# Patient Record
Sex: FEMALE | Race: BLACK OR AFRICAN AMERICAN | NOT HISPANIC OR LATINO | URBAN - METROPOLITAN AREA
[De-identification: names, ages, dates, MRNs, and addresses within clinical notes are randomized per-mention and may not be internally consistent; named-entity substitution may affect disease eponyms.]

---

## 2022-03-29 ENCOUNTER — INPATIENT (INPATIENT)
Facility: HOSPITAL | Age: 82
LOS: 0 days | Discharge: HOME | DRG: 312 | End: 2022-03-30
Attending: HOSPITALIST | Admitting: HOSPITALIST
Payer: MEDICARE

## 2022-03-29 VITALS
OXYGEN SATURATION: 99 % | DIASTOLIC BLOOD PRESSURE: 77 MMHG | SYSTOLIC BLOOD PRESSURE: 159 MMHG | TEMPERATURE: 98 F | RESPIRATION RATE: 16 BRPM | HEART RATE: 73 BPM

## 2022-03-29 DIAGNOSIS — R55 SYNCOPE AND COLLAPSE: ICD-10-CM

## 2022-03-29 DIAGNOSIS — Z90.710 ACQUIRED ABSENCE OF BOTH CERVIX AND UTERUS: Chronic | ICD-10-CM

## 2022-03-29 DIAGNOSIS — D36.9 BENIGN NEOPLASM, UNSPECIFIED SITE: Chronic | ICD-10-CM

## 2022-03-29 LAB
ALBUMIN SERPL ELPH-MCNC: 4.5 G/DL — SIGNIFICANT CHANGE UP (ref 3.3–5)
ALP SERPL-CCNC: 117 U/L — SIGNIFICANT CHANGE UP (ref 40–120)
ALT FLD-CCNC: 16 U/L — SIGNIFICANT CHANGE UP (ref 10–45)
ANION GAP SERPL CALC-SCNC: 13 MMOL/L — SIGNIFICANT CHANGE UP (ref 5–17)
APTT BLD: 27.6 SEC — SIGNIFICANT CHANGE UP (ref 27.5–35.5)
AST SERPL-CCNC: 18 U/L — SIGNIFICANT CHANGE UP (ref 10–40)
BASOPHILS # BLD AUTO: 0.02 K/UL — SIGNIFICANT CHANGE UP (ref 0–0.2)
BASOPHILS NFR BLD AUTO: 0.3 % — SIGNIFICANT CHANGE UP (ref 0–2)
BILIRUB SERPL-MCNC: 0.3 MG/DL — SIGNIFICANT CHANGE UP (ref 0.2–1.2)
BUN SERPL-MCNC: 20 MG/DL — SIGNIFICANT CHANGE UP (ref 7–23)
CALCIUM SERPL-MCNC: 9.9 MG/DL — SIGNIFICANT CHANGE UP (ref 8.4–10.5)
CHLORIDE SERPL-SCNC: 101 MMOL/L — SIGNIFICANT CHANGE UP (ref 96–108)
CO2 SERPL-SCNC: 26 MMOL/L — SIGNIFICANT CHANGE UP (ref 22–31)
CREAT SERPL-MCNC: 0.71 MG/DL — SIGNIFICANT CHANGE UP (ref 0.5–1.3)
EGFR: 85 ML/MIN/1.73M2 — SIGNIFICANT CHANGE UP
EOSINOPHIL # BLD AUTO: 0.11 K/UL — SIGNIFICANT CHANGE UP (ref 0–0.5)
EOSINOPHIL NFR BLD AUTO: 1.6 % — SIGNIFICANT CHANGE UP (ref 0–6)
GLUCOSE SERPL-MCNC: 147 MG/DL — HIGH (ref 70–99)
HCT VFR BLD CALC: 42.4 % — SIGNIFICANT CHANGE UP (ref 34.5–45)
HGB BLD-MCNC: 13.3 G/DL — SIGNIFICANT CHANGE UP (ref 11.5–15.5)
IMM GRANULOCYTES NFR BLD AUTO: 0.3 % — SIGNIFICANT CHANGE UP (ref 0–1.5)
INR BLD: 1.11 RATIO — SIGNIFICANT CHANGE UP (ref 0.88–1.16)
LYMPHOCYTES # BLD AUTO: 1.22 K/UL — SIGNIFICANT CHANGE UP (ref 1–3.3)
LYMPHOCYTES # BLD AUTO: 18.1 % — SIGNIFICANT CHANGE UP (ref 13–44)
MCHC RBC-ENTMCNC: 26.1 PG — LOW (ref 27–34)
MCHC RBC-ENTMCNC: 31.4 GM/DL — LOW (ref 32–36)
MCV RBC AUTO: 83.1 FL — SIGNIFICANT CHANGE UP (ref 80–100)
MONOCYTES # BLD AUTO: 0.5 K/UL — SIGNIFICANT CHANGE UP (ref 0–0.9)
MONOCYTES NFR BLD AUTO: 7.4 % — SIGNIFICANT CHANGE UP (ref 2–14)
NEUTROPHILS # BLD AUTO: 4.86 K/UL — SIGNIFICANT CHANGE UP (ref 1.8–7.4)
NEUTROPHILS NFR BLD AUTO: 72.3 % — SIGNIFICANT CHANGE UP (ref 43–77)
NRBC # BLD: 0 /100 WBCS — SIGNIFICANT CHANGE UP (ref 0–0)
PLATELET # BLD AUTO: 204 K/UL — SIGNIFICANT CHANGE UP (ref 150–400)
POTASSIUM SERPL-MCNC: 4 MMOL/L — SIGNIFICANT CHANGE UP (ref 3.5–5.3)
POTASSIUM SERPL-SCNC: 4 MMOL/L — SIGNIFICANT CHANGE UP (ref 3.5–5.3)
PROT SERPL-MCNC: 8 G/DL — SIGNIFICANT CHANGE UP (ref 6–8.3)
PROTHROM AB SERPL-ACNC: 12.9 SEC — SIGNIFICANT CHANGE UP (ref 10.5–13.4)
RBC # BLD: 5.1 M/UL — SIGNIFICANT CHANGE UP (ref 3.8–5.2)
RBC # FLD: 14.6 % — HIGH (ref 10.3–14.5)
SODIUM SERPL-SCNC: 140 MMOL/L — SIGNIFICANT CHANGE UP (ref 135–145)
TROPONIN T, HIGH SENSITIVITY RESULT: 36 NG/L — SIGNIFICANT CHANGE UP (ref 0–51)
WBC # BLD: 6.73 K/UL — SIGNIFICANT CHANGE UP (ref 3.8–10.5)
WBC # FLD AUTO: 6.73 K/UL — SIGNIFICANT CHANGE UP (ref 3.8–10.5)

## 2022-03-29 PROCEDURE — 93010 ELECTROCARDIOGRAM REPORT: CPT

## 2022-03-29 PROCEDURE — 99285 EMERGENCY DEPT VISIT HI MDM: CPT

## 2022-03-29 PROCEDURE — 70496 CT ANGIOGRAPHY HEAD: CPT | Mod: 26,MA

## 2022-03-29 PROCEDURE — 70498 CT ANGIOGRAPHY NECK: CPT | Mod: 26,MA

## 2022-03-29 NOTE — CONSULT NOTE ADULT - SUBJECTIVE AND OBJECTIVE BOX
HPI:  82 y.o. F with PMH of HTN and HLD presented to the ED after an episode of unresponsiveness. LKN 20:45 hr. Was at [employer] to purchase clothing for 's . Then acutely pt slumped over, unclear whether closed eyes fully, and became unresponsive. Daughter was next to pt and prevented her from falling. No generalized shaking or urinary incontinence noted. No injury to the head. After 15 seconds, pt regained consciousness and after having a candy, felt better. At the time of the exam, pt was A&O x3 and back to baseline without focal deficits and code was cancelled. Takes ASA 81 mg, amlodipine 10 mg, simvastatin 10 mg qhs. Lives at home alone now due to  passing away 2 days ago. No toxic habits. No prior hx of seizure or stroke. Possible that oldest sister had stroke with R sided weakness. Never seen neurologist    (Stroke only)  NIHSS: 0   MRS: 0    REVIEW OF SYSTEMS    A 10-system ROS was performed and is negative except for those items noted above and/or in the HPI.    PAST MEDICAL & SURGICAL HISTORY:  HLD (hyperlipidemia)    HTN (hypertension)    No significant past surgical history      FAMILY HISTORY:    SOCIAL HISTORY:   T/E/D:   Occupation:   Lives with:     MEDICATIONS (HOME):  Home Medications:    MEDICATIONS  (STANDING):    MEDICATIONS  (PRN):    ALLERGIES/INTOLERANCES:  Allergies  No Known Allergies    Intolerances    VITALS & EXAMINATION:  Vital Signs Last 24 Hrs  T(C): 36.8 (29 Mar 2022 21:44), Max: 36.8 (29 Mar 2022 21:44)  T(F): 98.2 (29 Mar 2022 21:44), Max: 98.2 (29 Mar 2022 21:44)  HR: 73 (29 Mar 2022 21:44) (73 - 73)  BP: 159/77 (29 Mar 2022 21:44) (159/77 - 159/77)  BP(mean): --  RR: 16 (29 Mar 2022 21:44) (16 - 16)  SpO2: 99% (29 Mar 2022 21:44) (99% - 99%)    General:  Constitutional: Obese Female, appears stated age, in no apparent distress including pain  Head: Normocephalic & atraumatic.  ENT: Patent ear canals, intact TM, mucus membranes moist & pink, neck supple, no lymphadenopathy.   Respiratory: Patent airway. All lung fields are clear to auscultation bilaterally.  Extremities: No cyanosis, clubbing, or edema.  Skin: No rashes, bruising, or discoloration.    Cardiovascular (>2): RRR no murmurs. Carotid pulsations symmetric, no bruits. Normal capillary beds refill, 1-2 seconds or less.     Neurological (>12):  MS: Awake, alert, oriented to person, place, situation, time. Normal affect. Follows all commands.    Language: Speech is clear, fluent with good repetition & comprehension (able to name objects___)    CNs: PERRLA (R = 3mm, L = 3mm). VFF. EOMI no nystagmus, no diplopia. V1-3 intact to LT/pinprick, well developed masseter muscles b/l. No facial asymmetry b/l, full eye closure strength b/l. Hearing grossly normal (rubbing fingers) b/l. Symmetric palate elevation in midline. Gag reflex deferred. Head turning & shoulder shrug intact b/l. Tongue midline, normal movements, no atrophy.    Fundoscopic: pale w/ sharp discs margins No vascular changes.      Motor: Normal muscle bulk & tone. No noticeable tremor or seizure. No pronator drift.              Deltoid	Biceps	Triceps	Wrist	Finger ABd	   R	5	5	5	5	5		5 	  L	5	5	5	5	5		5    	H-Flex	H-Ext	H-ABd	H-ADd	K-Flex	K-Ext	D-Flex	P-Flex  R	5	5	5	5	5	5	5	5 	   L	5	5	5	5	5	5	5	5	     Sensation: Intact to LT/PP/Temp/Vibration/Position b/l throughout.     Cortical: Extinction on DSS (neglect): none    Reflexes:              Biceps(C5)       BR(C6)     Triceps(C7)               Patellar(L4)    Achilles(S1)    Plantar Resp  R	2	          2	             2		        2		    2		Down   L	2	          2	             2		        2		    2		Down     Coordination: intact rapid-alt movements. No dysmetria to FTN/HTS    Gait: Normal Romberg. No postural instability. Normal stance and tandem gait.     LABORATORY:  CBC                       13.3   6.73  )-----------( 204      ( 29 Mar 2022 22:12 )             42.4     Chem       LFTs   Coagulopathy PT/INR - ( 29 Mar 2022 22:12 )   PT: 12.9 sec;   INR: 1.11 ratio         PTT - ( 29 Mar 2022 22:12 )  PTT:27.6 sec  Lipid Panel   A1c   Cardiac enzymes     U/A   CSF  Immunological  Other    STUDIES & IMAGING:  Studies (EKG, EEG, EMG, etc):     Radiology (XR, CT, MR, U/S, TTE/ADRIANA): HPI:  82 y.o. F with PMH of HTN and HLD presented to the ED after an episode of unresponsiveness. LKN 20:45 hr. Was at [employer] to purchase clothing for 's . Then acutely pt slumped over, unclear whether closed eyes fully, and became unresponsive. Daughter was next to pt and prevented her from falling. No generalized shaking or urinary incontinence noted. No injury to the head. After 15 seconds, pt regained consciousness and after having a candy, felt better. At the time of the exam, pt was A&O x3 and back to baseline without focal deficits and code was cancelled. Takes ASA 81 mg, amlodipine 10 mg, simvastatin 10 mg qhs. Lives at home alone now due to  passing away 2 days ago. No toxic habits. No prior hx of seizure or stroke. Possible that oldest sister had stroke with R sided weakness. Never seen neurologist    (Stroke only)  NIHSS: 0   MRS: 0    REVIEW OF SYSTEMS    A 10-system ROS was performed and is negative except for those items noted above and/or in the HPI.    PAST MEDICAL & SURGICAL HISTORY:  HLD (hyperlipidemia)    HTN (hypertension)    No significant past surgical history      FAMILY HISTORY:    SOCIAL HISTORY:   T/E/D:   Occupation:   Lives with:     MEDICATIONS (HOME):  Home Medications:    MEDICATIONS  (STANDING):    MEDICATIONS  (PRN):    ALLERGIES/INTOLERANCES:  Allergies  No Known Allergies    Intolerances    VITALS & EXAMINATION:  Vital Signs Last 24 Hrs  T(C): 36.8 (29 Mar 2022 21:44), Max: 36.8 (29 Mar 2022 21:44)  T(F): 98.2 (29 Mar 2022 21:44), Max: 98.2 (29 Mar 2022 21:44)  HR: 73 (29 Mar 2022 21:44) (73 - 73)  BP: 159/77 (29 Mar 2022 21:44) (159/77 - 159/77)  BP(mean): --  RR: 16 (29 Mar 2022 21:44) (16 - 16)  SpO2: 99% (29 Mar 2022 21:44) (99% - 99%)    General:  Constitutional: Obese Female, appears stated age, in no apparent distress including pain  Head: Normocephalic & atraumatic.  ENT: Patent ear canals, intact TM, mucus membranes moist & pink, neck supple, no lymphadenopathy.   Respiratory: Patent airway. All lung fields are clear to auscultation bilaterally.  Extremities: No cyanosis, clubbing, or edema.  Skin: No rashes, bruising, or discoloration.    Cardiovascular (>2): RRR no murmurs. Carotid pulsations symmetric, no bruits. Normal capillary beds refill, 1-2 seconds or less.     Neurological (>12):  MS: Awake, alert, oriented to person, place, situation, time. Normal affect. Follows all commands.    Language: Speech is clear, fluent with good repetition & comprehension (able to name objects___)    CNs: PERRLA (R = 3mm, L = 3mm). VFF. EOMI no nystagmus, no diplopia. V1-3 intact to LT/pinprick, well developed masseter muscles b/l. No facial asymmetry b/l, full eye closure strength b/l. Hearing grossly normal (rubbing fingers) b/l. Symmetric palate elevation in midline. Gag reflex deferred. Head turning & shoulder shrug intact b/l. Tongue midline, normal movements, no atrophy.    Fundoscopic: pale w/ sharp discs margins No vascular changes.      Motor: Normal muscle bulk & tone. No noticeable tremor or seizure. No pronator drift.              Deltoid	Biceps	Triceps	Wrist	Finger ABd	   R	5	5	5	5	5		5 	  L	5	5	5	5	5		5    	H-Flex	H-Ext	H-ABd	H-ADd	K-Flex	K-Ext	D-Flex	P-Flex  R	5	5	5	5	5	5	5	5 	   L	5	5	5	5	5	5	5	5	     Sensation: Intact to LT/PP/Temp/Vibration/Position b/l throughout.     Cortical: Extinction on DSS (neglect): none    Reflexes:              Biceps(C5)       BR(C6)     Triceps(C7)               Patellar(L4)    Achilles(S1)    Plantar Resp  R	2	          2	             2		        2		    2		Down   L	2	          2	             2		        2		    2		Down     Coordination: intact rapid-alt movements. No dysmetria to FTN/HTS    Gait: Normal Romberg. No postural instability. Normal stance and tandem gait.     LABORATORY:  CBC                       13.3   6.73  )-----------( 204      ( 29 Mar 2022 22:12 )             42.4     Chem       LFTs   Coagulopathy PT/INR - ( 29 Mar 2022 22:12 )   PT: 12.9 sec;   INR: 1.11 ratio         PTT - ( 29 Mar 2022 22:12 )  PTT:27.6 sec  Lipid Panel   A1c   Cardiac enzymes     U/A   CSF  Immunological  Other    STUDIES & IMAGING:  Studies (EKG, EEG, EMG, etc):     Radiology (XR, CT, MR, U/S, TTE/ADRIANA):  c< from: CT Angio Neck w/ IV Cont (22 @ 22:19) >  IMPRESSION:    CT HEAD: No acute intracranial hemorrhage..    CTA BRAIN:  1. Patent intracranial circulation without flow limiting stenosis or   occlusion.    CTA NECK:  1. Prominent calcification at the origin of the right vertebral artery   obscures evaluation for patency. The remainder of the vertebral arteries   are patent.    --- End of Report ---    < end of copied text >

## 2022-03-29 NOTE — CONSULT NOTE ADULT - ATTENDING COMMENTS
82 y.o. F with PMH of HTN and HLD presented to the ED after an episode of unresponsiveness. LKN 20:45 hr. NIHSS 0 and mRS 0. Code stroke cancelled. Neuro exam non-focal. CTH neg. CTA showing prominent calcification at the origin of R vert, with remainder of vert patent. Labs grossly unremarkable.    Impression: Resolved 15 sec unresponsiveness, likely syncope    cardiac w/u  -No further inpatient Neurologic workup at this time  Can follow up with Neurology, Dr. Harman West at [phone]

## 2022-03-29 NOTE — ED PROVIDER NOTE - CROS ED CONS ALL NEG
My findings and recommendations are based on patient's symptoms, eye exam, diagnostic testing, and records. negative...

## 2022-03-29 NOTE — ED ADULT TRIAGE NOTE - CHIEF COMPLAINT QUOTE
patient was standing in line at a store after shopping for  husbands  clothes and started feeling as if she was going to pass out.  daughter states patient did not respond to verbalizations for about 15 seconds.  patient given a piece  of candy and symptoms resolved

## 2022-03-29 NOTE — CONSULT NOTE ADULT - ASSESSMENT
82 y.o. F with PMH of HTN and HLD presented to the ED after an episode of unresponsiveness. LKN 20:45 hr. NIHSS 0 and mRS 0. Code stroke cancelled. Neuro exam non-focal. CTH grossly negative to my eye and CTA with calcification at carotid bifurcations b/l, b/l PCA stenosis but with good distal circulation, awaiting final read.    Impression: Resolved 15 sec unresponsiveness of unclear etiology. Localization likely diffuse brain dysfunction. Ddx include syncope vs presyncope vs other cardiac vs lower suspicion for acute ischemic stroke.  Unlikely seizure.    Recommendations:  [] Basic CBC, CMP, UA, cardiac w/u including TTE  [] Upon discharge, PT can F/U with Dr. West if periods of unresponsiveness recurs and cardiac w/u neg. [phone] [address] [place] [address]     Case to be discussed with general neurology attending in AM 82 y.o. F with PMH of HTN and HLD presented to the ED after an episode of unresponsiveness. LKN 20:45 hr. NIHSS 0 and mRS 0. Code stroke cancelled. Neuro exam non-focal. CTH grossly negative to my eye and CTA with calcification at carotid bifurcations b/l, b/l PCA stenosis but with good distal circulation, awaiting final read.    Impression: Resolved 15 sec unresponsiveness of unclear etiology. Localization likely diffuse brain dysfunction. Ddx include syncope vs presyncope vs other cardiac vs lower suspicion for acute ischemic stroke.  Unlikely seizure.    Recommendations:  [] Basic CBC, CMP, UA, cardiac w/u including TTE  [] C/w home ASA 81 mg  [] Neurocheck and vital per unit protocol  [] Upon discharge, PT can F/U with Dr. West if periods of unresponsiveness recurs and cardiac w/u neg for outpt MRI brain w/o and routine EEG. [phone] [address] [place] [address]     Case to be discussed with general neurology attending in AM 82 y.o. F with PMH of HTN and HLD presented to the ED after an episode of unresponsiveness. LKN 20:45 hr. NIHSS 0 and mRS 0. Code stroke cancelled. Neuro exam non-focal. CTH neg. CTA showing prominent calcification at the origin of R vert, with remainder of vert patent. Labs grossly unremarkable.    Impression: Resolved 15 sec unresponsiveness of unclear etiology. Localization likely diffuse brain dysfunction. Ddx include syncope vs presyncope vs other cardiac vs lower suspicion for acute ischemic stroke.  Unlikely seizure.    Recommendations:  [] Basic CBC, CMP, UA, cardiac w/u including TTE  [] C/w home ASA 81 mg  [] Neurocheck and vital per unit protocol  [] Upon discharge, PT can F/U with Dr. West if periods of unresponsiveness recurs and cardiac w/u neg for outpt MRI brain w/o and routine EEG. [phone] [address] [place] [address]     Case to be discussed with general neurology attending in AM

## 2022-03-29 NOTE — ED PROVIDER NOTE - ATTENDING CONTRIBUTION TO CARE
attending Gris: 82yF h/o HLD, ?HTN on ASA 81mg daily presents after syncopal episode in today while shopping at [employer] 1.5 hours PTA (845pm) witnessed by pt's daughter. No trauma. Exam as above. Will obtain ekg, place on tele, labs including trop, CT head imaging (initially called as code stroke), anticipate admission

## 2022-03-29 NOTE — ED PROVIDER NOTE - OBJECTIVE STATEMENT
81 y/o F PMH HLD on baby aspirin daily, ? HTN presents to ED after near-syncope vs syncopal episode in today in [employer] 1.5 hours PTA (845pm) witnessed by pt's daughter. Pt reports she felt lightheaded and dizzy and her vision went black but remembers the episode. Daughter reports she slumped forward at checkout and was unresponsive to voice for 15-20s, then blinked slowly and came to saying "I'm fine." Denies fall or head injury, daughter reports she supported her through episode. pt was given hard candy PTA, she reports feeling better after. Denies HA, numbness, weakness, cp, sob, recent illness. Denies hx stroke in the past. No blood thinners besides aspirin. 83 y/o F PMH HLD on baby aspirin daily, ? HTN presents to ED after near-syncope vs syncopal episode in today in [employer] 1.5 hours PTA (845pm) witnessed by pt's daughter. Pt reports she felt lightheaded and dizzy and her vision went black but remembers the episode. Daughter reports she slumped forward at checkout and was unresponsive to voice for 15-20s, then blinked slowly and came to saying "I'm fine." Denies fall or head injury, daughter reports she supported her through episode. pt was given hard candy PTA, she reports feeling better after. Denies HA, numbness, weakness, cp, sob, recent illness. Denies hx stroke in the past. No blood thinners besides aspirin.    PCP: none  Cardiology: none

## 2022-03-29 NOTE — ED ADULT NURSE NOTE - OBJECTIVE STATEMENT
Patient is an 83 y/o female with PMH of HTN and HLD with c/o presyncopal episode. Patient was at [facility] with her daughter shopping for clothes for her 's  who recently passed away, while paying at Patient is an 81 y/o female with PMH of HTN and HLD with c/o presyncopal episode. Patient was at [employer] with her daughter shopping for clothes for her 's  who recently passed away, while paying at the register the patient "went blank." As per patient's daughter patient was staring into space and not responding for about 15 seconds and patient's head started to lean forward. Patient ate a piece of candy and felt better shortly after. Patient recalls entire episode. Patient states she was feeling dizzy and lightheaded. Patient A&Ox4, PERRLA, no facial droop noted, denies dizziness at this time, breathing unlabored, denies SOB, denies chest pain, peripheral pulses intact, cap refill less than 2 secs, abdomen nondistended, denies n/v/d, moving extremities w/o difficulty, hand grasp equally strong bilaterally, no arm drift noted, lower extremities strong bilaterally, skin warm and intact, denies fever, cough, or chills.

## 2022-03-29 NOTE — ED PROVIDER NOTE - PHYSICAL EXAMINATION
Gen: well developed female    HEENT: NCAT, EOMI, no nasal discharge, mucous membranes moist  CV: RRR, +S1/S2, no M/R/G  Resp: CTAB, no W/R/R  GI: Abdomen soft non-distended, NTTP, no masses  MSK: No open wounds, no bruising, no LE edema  Neuro: CN2-12 grossly intact, A&Ox4, MS +5/5 in UE and LE BL, finger to nose smooth and rapid, gross sensation intact in UE and LE BL, negative rhomberg, negative pronator drift  Psych: appropriate mood

## 2022-03-29 NOTE — ED PROVIDER NOTE - NSICDXPASTSURGICALHX_GEN_ALL_CORE_FT
PAST SURGICAL HISTORY:  No significant past surgical history      PAST SURGICAL HISTORY:  Benign teratoma     S/P hysterectomy

## 2022-03-29 NOTE — ED PROVIDER NOTE - CLINICAL SUMMARY MEDICAL DECISION MAKING FREE TEXT BOX
81 y/o F PMH HLD on baby aspirin daily, ? HTN presents to ED after near-syncope vs syncopal episode in today in [employer] 1.5 hours PTA (845pm) witnessed by pt's daughter. concern for vasovagal syncope, valvular disease, arrythmia, atypical ACS, CVA. pt was code stroke. plan labs trop ekg CTs likely obs vs admit

## 2022-03-29 NOTE — ED ADULT NURSE NOTE - HOW OFTEN DO YOU HAVE A DRINK CONTAINING ALCOHOL?
[facility] [place] (site): [facility]    6/24/2019 10:14 AM    Max heart rate: N/A    74 year old    Wt Readings from Last 1 Encounters:   06/24/19 113.8 kg      Ht Readings from Last 1 Encounters:   06/22/19 4' 11\" (1.499 m)       Patient Type: Inpatient    Cardiac Markers: See Results    Reason for test: Syncope-Elevated troponin    Primary MD: TRUPTI Alexander      Ordering MD: EVELIA Currie        Performing Test: STUART Chiang NP    --------------------------------------------------------------------------------------------------------------------  HISTORY OF: HTN,Pacemaker,Pulmonary HTN,Afib,RICK,HPL,Right sided Heart failure          Current Facility-Administered Medications   Medication Dose Route Frequency Provider Last Rate Last Dose   • warfarin (COUMADIN) tablet 5 mg  5 mg Oral Once Tony Currie MD       • metOLazone (ZAROXOLYN) tablet 5 mg  5 mg Oral Daily Tony Currie MD   5 mg at 06/23/19 1134   • atorvastatin (LIPITOR) tablet 20 mg  20 mg Oral Nightly SELMA Dickey   20 mg at 06/23/19 2119   • bumetanide (BUMEX) tablet 4 mg  4 mg Oral Daily SELMA Dickey   4 mg at 06/23/19 1134   • ferrous sulfate (65 mg Fe per 325 mg) tablet 325 mg  325 mg Oral Daily with breakfast SELMA Dickey   325 mg at 06/24/19 0806   • levothyroxine (SYNTHROID, LEVOTHROID) tablet 25 mcg  25 mcg Oral Daily SELMA Dickey   25 mcg at 06/24/19 0806   • nystatin (MYCOSTATIN) powder   Topical 2 times per day Tony Currie MD       • sodium chloride (PF) 0.9 % injection 2 mL  2 mL Injection 2 times per day SELMA Dickey   2 mL at 06/23/19 2120   • sodium chloride (PF) 0.9 % injection 2 mL  2 mL Injection PRN SELMA Dickey       • potassium CHLORIDE (KLOR-CON M) sue ER tablet 20 mEq  20 mEq Oral Q4H PRN SELMA Dickey       • potassium CHLORIDE (KLOR-CON) packet 20 mEq  20 mEq Per NG tube Q4H PRN SELMA Dickey   20 mEq at 06/23/19 1316   • potassium CHLORIDE 20 mEq/100mL IVPB premix  20 mEq Intravenous Q4H  PRN SELMA Dickey       • potassium CHLORIDE (KLOR-CON M) sue ER tablet 40 mEq  40 mEq Oral Q4H PRN SELMA Dickey   40 mEq at 06/24/19 0335   • potassium CHLORIDE (KLOR-CON) packet 40 mEq  40 mEq Per NG tube Q4H PRSELMA Durand   40 mEq at 06/23/19 1809   • potassium CHLORIDE 20 mEq/100mL IVPB premix  40 mEq Intravenous Q4H PRN SELMA Dickey       • magnesium sulfate 1 g in dextrose 5% 100 mL IVPB premix  1 g Intravenous Daily PRN SELMA Dickey       • magnesium sulfate 2 g in 50 mL premix IVPB  2 g Intravenous Daily PRSELMA Durand       • magnesium sulfate 2 g in 50 mL premix IVPB  2 g Intravenous Q4H PRSELMA Durand       • ondansetron (ZOFRAN) injection 4 mg  4 mg Intravenous BID PRSELMA Durand       • prochlorperazine (COMPAZINE) tablet 5 mg  5 mg Oral Q4H PRN SELMA Dickey       • acetaminophen (TYLENOL) tablet 650 mg  650 mg Oral Q4H PRN SELMA Dickey       • polyethylene glycol (GLYCOLAX, MIRALAX) packet 17 g  17 g Oral Daily PRSELMA Durand       • docusate sodium-sennosides (SENOKOT S) 50-8.6 MG 2 tablet  2 tablet Oral Daily PRSELMA Durand       • bisacodyl (DULCOLAX) suppository 10 mg  10 mg Rectal Daily PRN SELMA Dickey       • magnesium hydroxide (MILK OF MAGNESIA) concentrate 2,400 mg  10 mL Oral Daily PRN SELMA Dickey       • sodium chloride 0.9 % flush bag 500 mL  500 mL Intravenous PRN SELMA Dickey       • WARFARIN - PHARMACIST MONITORED   Does not apply See Admin Instructions SELMA Dickey           ALLERGIES:   Allergen Reactions   • Keflex GI UPSET   • Adhesive   (Environmental) RASH     paper tape ok per patient   • Sulfa Antibiotics RASH     Initial rash was many years ago.  Re-challenge in 2013 and she broke out in a rash on day 8.       MEETS CRITERIA FOR STRESS TEST: Yes         NOTES:  Procedure explained; patient states understanding and wishes to continue with  test    TYPE OF TEST: Myocardial Perfusion and Lexiscan    ABLE TO WALK: Yes     NEEDED: No   Never

## 2022-03-30 ENCOUNTER — TRANSCRIPTION ENCOUNTER (OUTPATIENT)
Age: 82
End: 2022-03-30

## 2022-03-30 VITALS
OXYGEN SATURATION: 99 % | DIASTOLIC BLOOD PRESSURE: 62 MMHG | HEART RATE: 72 BPM | TEMPERATURE: 98 F | RESPIRATION RATE: 18 BRPM | SYSTOLIC BLOOD PRESSURE: 108 MMHG

## 2022-03-30 DIAGNOSIS — E78.5 HYPERLIPIDEMIA, UNSPECIFIED: ICD-10-CM

## 2022-03-30 DIAGNOSIS — I10 ESSENTIAL (PRIMARY) HYPERTENSION: ICD-10-CM

## 2022-03-30 DIAGNOSIS — R55 SYNCOPE AND COLLAPSE: ICD-10-CM

## 2022-03-30 LAB
APPEARANCE UR: CLEAR — SIGNIFICANT CHANGE UP
BILIRUB UR-MCNC: NEGATIVE — SIGNIFICANT CHANGE UP
COLOR SPEC: SIGNIFICANT CHANGE UP
DIFF PNL FLD: NEGATIVE — SIGNIFICANT CHANGE UP
GLUCOSE BLDC GLUCOMTR-MCNC: 110 MG/DL — HIGH (ref 70–99)
GLUCOSE BLDC GLUCOMTR-MCNC: 82 MG/DL — SIGNIFICANT CHANGE UP (ref 70–99)
GLUCOSE BLDC GLUCOMTR-MCNC: 92 MG/DL — SIGNIFICANT CHANGE UP (ref 70–99)
GLUCOSE UR QL: NEGATIVE — SIGNIFICANT CHANGE UP
KETONES UR-MCNC: NEGATIVE — SIGNIFICANT CHANGE UP
LEUKOCYTE ESTERASE UR-ACNC: NEGATIVE — SIGNIFICANT CHANGE UP
NITRITE UR-MCNC: NEGATIVE — SIGNIFICANT CHANGE UP
PH UR: 6 — SIGNIFICANT CHANGE UP (ref 5–8)
PHOSPHATE SERPL-MCNC: 3.8 MG/DL — SIGNIFICANT CHANGE UP (ref 2.5–4.5)
PROT UR-MCNC: NEGATIVE — SIGNIFICANT CHANGE UP
SARS-COV-2 RNA SPEC QL NAA+PROBE: SIGNIFICANT CHANGE UP
SP GR SPEC: 1.03 — HIGH (ref 1.01–1.02)
TROPONIN T, HIGH SENSITIVITY RESULT: 31 NG/L — SIGNIFICANT CHANGE UP (ref 0–51)
UROBILINOGEN FLD QL: NEGATIVE — SIGNIFICANT CHANGE UP

## 2022-03-30 PROCEDURE — 97161 PT EVAL LOW COMPLEX 20 MIN: CPT

## 2022-03-30 PROCEDURE — 82962 GLUCOSE BLOOD TEST: CPT

## 2022-03-30 PROCEDURE — 70498 CT ANGIOGRAPHY NECK: CPT | Mod: MA

## 2022-03-30 PROCEDURE — 84100 ASSAY OF PHOSPHORUS: CPT

## 2022-03-30 PROCEDURE — 36415 COLL VENOUS BLD VENIPUNCTURE: CPT

## 2022-03-30 PROCEDURE — 84484 ASSAY OF TROPONIN QUANT: CPT

## 2022-03-30 PROCEDURE — 85730 THROMBOPLASTIN TIME PARTIAL: CPT

## 2022-03-30 PROCEDURE — 83735 ASSAY OF MAGNESIUM: CPT

## 2022-03-30 PROCEDURE — 99285 EMERGENCY DEPT VISIT HI MDM: CPT | Mod: 25

## 2022-03-30 PROCEDURE — 85025 COMPLETE CBC W/AUTO DIFF WBC: CPT

## 2022-03-30 PROCEDURE — 99223 1ST HOSP IP/OBS HIGH 75: CPT

## 2022-03-30 PROCEDURE — 70496 CT ANGIOGRAPHY HEAD: CPT | Mod: MA

## 2022-03-30 PROCEDURE — 93306 TTE W/DOPPLER COMPLETE: CPT | Mod: 26

## 2022-03-30 PROCEDURE — 85610 PROTHROMBIN TIME: CPT

## 2022-03-30 PROCEDURE — U0003: CPT

## 2022-03-30 PROCEDURE — 80053 COMPREHEN METABOLIC PANEL: CPT

## 2022-03-30 PROCEDURE — 81003 URINALYSIS AUTO W/O SCOPE: CPT

## 2022-03-30 PROCEDURE — 93306 TTE W/DOPPLER COMPLETE: CPT

## 2022-03-30 PROCEDURE — 70450 CT HEAD/BRAIN W/O DYE: CPT | Mod: MA

## 2022-03-30 PROCEDURE — U0005: CPT

## 2022-03-30 PROCEDURE — 99239 HOSP IP/OBS DSCHRG MGMT >30: CPT

## 2022-03-30 RX ORDER — SIMVASTATIN 20 MG/1
5 TABLET, FILM COATED ORAL AT BEDTIME
Refills: 0 | Status: DISCONTINUED | OUTPATIENT
Start: 2022-03-30 | End: 2022-03-30

## 2022-03-30 RX ORDER — DEXTROSE 50 % IN WATER 50 %
25 SYRINGE (ML) INTRAVENOUS ONCE
Refills: 0 | Status: DISCONTINUED | OUTPATIENT
Start: 2022-03-30 | End: 2022-03-30

## 2022-03-30 RX ORDER — ATORVASTATIN CALCIUM 80 MG/1
1 TABLET, FILM COATED ORAL
Qty: 30 | Refills: 0
Start: 2022-03-30

## 2022-03-30 RX ORDER — ASPIRIN/CALCIUM CARB/MAGNESIUM 324 MG
81 TABLET ORAL DAILY
Refills: 0 | Status: DISCONTINUED | OUTPATIENT
Start: 2022-03-30 | End: 2022-03-30

## 2022-03-30 RX ORDER — ATORVASTATIN CALCIUM 80 MG/1
20 TABLET, FILM COATED ORAL AT BEDTIME
Refills: 0 | Status: DISCONTINUED | OUTPATIENT
Start: 2022-03-30 | End: 2022-03-30

## 2022-03-30 RX ORDER — SIMVASTATIN 20 MG/1
1 TABLET, FILM COATED ORAL
Qty: 0 | Refills: 0 | DISCHARGE

## 2022-03-30 RX ORDER — LANOLIN ALCOHOL/MO/W.PET/CERES
3 CREAM (GRAM) TOPICAL AT BEDTIME
Refills: 0 | Status: DISCONTINUED | OUTPATIENT
Start: 2022-03-30 | End: 2022-03-30

## 2022-03-30 RX ORDER — ACETAMINOPHEN 500 MG
650 TABLET ORAL EVERY 6 HOURS
Refills: 0 | Status: DISCONTINUED | OUTPATIENT
Start: 2022-03-30 | End: 2022-03-30

## 2022-03-30 RX ORDER — INSULIN LISPRO 100/ML
VIAL (ML) SUBCUTANEOUS
Refills: 0 | Status: DISCONTINUED | OUTPATIENT
Start: 2022-03-30 | End: 2022-03-30

## 2022-03-30 RX ORDER — GLUCAGON INJECTION, SOLUTION 0.5 MG/.1ML
1 INJECTION, SOLUTION SUBCUTANEOUS ONCE
Refills: 0 | Status: DISCONTINUED | OUTPATIENT
Start: 2022-03-30 | End: 2022-03-30

## 2022-03-30 RX ORDER — DEXTROSE 50 % IN WATER 50 %
15 SYRINGE (ML) INTRAVENOUS ONCE
Refills: 0 | Status: DISCONTINUED | OUTPATIENT
Start: 2022-03-30 | End: 2022-03-30

## 2022-03-30 RX ORDER — ATORVASTATIN CALCIUM 80 MG/1
1 TABLET, FILM COATED ORAL
Qty: 30 | Refills: 0
Start: 2022-03-30 | End: 2022-04-28

## 2022-03-30 RX ORDER — SODIUM CHLORIDE 9 MG/ML
1000 INJECTION, SOLUTION INTRAVENOUS
Refills: 0 | Status: DISCONTINUED | OUTPATIENT
Start: 2022-03-30 | End: 2022-03-30

## 2022-03-30 RX ORDER — ONDANSETRON 8 MG/1
4 TABLET, FILM COATED ORAL EVERY 8 HOURS
Refills: 0 | Status: DISCONTINUED | OUTPATIENT
Start: 2022-03-30 | End: 2022-03-30

## 2022-03-30 RX ORDER — AMLODIPINE BESYLATE 2.5 MG/1
10 TABLET ORAL DAILY
Refills: 0 | Status: DISCONTINUED | OUTPATIENT
Start: 2022-03-30 | End: 2022-03-30

## 2022-03-30 RX ORDER — ENOXAPARIN SODIUM 100 MG/ML
40 INJECTION SUBCUTANEOUS EVERY 24 HOURS
Refills: 0 | Status: DISCONTINUED | OUTPATIENT
Start: 2022-03-30 | End: 2022-03-30

## 2022-03-30 RX ADMIN — ENOXAPARIN SODIUM 40 MILLIGRAM(S): 100 INJECTION SUBCUTANEOUS at 12:25

## 2022-03-30 RX ADMIN — AMLODIPINE BESYLATE 10 MILLIGRAM(S): 2.5 TABLET ORAL at 12:25

## 2022-03-30 RX ADMIN — Medication 81 MILLIGRAM(S): at 12:25

## 2022-03-30 NOTE — DISCHARGE NOTE NURSING/CASE MANAGEMENT/SOCIAL WORK - NSDCPEFALRISK_GEN_ALL_CORE
For information on Fall & Injury Prevention, visit: [email or web address] OR  [email or web address] OR  https://www.cdc.gov/steadi/patient.html

## 2022-03-30 NOTE — H&P ADULT - NSHPREVIEWOFSYSTEMS_GEN_ALL_CORE
` ` Patient Information     Patient Name Sex     Emilia Almaraz ([id number]) Male 1939       Room Bed    803      Patient Demographics     Address Phone E-mail Address    [address]   [facility] [address] [phone] (Home)  none (Work)  none (Mobile) [email or web address]      Patient Ethnicity & Race     Ethnic Group Patient Race    American White      Emergency Contact(s)     Name Relation Home Work Mobile    EMILIA ALMARAZ Son [phone]      Mackenzie Almaraz Daughter [phone]      Documents on File        Status Date Received Description       Documents for the Patient    Affiliate Privacy placeholder   phase3    Consent for EHR Access Received 16     Insurance Card Received 16 medicare and [facility]    External Medication Information Consent       Patient ID Received 16 mn id    [facility] Specified Other       Consent for Services/Privacy Notice - Hospital/Clinic Received () 16     Privacy Notice - [place] Received 16     Consent for EHR Access Received 17     Consent for Services/Privacy Notice - Hospital/Clinic Received 17     Care Everywhere Prospective Auth Received 17     CE Persistent Point of Care Auth Received 18 CE Persistent Point of Care Authorization       Documents for the Encounter    CMS IM for Patient Signature Received 18     Flowsheet  18 OBSERVATION FLOWSHEET-EMERGENCY DEPARTMENT    CE Point of Care Auth Received        Admission Information     Attending Provider Admitting Provider Admission Type Admission Date/Time    Grady Mccall MD Dahal, Pradeep, MD Emergency 18    Discharge Date Hospital Service Auth/Cert Status Service Area     Internal Medicine [facility]    Unit Room/Bed Admission Status       UR 8A  Admission (Confirmed)       Admission     Complaint    Hallucinations      Hospital Account     Name Acct ID Class Status  Primary Coverage    Deion Diaz [id number] Inpatient Open MEDICARE - MEDICARE            Guarantor Account (for Hospital Account #[id number])     Name Relation to Pt Service Area Active? Acct Type    Deion Diaz Self FCS Yes Personal/Family    Address Phone          [address]   [place], [place] [address] [phone](H)  none(O)              Coverage Information (for Hospital Account #[id number])     1. MEDICARE/MEDICARE     F/O Payor/Plan Precert #    MEDICARE/MEDICARE     Subscriber Subscriber #    Deion Diaz [id number]    Address Phone    [employer] CLAIMS  [address]  [place] [address] [phone]          2. COMMERCIAL/[employer] BENEFIT PLAN     F/O Payor/Plan Precert #    COMMERCIAL/[employer] BENEFIT PLAN     Subscriber Subscriber #    Deion Diaz [id number]    Address Phone    [address]  [place] [address] [phone]             GEN: no night sweats or change in appetite  EYES: +vision changes, no eye pain  ENT: no epistaxis, sinus pain, gingival bleeding, odynophagia or dysphagia  CV: no CP, PND or palpitations  RESP: no cough, wheezing, or hemoptysis  GI: no hematemesis, hematochezia, or melena  : no dysuria, polyuria, or hematuria  MSK: no arthralgias or joint swelling   NEURO: no gross sensory changes, numbness, focal deficits  PSYCH: no depression or changes in concentration  HEME/ONC: no purpura, petechiae or night sweats  SKIN: no pruritus, hair loss or skin lesions  ALL: no photosensitivity, no complaints of anaphylaxis (SOB, throat swelling)

## 2022-03-30 NOTE — H&P ADULT - HISTORY OF PRESENT ILLNESS
82F PMH HTN, HLD p/w syncopal episode. Pt had been shopping at [employer] for 's  yesterday evening around 8pm when she states having vision changes and cannot remember what happened right after. Per daughter who witnessed event, pt had LOC and she was able to catch her fall. States pt regained consciousness after about 15secs and was given candy which made her feel better. Denied generalized shaking, urinary incontinence, tongue biting or HT. Pt denies prior episode  or being at the time emotionally overwhelmed. She does reports not having anything to eat since the AM. Denied CP, SOB, palpitations, abdominal pain, melena/hematochezia, h/o seizure d/o or CVA    ED course: Afebrile. HDS. Stroke code activated.

## 2022-03-30 NOTE — H&P ADULT - NSHPLABSRESULTS_GEN_ALL_CORE
13.3   6.73  )-----------( 204      ( 29 Mar 2022 22:12 )             42.4       03-29    140  |  101  |  20  ----------------------------<  147<H>  4.0   |  26  |  0.71    Ca    9.9      29 Mar 2022 22:12  Phos  3.8     03-30  Mg     2.0     03-29    TPro  8.0  /  Alb  4.5  /  TBili  0.3  /  DBili  x   /  AST  18  /  ALT  16  /  AlkPhos  117  03-29            LIVER FUNCTIONS - ( 29 Mar 2022 22:12 )  Alb: 4.5 g/dL / Pro: 8.0 g/dL / ALK PHOS: 117 U/L / ALT: 16 U/L / AST: 18 U/L / GGT: x             PT/INR - ( 29 Mar 2022 22:12 )   PT: 12.9 sec;   INR: 1.11 ratio         PTT - ( 29 Mar 2022 22:12 )  PTT:27.6 sec        I have personally reviewed EKG, w/o e/o acute ischemia  I have personally reviewed imaging, wnl

## 2022-03-30 NOTE — DISCHARGE NOTE PROVIDER - NSDCMRMEDTOKEN_GEN_ALL_CORE_FT
aspirin 81 mg oral delayed release tablet: 1 tab(s) orally once a day  atorvastatin 20 mg oral tablet: 1 tab(s) orally once a day (at bedtime)  Norvasc 10 mg oral tablet: 1 tab(s) orally once a day

## 2022-03-30 NOTE — PATIENT PROFILE ADULT - FALL HARM RISK - HARM RISK INTERVENTIONS

## 2022-03-30 NOTE — DISCHARGE NOTE PROVIDER - PROVIDER TOKENS
PROVIDER:[TOKEN:[[id number]],FOLLOWUP:[1 week]],PROVIDER:[TOKEN:[[id number]],FOLLOWUP:[1-3 days]]

## 2022-03-30 NOTE — DISCHARGE NOTE NURSING/CASE MANAGEMENT/SOCIAL WORK - PATIENT PORTAL LINK FT
You can access the FollowMyHealth Patient Portal offered by [facility] by registering at the following website: [email or web address]. By joining [facility]’s FollowMyHealth portal, you will also be able to view your health information using other applications (apps) compatible with our system.

## 2022-03-30 NOTE — PHYSICAL THERAPY INITIAL EVALUATION ADULT - PERTINENT HX OF CURRENT PROBLEM, REHAB EVAL
82y.o F PMH HTN, HLD p/w syncopal episode. Pt had been shopping at [employer] for 's  yesterday evening around 8pm when she states having vision changes and cannot remember what happened right after. Per daughter who witnessed event, pt had LOC and she was able to catch her fall.

## 2022-03-30 NOTE — DISCHARGE NOTE PROVIDER - NSDCFUADDAPPT_GEN_ALL_CORE_FT
APPTS ARE READY TO BE MADE: [x ] YES    Best Family or Patient Contact (if needed):    Additional Information about above appointments (if needed):    1:   2:   3:     Other comments or requests:    APPTS ARE READY TO BE MADE: [x ] YES    Best Family or Patient Contact (if needed):    Additional Information about above appointments (if needed):    1:   2:   3:     Other comments or requests:   										              3 attempts were made to reach patient, which have been unsuccessful. Unable to leave voicemail on 04/06, 04/07, 04/08. Will await call back from patient to coordinate follow up care.	  																	 APPTS ARE READY TO BE MADE: [x ] YES    Best Family or Patient Contact (if needed):    Additional Information about above appointments (if needed):    1:   2:   3:     Other comments or requests:   										              3 attempts were made to reach patient, which have been unsuccessful. Unable to leave voicemail on 04/06, 04/07, 04/08. Will await call back from patient to coordinate follow up care.	  										Patient was scheduled with Harman Hayden on  04/18 03:30 PM at [address], [address] through the provider's office. Patient advised of appointment details.

## 2022-03-30 NOTE — DISCHARGE NOTE PROVIDER - CARE PROVIDER_API CALL
Harman West)  Neurology; Neurophysiology  [address]  [place] [address]  Phone: [phone]  Fax: [phone]  Follow Up Time: 1 week    Nathan Jones (DO)  Cardiovascular Disease; Internal Medicine; Nuclear Cardiology  [address], [address]  [place] [address]  Phone: [phone]  Fax: [phone]  Follow Up Time: 1-3 days

## 2022-03-30 NOTE — DISCHARGE NOTE PROVIDER - NSDCCPCAREPLAN_GEN_ALL_CORE_FT
PRINCIPAL DISCHARGE DIAGNOSIS  Diagnosis: Near-syncope or syncope  Assessment and Plan of Treatment:        PRINCIPAL DISCHARGE DIAGNOSIS  Diagnosis: Near-syncope or syncope  Assessment and Plan of Treatment: Follow up closely with your cardiologist  HOME CARE INSTRUCTIONS  Have someone stay with you until you feel stable.  Do not drive, operate machinery, or play sports until your caregiver says it is okay.  Keep all follow-up appointments as directed by your caregiver.   Lie down right away if you start feeling like you might faint. Breathe deeply and steadily. Wait until all the symptoms have passed.Drink enough fluids to keep your urine clear or pale yellow.  If you are taking blood pressure or heart medicine, get up slowly, taking several minutes to sit and then stand. This can reduce dizziness.  SEEK IMMEDIATE MEDICAL CARE IF:  You have a severe headache.  You have unusual pain in the chest, abdomen, or back.  You are bleeding from the mouth or rectum, or you have black or tarry stool.  You have an irregular or very fast heartbeat.  You have pain with breathing.  You have repeated fainting or seizure-like jerking during an episode.  You faint when sitting or lying down.  You have confusion.  You have difficulty walking.  You have severe weakness.  You have vision problems.  If you fainted, call your local emergency services (_____________________). Do not drive yourself to the hospital        SECONDARY DISCHARGE DIAGNOSES  Diagnosis: HTN (hypertension)  Assessment and Plan of Treatment: Low salt diet  Activity as tolerated.  Take all medication as prescribed.  Follow up with your medical doctor for routine blood pressure monitoring at your next visit.  Notify your doctor if you have any of the following symptoms:   Dizziness, Lightheadedness, Blurry vision, Headache, Chest pain, Shortness of breath      Diagnosis: HLD (hyperlipidemia)  Assessment and Plan of Treatment: Follow up with PCP for treatment goals, continue medication, have liver function testing every 3 months as anti lipid medications can cause liver irritation, eat low fat, low cholesterol meals

## 2022-03-30 NOTE — H&P ADULT - PROBLEM SELECTOR PLAN 1
-suspect possibly orthostasis in s/o dec PO intake vs vasovagal(emotional distress) vs cardiac vs less likely neurologic  -obtain orthostats  -encourage PO hydration  -tele monitoring  -TTE in AM  -Neuro checks per neuro  -f/u UA

## 2022-03-30 NOTE — CONSULT NOTE ADULT - SUBJECTIVE AND OBJECTIVE BOX
DATE OF SERVICE: 22 @ 16:15    CHIEF COMPLAINT: Patient is a 82y old  Female who presents with a chief complaint of syncope (30 Mar 2022 15:54)    HISTORY OF PRESENT ILLNESS:  82F PMH HTN, HLD p/w syncopal episode. Pt had been shopping at [employer] for 's  yesterday evening around 8pm when she states having vision changes and cannot remember what happened right after. Per daughter who witnessed event, pt had LOC and she was able to catch her fall. States pt regained consciousness after about 15secs and was given candy which made her feel better. Denied generalized shaking, urinary incontinence, tongue biting or HT. Pt denies prior episode  or being at the time emotionally overwhelmed. She does reports not having anything to eat since the AM. Denied CP, SOB, palpitations, abdominal pain, melena/hematochezia, h/o seizure d/o or CVA    ED course: Afebrile. HDS. Stroke code activated. (30 Mar 2022 06:47)      PAST MEDICAL & SURGICAL HISTORY:  HLD (hyperlipidemia)  HTN (hypertension)  S/P hysterectomy  Benign teratoma      MEDICATIONS:  amLODIPine   Tablet 10 milliGRAM(s) Oral daily  aspirin enteric coated 81 milliGRAM(s) Oral daily  enoxaparin Injectable 40 milliGRAM(s) SubCutaneous every 24 hours        acetaminophen     Tablet .. 650 milliGRAM(s) Oral every 6 hours PRN  melatonin 3 milliGRAM(s) Oral at bedtime PRN  ondansetron Injectable 4 milliGRAM(s) IV Push every 8 hours PRN    aluminum hydroxide/magnesium hydroxide/simethicone Suspension 30 milliLiter(s) Oral every 4 hours PRN    atorvastatin 20 milliGRAM(s) Oral at bedtime  dextrose 50% Injectable 25 Gram(s) IV Push once  dextrose Oral Gel 15 Gram(s) Oral once PRN  glucagon  Injectable 1 milliGRAM(s) IntraMuscular once  insulin lispro (ADMELOG) corrective regimen sliding scale   SubCutaneous three times a day before meals    dextrose 5%. 1000 milliLiter(s) IV Continuous <Continuous>      FAMILY HISTORY:  No pertinent family history in first degree relatives      Non-contributory    SOCIAL HISTORY:    Former smoker.    Allergies    No Known Allergies    Intolerances      REVIEW OF SYSTEMS:  CONSTITUTIONAL: No fever  EYES: No eye pain, visual disturbances, or discharge  ENMT:  No difficulty hearing, tinnitus  NECK: No pain or stiffness  RESPIRATORY: No cough, wheezing,  CARDIOVASCULAR: No chest pain, palpitations, passing out, dizziness, or leg swelling  GASTROINTESTINAL:  No nausea, vomiting, diarrhea or constipation. No melena.  GENITOURINARY: No dysuria, hematuria  NEUROLOGICAL: No stroke like symptoms  SKIN: No burning or lesions   ENDOCRINE: No heat or cold intolerance  MUSCULOSKELETAL: No joint pain or swelling  PSYCHIATRIC: No  anxiety, mood swings  HEME/LYMPH: No bleeding gums  ALLERGY AND IMMUNOLOGIC: No hives or eczema	    All other ROS negative    PHYSICAL EXAM:  T(C): 36.7 (22 @ 12:13), Max: 37.1 (22 @ 10:02)  HR: 72 (22 @ 15:28) (62 - 75)  BP: 155/63 (22 @ 15:28) (100/87 - 159/77)  RR: 18 (22 @ 12:13) (12 - 18)  SpO2: 100% (22 @ 15:28) (98% - 100%)  Wt(kg): --  I&O's Summary    30 Mar 2022 07:01  -  30 Mar 2022 16:15  --------------------------------------------------------  IN: 300 mL / OUT: 400 mL / NET: -100 mL        Appearance: Normal	  HEENT:   Normal oral mucosa, EOMI	  Cardiovascular:  S1 S2, No JVD,    Respiratory: Lungs clear to auscultation	  Psychiatry: Alert  Gastrointestinal:  Soft, Non-tender, + BS	  Skin: No rashes   Neurologic: Non-focal  Extremities:  No edema  Vascular: Peripheral pulses palpable    	    	  	  CARDIAC MARKERS:  Labs personally reviewed by me                            13.3   6.73  )-----------( 204      ( 29 Mar 2022 22:12 )             42.4         140  |  101  |  20  ----------------------------<  147<H>  4.0   |  26  |  0.71    Ca    9.9      29 Mar 2022 22:12  Phos  3.8     03-30  Mg     2.0     03-29    TPro  8.0  /  Alb  4.5  /  TBili  0.3  /  DBili  x   /  AST  18  /  ALT  16  /  AlkPhos  117            EKG: Personally reviewed by me -  NSR   Radiology: Personally reviewed by me -     TTE 3/30/22:  Conclusions:  1. Calcified aortic valve with decreased opening.  2. Increased relative wall thickness with normal left  ventricular mass index, consistent with concentric left  ventricular remodeling.  3. Normal left ventricular systolic function. No segmental  wall motion abnormalities.  4. Normal right ventricular size and function.  5. Estimated pulmonary artery systolic pressure equals 37  mm Hg, assuming right atrial pressure equals 8 mmHg,  consistent with borderline pulmonary pressures.    CT Angio Neck w/ IV 3/29/22:  IMPRESSION:  CT HEAD: No acute intracranial hemorrhage..  CTA BRAIN:  1. Patent intracranial circulation without flow limiting stenosis or   occlusion.  CTA NECK:  1. Prominent calcification at the origin of the right vertebral artery   obscures evaluation for patency. The remainder of the vertebral arteries   are patent.      Assessment /Plan:     Ms. Arevalo is an 82 year old female with PMH of HTN, HLD and pre-DM who had syncopal episode while shopping for her 's . Syncope witnessed by patient's daughter. Patient lost consciousness for 15 seconds. States she ate a candy and felt slightly better. Describes the event "like the lights went out".     Problem#1: Syncope  -TTE reveals mild AS and normal LVEF.  -HR and BP wnl  -No further inpatient cardiac work up necessary  -Outpatient event monitor to r/o occult arrythmia as cause of syncope although episode was likely stress induced.    Problem#2 HTN  -Reports not taking amlodipine recently due to lack if medication refill  -c/w amlodipine  -close follow up with primary cardiologist upon DC    Problem#3 HLD  -c/w simvastatin    Problem#4 Pre-Diabetes  -Check HgbA1C  -ISS as per primary team    Differential diagnosis and plan of care discussed with patient after the evaluation. Counseling on diet, nutritional counseling, weight management, exercise and medication compliance was done.   Advanced care planning/advanced directives discussed with patient/family. DNR status including forceful chest compressions to attempt to restart the heart, ventilator support/artificial breathing, electric shock, artificial nutrition, health care proxy, Molst form all discussed with pt. Pt wishes to consider. More than fifteen minutes spent on discussing advanced directives.     Gemma Perez [facility]  Nathan Jones DO [facility]  Cardiovascular Medicine  [address] [place] [address], [address]  Office [phone]  Cell [phone]

## 2022-03-30 NOTE — DISCHARGE NOTE PROVIDER - HOSPITAL COURSE
82F PMH HTN, HLD p/w ?syncopal episode. Pt had been shopping at [employer] for 's  yesterday evening around 8pm when she states having vision changes and cannot remember what happened right after. Per daughter who witnessed event, pt had LOC and she was able to catch her fall. States pt regained consciousness after about 15secs and was given candy which made her feel better. Denied generalized shaking, urinary incontinence, tongue biting or HT. Pt denies prior episode  or being at the time emotionally overwhelmed. She does reports not having anything to eat since the AM. Denied CP, SOB, palpitations, abdominal pain, melena/hematochezia, h/o seizure d/o or CVA.  Seen and cleared by neurology and cardiology for outpatient follow up.  Ambulatory without assistive device or cardio-neurological symptoms.  Endorses intense emotions and stress from recent loss of .  Counseled on slow posture change, hydration, and emotional healing. 82F PMH HTN, HLD p/w ?syncopal episode. Pt had been shopping at [employer] for 's  yesterday evening around 8pm when she states having vision changes and cannot remember what happened right after. Per daughter who witnessed event, pt had LOC and she was able to catch her fall. States pt regained consciousness after about 15secs and was given candy which made her feel better. Denied generalized shaking, urinary incontinence, tongue biting or HT. Pt denies prior episode  or being at the time emotionally overwhelmed. She does reports not having anything to eat since the AM. Denied CP, SOB, palpitations, abdominal pain, melena/hematochezia, h/o seizure d/o or CVA.  Seen and cleared by neurology and cardiology for outpatient follow up.  Ambulatory without assistive device or cardio-neurological symptoms.  Endorses intense emotions and stress from recent loss of .  Counseled on slow posture change, hydration, and emotional healing.    CT HEAD: No acute intracranial hemorrhage..    CTA BRAIN:  1. Patent intracranial circulation without flow limiting stenosis or   occlusion.    CTA NECK:  1. Prominent calcification at the origin of the right vertebral artery   obscures evaluation for patency. The remainder of the vertebral arteries   are patent.      TTE  Conclusions:  1. Calcified aortic valve with decreased opening.  2. Increased relative wall thickness with normal left  ventricular mass index, consistent with concentric left  ventricular remodeling.  3. Normal left ventricular systolic function. No segmental  wall motion abnormalities.  4. Normal right ventricular size and function.  5. Estimated pulmonary artery systolic pressure equals 37  mm Hg, assuming right atrial pressure equals 8 mm Hg,  consistent with borderline pulmonary pressures.    Seen on day of exam:    NAD  lungs clear  RRR  abdomen soft  no edema  normal gait

## 2022-03-30 NOTE — H&P ADULT - NSHPPHYSICALEXAM_GEN_ALL_CORE
Vital Signs Last 24 Hrs  T(C): 36.9 (30 Mar 2022 03:45), Max: 36.9 (30 Mar 2022 03:45)  T(F): 98.5 (30 Mar 2022 03:45), Max: 98.5 (30 Mar 2022 03:45)  HR: 75 (30 Mar 2022 03:45) (67 - 75)  BP: 114/65 (30 Mar 2022 03:45) (100/87 - 159/77)  BP(mean): 96 (30 Mar 2022 03:29) (96 - 96)  RR: 17 (30 Mar 2022 03:45) (12 - 18)  SpO2: 98% (30 Mar 2022 03:45) (98% - 100%)

## 2022-03-30 NOTE — PHYSICAL THERAPY INITIAL EVALUATION ADULT - ADDITIONAL COMMENTS
Pt recently , lives alone in apt on the 8th floor w/ no steps to enter from front entrance of building, +3 steps when entering through back entrance +hand rail, +elevator, +tub, +grab bar. Pt independent and does not require DME.

## 2022-03-30 NOTE — H&P ADULT - NSHPSOCIALHISTORY_GEN_ALL_CORE
Currently lives at home by herself in apartment close to her daughter  Denied tobacco, ETOH or illicit drugs  Independent of all ADLs

## 2022-04-11 NOTE — CHART NOTE - NSCHARTNOTEFT_GEN_A_CORE
2  attempt(s) were made to reach patient, which have been unsuccessful. Unable to leave voicemail on (date).
Patient requested call back on 04/05/22 or 04/06/22.
Patient requested call back on 04/11/2022
Left  message for patient in regards to follow up care with callback information.

## 2022-12-28 ENCOUNTER — TRANSCRIPTION ENCOUNTER (OUTPATIENT)
Age: 82
End: 2022-12-28

## 2022-12-28 ENCOUNTER — INPATIENT (INPATIENT)
Facility: HOSPITAL | Age: 82
LOS: 6 days | Discharge: NURSING, REHAB OR LONG-TERM CARE | DRG: 955 | End: 2023-01-04
Attending: NEUROLOGICAL SURGERY | Admitting: NEUROLOGICAL SURGERY
Payer: MEDICARE

## 2022-12-28 VITALS
OXYGEN SATURATION: 99 % | WEIGHT: 138.01 LBS | HEART RATE: 70 BPM | RESPIRATION RATE: 20 BRPM | SYSTOLIC BLOOD PRESSURE: 92 MMHG | TEMPERATURE: 98 F | HEIGHT: 66 IN | DIASTOLIC BLOOD PRESSURE: 63 MMHG

## 2022-12-28 DIAGNOSIS — Z90.710 ACQUIRED ABSENCE OF BOTH CERVIX AND UTERUS: Chronic | ICD-10-CM

## 2022-12-28 DIAGNOSIS — D36.9 BENIGN NEOPLASM, UNSPECIFIED SITE: Chronic | ICD-10-CM

## 2022-12-28 DIAGNOSIS — S06.5XAA TRAUMATIC SUBDURAL HEMORRHAGE WITH LOSS OF CONSCIOUSNESS STATUS UNKNOWN, INITIAL ENCOUNTER: ICD-10-CM

## 2022-12-28 LAB
ALBUMIN SERPL ELPH-MCNC: 3.9 G/DL — SIGNIFICANT CHANGE UP (ref 3.3–5)
ALP SERPL-CCNC: 111 U/L — SIGNIFICANT CHANGE UP (ref 40–120)
ALT FLD-CCNC: 112 U/L — HIGH (ref 10–45)
ANION GAP SERPL CALC-SCNC: 18 MMOL/L — HIGH (ref 5–17)
APTT BLD: 25 SEC — LOW (ref 27.5–35.5)
AST SERPL-CCNC: 426 U/L — HIGH (ref 10–40)
BASE EXCESS BLDV CALC-SCNC: 0.9 MMOL/L — SIGNIFICANT CHANGE UP (ref -2–3)
BASE EXCESS BLDV CALC-SCNC: 3.2 MMOL/L — HIGH (ref -2–3)
BASOPHILS # BLD AUTO: 0.02 K/UL — SIGNIFICANT CHANGE UP (ref 0–0.2)
BASOPHILS NFR BLD AUTO: 0.1 % — SIGNIFICANT CHANGE UP (ref 0–2)
BILIRUB SERPL-MCNC: 0.8 MG/DL — SIGNIFICANT CHANGE UP (ref 0.2–1.2)
BUN SERPL-MCNC: 27 MG/DL — HIGH (ref 7–23)
CA-I SERPL-SCNC: 1.14 MMOL/L — LOW (ref 1.15–1.33)
CA-I SERPL-SCNC: 1.15 MMOL/L — SIGNIFICANT CHANGE UP (ref 1.15–1.33)
CALCIUM SERPL-MCNC: 9.6 MG/DL — SIGNIFICANT CHANGE UP (ref 8.4–10.5)
CHLORIDE BLDV-SCNC: 98 MMOL/L — SIGNIFICANT CHANGE UP (ref 96–108)
CHLORIDE BLDV-SCNC: 99 MMOL/L — SIGNIFICANT CHANGE UP (ref 96–108)
CHLORIDE SERPL-SCNC: 99 MMOL/L — SIGNIFICANT CHANGE UP (ref 96–108)
CK SERPL-CCNC: CRITICAL HIGH U/L (ref 25–170)
CO2 BLDV-SCNC: 29 MMOL/L — HIGH (ref 22–26)
CO2 BLDV-SCNC: 31 MMOL/L — HIGH (ref 22–26)
CO2 SERPL-SCNC: 22 MMOL/L — SIGNIFICANT CHANGE UP (ref 22–31)
CREAT SERPL-MCNC: 0.77 MG/DL — SIGNIFICANT CHANGE UP (ref 0.5–1.3)
EGFR: 77 ML/MIN/1.73M2 — SIGNIFICANT CHANGE UP
EOSINOPHIL # BLD AUTO: 0 K/UL — SIGNIFICANT CHANGE UP (ref 0–0.5)
EOSINOPHIL NFR BLD AUTO: 0 % — SIGNIFICANT CHANGE UP (ref 0–6)
GAS PNL BLDV: 133 MMOL/L — LOW (ref 136–145)
GAS PNL BLDV: 136 MMOL/L — SIGNIFICANT CHANGE UP (ref 136–145)
GAS PNL BLDV: SIGNIFICANT CHANGE UP
GAS PNL BLDV: SIGNIFICANT CHANGE UP
GLUCOSE BLDV-MCNC: 141 MG/DL — HIGH (ref 70–99)
GLUCOSE BLDV-MCNC: 214 MG/DL — HIGH (ref 70–99)
GLUCOSE SERPL-MCNC: 140 MG/DL — HIGH (ref 70–99)
HCO3 BLDV-SCNC: 28 MMOL/L — SIGNIFICANT CHANGE UP (ref 22–29)
HCO3 BLDV-SCNC: 29 MMOL/L — SIGNIFICANT CHANGE UP (ref 22–29)
HCT VFR BLD CALC: 50.1 % — HIGH (ref 34.5–45)
HCT VFR BLDA CALC: 42 % — SIGNIFICANT CHANGE UP (ref 34.5–46.5)
HCT VFR BLDA CALC: 50 % — HIGH (ref 34.5–46.5)
HGB BLD CALC-MCNC: 14 G/DL — SIGNIFICANT CHANGE UP (ref 11.7–16.1)
HGB BLD CALC-MCNC: 16.5 G/DL — HIGH (ref 11.7–16.1)
HGB BLD-MCNC: 16 G/DL — HIGH (ref 11.5–15.5)
IMM GRANULOCYTES NFR BLD AUTO: 0.5 % — SIGNIFICANT CHANGE UP (ref 0–0.9)
INR BLD: 1.17 RATIO — HIGH (ref 0.88–1.16)
LACTATE BLDV-MCNC: 2.9 MMOL/L — HIGH (ref 0.5–2)
LACTATE BLDV-MCNC: 4.8 MMOL/L — CRITICAL HIGH (ref 0.5–2)
LYMPHOCYTES # BLD AUTO: 0.93 K/UL — LOW (ref 1–3.3)
LYMPHOCYTES # BLD AUTO: 6.1 % — LOW (ref 13–44)
MAGNESIUM SERPL-MCNC: 2.2 MG/DL — SIGNIFICANT CHANGE UP (ref 1.6–2.6)
MCHC RBC-ENTMCNC: 26.4 PG — LOW (ref 27–34)
MCHC RBC-ENTMCNC: 31.9 GM/DL — LOW (ref 32–36)
MCV RBC AUTO: 82.7 FL — SIGNIFICANT CHANGE UP (ref 80–100)
MONOCYTES # BLD AUTO: 1.03 K/UL — HIGH (ref 0–0.9)
MONOCYTES NFR BLD AUTO: 6.8 % — SIGNIFICANT CHANGE UP (ref 2–14)
NEUTROPHILS # BLD AUTO: 13.18 K/UL — HIGH (ref 1.8–7.4)
NEUTROPHILS NFR BLD AUTO: 86.5 % — HIGH (ref 43–77)
NRBC # BLD: 0 /100 WBCS — SIGNIFICANT CHANGE UP (ref 0–0)
OTHER CELLS CSF MANUAL: 5.3 ML/DL — LOW (ref 18–22)
PCO2 BLDV: 48 MMHG — HIGH (ref 39–42)
PCO2 BLDV: 50 MMHG — HIGH (ref 39–42)
PH BLDV: 7.35 — SIGNIFICANT CHANGE UP (ref 7.32–7.43)
PH BLDV: 7.39 — SIGNIFICANT CHANGE UP (ref 7.32–7.43)
PHOSPHATE SERPL-MCNC: 4.3 MG/DL — SIGNIFICANT CHANGE UP (ref 2.5–4.5)
PLATELET # BLD AUTO: 216 K/UL — SIGNIFICANT CHANGE UP (ref 150–400)
PO2 BLDV: 20 MMHG — LOW (ref 25–45)
PO2 BLDV: 32 MMHG — SIGNIFICANT CHANGE UP (ref 25–45)
POTASSIUM BLDV-SCNC: 4.1 MMOL/L — SIGNIFICANT CHANGE UP (ref 3.5–5.1)
POTASSIUM BLDV-SCNC: 4.9 MMOL/L — SIGNIFICANT CHANGE UP (ref 3.5–5.1)
POTASSIUM SERPL-MCNC: 4.7 MMOL/L — SIGNIFICANT CHANGE UP (ref 3.5–5.3)
POTASSIUM SERPL-SCNC: 4.7 MMOL/L — SIGNIFICANT CHANGE UP (ref 3.5–5.3)
PROT SERPL-MCNC: 7.7 G/DL — SIGNIFICANT CHANGE UP (ref 6–8.3)
PROTHROM AB SERPL-ACNC: 13.5 SEC — HIGH (ref 10.5–13.4)
RAPID RVP RESULT: SIGNIFICANT CHANGE UP
RBC # BLD: 6.06 M/UL — HIGH (ref 3.8–5.2)
RBC # FLD: 14.6 % — HIGH (ref 10.3–14.5)
SAO2 % BLDV: 27 % — LOW (ref 67–88)
SAO2 % BLDV: 45.5 % — LOW (ref 67–88)
SARS-COV-2 RNA SPEC QL NAA+PROBE: SIGNIFICANT CHANGE UP
SODIUM SERPL-SCNC: 139 MMOL/L — SIGNIFICANT CHANGE UP (ref 135–145)
TROPONIN T, HIGH SENSITIVITY RESULT: 320 NG/L — HIGH (ref 0–51)
TROPONIN T, HIGH SENSITIVITY RESULT: 347 NG/L — HIGH (ref 0–51)
WBC # BLD: 15.24 K/UL — HIGH (ref 3.8–10.5)
WBC # FLD AUTO: 15.24 K/UL — HIGH (ref 3.8–10.5)

## 2022-12-28 PROCEDURE — 70450 CT HEAD/BRAIN W/O DYE: CPT | Mod: 26,MA

## 2022-12-28 PROCEDURE — 99291 CRITICAL CARE FIRST HOUR: CPT

## 2022-12-28 PROCEDURE — 72170 X-RAY EXAM OF PELVIS: CPT | Mod: 26

## 2022-12-28 PROCEDURE — 71045 X-RAY EXAM CHEST 1 VIEW: CPT | Mod: 26

## 2022-12-28 RX ORDER — ONDANSETRON 8 MG/1
4 TABLET, FILM COATED ORAL EVERY 6 HOURS
Refills: 0 | Status: DISCONTINUED | OUTPATIENT
Start: 2022-12-28 | End: 2022-12-29

## 2022-12-28 RX ORDER — SODIUM CHLORIDE 9 MG/ML
1000 INJECTION INTRAMUSCULAR; INTRAVENOUS; SUBCUTANEOUS
Refills: 0 | Status: DISCONTINUED | OUTPATIENT
Start: 2022-12-28 | End: 2022-12-29

## 2022-12-28 RX ORDER — ACETAMINOPHEN 500 MG
650 TABLET ORAL EVERY 6 HOURS
Refills: 0 | Status: DISCONTINUED | OUTPATIENT
Start: 2022-12-28 | End: 2022-12-29

## 2022-12-28 RX ORDER — SODIUM CHLORIDE 9 MG/ML
1000 INJECTION, SOLUTION INTRAVENOUS ONCE
Refills: 0 | Status: COMPLETED | OUTPATIENT
Start: 2022-12-28 | End: 2022-12-28

## 2022-12-28 RX ORDER — AMLODIPINE BESYLATE 2.5 MG/1
10 TABLET ORAL DAILY
Refills: 0 | Status: DISCONTINUED | OUTPATIENT
Start: 2022-12-28 | End: 2022-12-29

## 2022-12-28 RX ORDER — PIPERACILLIN AND TAZOBACTAM 4; .5 G/20ML; G/20ML
3.38 INJECTION, POWDER, LYOPHILIZED, FOR SOLUTION INTRAVENOUS ONCE
Refills: 0 | Status: COMPLETED | OUTPATIENT
Start: 2022-12-28 | End: 2022-12-28

## 2022-12-28 RX ORDER — ATORVASTATIN CALCIUM 80 MG/1
20 TABLET, FILM COATED ORAL AT BEDTIME
Refills: 0 | Status: DISCONTINUED | OUTPATIENT
Start: 2022-12-28 | End: 2022-12-29

## 2022-12-28 RX ORDER — VANCOMYCIN HCL 1 G
1000 VIAL (EA) INTRAVENOUS ONCE
Refills: 0 | Status: COMPLETED | OUTPATIENT
Start: 2022-12-28 | End: 2022-12-28

## 2022-12-28 RX ORDER — LEVETIRACETAM 250 MG/1
500 TABLET, FILM COATED ORAL EVERY 12 HOURS
Refills: 0 | Status: DISCONTINUED | OUTPATIENT
Start: 2022-12-28 | End: 2022-12-29

## 2022-12-28 RX ORDER — SODIUM CHLORIDE 9 MG/ML
1000 INJECTION INTRAMUSCULAR; INTRAVENOUS; SUBCUTANEOUS ONCE
Refills: 0 | Status: COMPLETED | OUTPATIENT
Start: 2022-12-28 | End: 2022-12-28

## 2022-12-28 RX ORDER — POLYETHYLENE GLYCOL 3350 17 G/17G
17 POWDER, FOR SOLUTION ORAL DAILY
Refills: 0 | Status: DISCONTINUED | OUTPATIENT
Start: 2022-12-28 | End: 2022-12-29

## 2022-12-28 RX ORDER — DESMOPRESSIN ACETATE 0.1 MG/1
19 TABLET ORAL ONCE
Refills: 0 | Status: COMPLETED | OUTPATIENT
Start: 2022-12-28 | End: 2022-12-28

## 2022-12-28 RX ORDER — SENNA PLUS 8.6 MG/1
2 TABLET ORAL AT BEDTIME
Refills: 0 | Status: DISCONTINUED | OUTPATIENT
Start: 2022-12-28 | End: 2022-12-29

## 2022-12-28 RX ADMIN — Medication 250 MILLIGRAM(S): at 21:46

## 2022-12-28 RX ADMIN — PIPERACILLIN AND TAZOBACTAM 200 GRAM(S): 4; .5 INJECTION, POWDER, LYOPHILIZED, FOR SOLUTION INTRAVENOUS at 20:56

## 2022-12-28 RX ADMIN — SODIUM CHLORIDE 1000 MILLILITER(S): 9 INJECTION INTRAMUSCULAR; INTRAVENOUS; SUBCUTANEOUS at 20:17

## 2022-12-28 RX ADMIN — SODIUM CHLORIDE 1000 MILLILITER(S): 9 INJECTION, SOLUTION INTRAVENOUS at 21:46

## 2022-12-28 RX ADMIN — DESMOPRESSIN ACETATE 219 MICROGRAM(S): 0.1 TABLET ORAL at 23:45

## 2022-12-28 RX ADMIN — LEVETIRACETAM 400 MILLIGRAM(S): 250 TABLET, FILM COATED ORAL at 23:24

## 2022-12-28 NOTE — ED ADULT NURSE NOTE - TEMPERATURE IN FAHRENHEIT (DEGREES F)
DATE:  6/28/21  9:00am this morning  TIME OF RECEIPT FROM LAB:  10:13am  LAB TEST:  Bilirubin Total  LAB VALUE:  17.5  RESULTS PAGED TO (PROVIDER):  Kimberley PEARSON  TIME LAB VALUE REPORTED TO PROVIDER: 10:17am  10:21 Kimberley Cooney aware, will follow up with Dr. Simmons and KATHERIN Oliva on next steps for pt.          96.9

## 2022-12-28 NOTE — ED PROVIDER NOTE - PROGRESS NOTE DETAILS
Teodoro Arriola PGY3: Called by radiology. CT w/ large subdural, acute and subacute components, 8 mm of shift small amount of subfalcine herniation. Neuro surg evaluated pt and pt accepted for admission to NSICU.

## 2022-12-28 NOTE — ED PROVIDER NOTE - PHYSICAL EXAMINATION
Physical Exam:  Gen: NAD, AOx2, non-toxic appearing  Head: normal appearing  HEENT: normal conjunctiva, oral mucosa moist  Lung:  no respiratory distress, speaking in full sentences, clear to ascultation bilaterally     CV: regular rate and rhythm   Abd: soft, ND, NT  MSK: no visible deformities, thomas x4, no c/t/l spine midline ttp   Neuro: No focal deficits  Skin: Warm  Psych: normal affect  ~Micah Murphy D.O. -ED Attending

## 2022-12-28 NOTE — H&P ADULT - HISTORY OF PRESENT ILLNESS
82F on ASA (last took 1-2d ago), hx HTN, HLD, glaucoma. Fall yest evening or this AM (pt doesn’t remember). CTH: largely chronic L SDH (approx 2cm) w/ 8mm MLS. Exam: AOx2, PERRL, EOMI, no facial, slight R drift, LOWREY at least 4+/5.    ICU Vital Signs Last 24 Hrs  T(C): 36.1 (28 Dec 2022 19:28), Max: 36.4 (28 Dec 2022 19:02)  T(F): 96.9 (28 Dec 2022 19:28), Max: 97.5 (28 Dec 2022 19:02)  HR: 82 (28 Dec 2022 19:28) (70 - 82)  BP: 94/61 (28 Dec 2022 19:28) (92/63 - 94/61)  BP(mean): --  ABP: --  ABP(mean): --  RR: 14 (28 Dec 2022 19:28) (14 - 20)  SpO2: 95% (28 Dec 2022 19:28) (95% - 99%)    O2 Parameters below as of 28 Dec 2022 19:28  Patient On (Oxygen Delivery Method): room air                              16.0   15.24 )-----------( 216      ( 28 Dec 2022 19:41 )             50.1   12-28    139  |  99  |  27<H>  ----------------------------<  140<H>  4.7   |  22  |  0.77    Ca    9.6      28 Dec 2022 19:41  Phos  4.3     12-28  Mg     2.2     12-28    TPro  7.7  /  Alb  3.9  /  TBili  0.8  /  DBili  x   /  AST  426<H>  /  ALT  112<H>  /  AlkPhos  111  12-28

## 2022-12-28 NOTE — ED ADULT TRIAGE NOTE - CHIEF COMPLAINT QUOTE
as per EMS, patient slid to floor last PM and has been on floor since.  Patient without complaints but had RA PO of 80% upon EMS arrival

## 2022-12-28 NOTE — ED PROVIDER NOTE - ATTENDING CONTRIBUTION TO CARE
I, Micah Murphy, performed a history and physical exam of the patient and discussed their management with the resident and/or advanced care provider. I reviewed the resident and/or advanced care provider's note and agree with the documented findings and plan of care. I was present and available for all procedures.    See my full note for details

## 2022-12-28 NOTE — ED PROVIDER NOTE - OBJECTIVE STATEMENT
lolis attending- 82-year-old female past medical history of hypertension hyperlipidemia presenting with EMS and daughter found on floor this evening.  Daughter reports last seen yesterday afternoon talk to her yesterday and this morning patient was able to call her and tell her that she fell yesterday evening and this morning was able to crawl into bed but was confused.  Unknown details of fall only blood thinner is aspirin. patient aox2 denies current complaints. patient hypotensive 90/60 on presentation. Denies other recent trauma, fevers, chills, headache, dizziness, nausea, vomiting, dysuria, freq, hematuria, diarrhea, constipation, chest pain, shortness of breath, cough.

## 2022-12-28 NOTE — ED PROVIDER NOTE - CAS EDP CONSULT REGARDING 1
Carac Pregnancy And Lactation Text: This medication is Pregnancy Category X and contraindicated in pregnancy and in women who may become pregnant. It is unknown if this medication is excreted in breast milk. consult

## 2022-12-28 NOTE — H&P ADULT - ASSESSMENT
PIPO MARTEL ([place] 26)  82F on ASA (last took 1-2d ago), hx HTN, HLD, glaucoma. Fall yest evening or this AM (pt doesn’t remember). CTH: largely chronic L SDH (approx 2cm) w/ 8mm MLS. Exam: AOx2, PERRL, EOMI, no facial, slight R drift, LOWERY at least 4+/5.  -NSCU, q1h neuro checks  -Check ARU  -Preopped and added on for francis hole tomorrow  -4h repeat CTH ~2am  -Keppra 500BID  -DDAVP

## 2022-12-28 NOTE — ED PROVIDER NOTE - CLINICAL SUMMARY MEDICAL DECISION MAKING FREE TEXT BOX
lolis attending- Fall unknown details only blood thinner aspirin reassuring exam despite vital signs of hypotension likely metabolic versus infectious weakness GCS 15 ANO x2 fluctuating as per daughter otherwise we will evaluate with screening labs urine analysis chest x-ray pelvic x-ray CT head close monitoring disposition for admission unlikely ACS PE pneumothorax dissection AAA.  Discussed with daughter and patient at bedside agreeable with plan IV fluid resuscitation held antibiotics for further work-up at this time

## 2022-12-28 NOTE — ED ADULT NURSE NOTE - OBJECTIVE STATEMENT
82y F A&Ox3 BIBEMS from home because she was found on bedroom floor by daughter today. Daughter at bedside states that pt said she "Did not feel well" today causing the daughter to go and check on pt.  Pt was found on floor and is not complaining of pain. Pt states she felt confused for a bit but is back to baseline. Pt also states that she took Dulcolax 2 days ago for some mild constipation.  Daughter states that the bathroom, kitchen and bedroom was covered in feces.  Denies any N/V/D/Ha/SOB/CP/Gi/Gu symptoms. PMH of Pre-Dm, HTN and glaucoma. PSH of hysterectomy.

## 2022-12-29 ENCOUNTER — APPOINTMENT (OUTPATIENT)
Dept: NEUROSURGERY | Facility: HOSPITAL | Age: 82
End: 2022-12-29

## 2022-12-29 LAB
A1C WITH ESTIMATED AVERAGE GLUCOSE RESULT: 6.1 % — HIGH (ref 4–5.6)
ALBUMIN SERPL ELPH-MCNC: 3.2 G/DL — LOW (ref 3.3–5)
ALP SERPL-CCNC: 86 U/L — SIGNIFICANT CHANGE UP (ref 40–120)
ALT FLD-CCNC: 93 U/L — HIGH (ref 10–45)
ANION GAP SERPL CALC-SCNC: 12 MMOL/L — SIGNIFICANT CHANGE UP (ref 5–17)
ANION GAP SERPL CALC-SCNC: 9 MMOL/L — SIGNIFICANT CHANGE UP (ref 5–17)
APPEARANCE UR: CLEAR — SIGNIFICANT CHANGE UP
APTT BLD: 23.4 SEC — LOW (ref 27.5–35.5)
AST SERPL-CCNC: 329 U/L — HIGH (ref 10–40)
BACTERIA # UR AUTO: ABNORMAL
BILIRUB SERPL-MCNC: 0.9 MG/DL — SIGNIFICANT CHANGE UP (ref 0.2–1.2)
BILIRUB UR-MCNC: NEGATIVE — SIGNIFICANT CHANGE UP
BLD GP AB SCN SERPL QL: NEGATIVE — SIGNIFICANT CHANGE UP
BUN SERPL-MCNC: 24 MG/DL — HIGH (ref 7–23)
BUN SERPL-MCNC: 30 MG/DL — HIGH (ref 7–23)
CALCIUM SERPL-MCNC: 8.1 MG/DL — LOW (ref 8.4–10.5)
CALCIUM SERPL-MCNC: 8.8 MG/DL — SIGNIFICANT CHANGE UP (ref 8.4–10.5)
CHLORIDE SERPL-SCNC: 103 MMOL/L — SIGNIFICANT CHANGE UP (ref 96–108)
CHLORIDE SERPL-SCNC: 109 MMOL/L — HIGH (ref 96–108)
CHOLEST SERPL-MCNC: 157 MG/DL — SIGNIFICANT CHANGE UP
CK MB BLD-MCNC: 0.5 % — SIGNIFICANT CHANGE UP (ref 0–3.5)
CK MB CFR SERPL CALC: 40 NG/ML — HIGH (ref 0–3.8)
CK SERPL-CCNC: 7407 U/L — HIGH (ref 25–170)
CO2 SERPL-SCNC: 25 MMOL/L — SIGNIFICANT CHANGE UP (ref 22–31)
CO2 SERPL-SCNC: 25 MMOL/L — SIGNIFICANT CHANGE UP (ref 22–31)
COLOR SPEC: YELLOW — SIGNIFICANT CHANGE UP
CREAT SERPL-MCNC: 0.61 MG/DL — SIGNIFICANT CHANGE UP (ref 0.5–1.3)
CREAT SERPL-MCNC: 0.91 MG/DL — SIGNIFICANT CHANGE UP (ref 0.5–1.3)
DIFF PNL FLD: ABNORMAL
EGFR: 63 ML/MIN/1.73M2 — SIGNIFICANT CHANGE UP
EGFR: 89 ML/MIN/1.73M2 — SIGNIFICANT CHANGE UP
EPI CELLS # UR: 3 /HPF — SIGNIFICANT CHANGE UP
ESTIMATED AVERAGE GLUCOSE: 128 MG/DL — HIGH (ref 68–114)
GLUCOSE BLDC GLUCOMTR-MCNC: 132 MG/DL — HIGH (ref 70–99)
GLUCOSE BLDC GLUCOMTR-MCNC: 135 MG/DL — HIGH (ref 70–99)
GLUCOSE BLDC GLUCOMTR-MCNC: 25 MG/DL — CRITICAL LOW (ref 70–99)
GLUCOSE BLDC GLUCOMTR-MCNC: 50 MG/DL — CRITICAL LOW (ref 70–99)
GLUCOSE BLDC GLUCOMTR-MCNC: 68 MG/DL — LOW (ref 70–99)
GLUCOSE BLDC GLUCOMTR-MCNC: 84 MG/DL — SIGNIFICANT CHANGE UP (ref 70–99)
GLUCOSE BLDC GLUCOMTR-MCNC: 93 MG/DL — SIGNIFICANT CHANGE UP (ref 70–99)
GLUCOSE SERPL-MCNC: 135 MG/DL — HIGH (ref 70–99)
GLUCOSE SERPL-MCNC: 99 MG/DL — SIGNIFICANT CHANGE UP (ref 70–99)
GLUCOSE UR QL: NEGATIVE — SIGNIFICANT CHANGE UP
HCT VFR BLD CALC: 38 % — SIGNIFICANT CHANGE UP (ref 34.5–45)
HCT VFR BLD CALC: 43.6 % — SIGNIFICANT CHANGE UP (ref 34.5–45)
HDLC SERPL-MCNC: 69 MG/DL — SIGNIFICANT CHANGE UP
HGB BLD-MCNC: 11.9 G/DL — SIGNIFICANT CHANGE UP (ref 11.5–15.5)
HGB BLD-MCNC: 13.8 G/DL — SIGNIFICANT CHANGE UP (ref 11.5–15.5)
HYALINE CASTS # UR AUTO: 5 /LPF — SIGNIFICANT CHANGE UP (ref 0–7)
INR BLD: 1.21 RATIO — HIGH (ref 0.88–1.16)
KETONES UR-MCNC: ABNORMAL
LACTATE SERPL-SCNC: 1.8 MMOL/L — SIGNIFICANT CHANGE UP (ref 0.5–2)
LEUKOCYTE ESTERASE UR-ACNC: NEGATIVE — SIGNIFICANT CHANGE UP
LIPID PNL WITH DIRECT LDL SERPL: 75 MG/DL — SIGNIFICANT CHANGE UP
MAGNESIUM SERPL-MCNC: 2 MG/DL — SIGNIFICANT CHANGE UP (ref 1.6–2.6)
MAGNESIUM SERPL-MCNC: 2.1 MG/DL — SIGNIFICANT CHANGE UP (ref 1.6–2.6)
MCHC RBC-ENTMCNC: 26 PG — LOW (ref 27–34)
MCHC RBC-ENTMCNC: 26.2 PG — LOW (ref 27–34)
MCHC RBC-ENTMCNC: 31.3 GM/DL — LOW (ref 32–36)
MCHC RBC-ENTMCNC: 31.7 GM/DL — LOW (ref 32–36)
MCV RBC AUTO: 82.3 FL — SIGNIFICANT CHANGE UP (ref 80–100)
MCV RBC AUTO: 83.5 FL — SIGNIFICANT CHANGE UP (ref 80–100)
NITRITE UR-MCNC: NEGATIVE — SIGNIFICANT CHANGE UP
NON HDL CHOLESTEROL: 88 MG/DL — SIGNIFICANT CHANGE UP
NRBC # BLD: 0 /100 WBCS — SIGNIFICANT CHANGE UP (ref 0–0)
NRBC # BLD: 0 /100 WBCS — SIGNIFICANT CHANGE UP (ref 0–0)
PA ADP PRP-ACNC: 227 PRU — SIGNIFICANT CHANGE UP (ref 194–417)
PH UR: 6 — SIGNIFICANT CHANGE UP (ref 5–8)
PHOSPHATE SERPL-MCNC: 2.2 MG/DL — LOW (ref 2.5–4.5)
PHOSPHATE SERPL-MCNC: 3.3 MG/DL — SIGNIFICANT CHANGE UP (ref 2.5–4.5)
PLATELET # BLD AUTO: 129 K/UL — LOW (ref 150–400)
PLATELET # BLD AUTO: 205 K/UL — SIGNIFICANT CHANGE UP (ref 150–400)
PLATELET RESPONSE ASPIRIN RESULT: 574 ARU — SIGNIFICANT CHANGE UP (ref 350–700)
POTASSIUM SERPL-MCNC: 3.6 MMOL/L — SIGNIFICANT CHANGE UP (ref 3.5–5.3)
POTASSIUM SERPL-MCNC: 3.7 MMOL/L — SIGNIFICANT CHANGE UP (ref 3.5–5.3)
POTASSIUM SERPL-SCNC: 3.6 MMOL/L — SIGNIFICANT CHANGE UP (ref 3.5–5.3)
POTASSIUM SERPL-SCNC: 3.7 MMOL/L — SIGNIFICANT CHANGE UP (ref 3.5–5.3)
PROT SERPL-MCNC: 6.4 G/DL — SIGNIFICANT CHANGE UP (ref 6–8.3)
PROT UR-MCNC: ABNORMAL
PROTHROM AB SERPL-ACNC: 14 SEC — HIGH (ref 10.5–13.4)
RBC # BLD: 4.55 M/UL — SIGNIFICANT CHANGE UP (ref 3.8–5.2)
RBC # BLD: 5.3 M/UL — HIGH (ref 3.8–5.2)
RBC # FLD: 14.5 % — SIGNIFICANT CHANGE UP (ref 10.3–14.5)
RBC # FLD: 14.6 % — HIGH (ref 10.3–14.5)
RBC CASTS # UR COMP ASSIST: 5 /HPF — HIGH (ref 0–4)
RH IG SCN BLD-IMP: POSITIVE — SIGNIFICANT CHANGE UP
SODIUM SERPL-SCNC: 140 MMOL/L — SIGNIFICANT CHANGE UP (ref 135–145)
SODIUM SERPL-SCNC: 143 MMOL/L — SIGNIFICANT CHANGE UP (ref 135–145)
SP GR SPEC: 1.02 — SIGNIFICANT CHANGE UP (ref 1.01–1.02)
T3 SERPL-MCNC: 66 NG/DL — LOW (ref 80–200)
T4 AB SER-ACNC: 7.4 UG/DL — SIGNIFICANT CHANGE UP (ref 4.6–12)
TRIGL SERPL-MCNC: 66 MG/DL — SIGNIFICANT CHANGE UP
TROPONIN T, HIGH SENSITIVITY RESULT: 245 NG/L — HIGH (ref 0–51)
TSH SERPL-MCNC: 2.48 UIU/ML — SIGNIFICANT CHANGE UP (ref 0.27–4.2)
UROBILINOGEN FLD QL: NEGATIVE — SIGNIFICANT CHANGE UP
WBC # BLD: 12.25 K/UL — HIGH (ref 3.8–10.5)
WBC # BLD: 8.16 K/UL — SIGNIFICANT CHANGE UP (ref 3.8–10.5)
WBC # FLD AUTO: 12.25 K/UL — HIGH (ref 3.8–10.5)
WBC # FLD AUTO: 8.16 K/UL — SIGNIFICANT CHANGE UP (ref 3.8–10.5)
WBC UR QL: 4 /HPF — SIGNIFICANT CHANGE UP (ref 0–5)

## 2022-12-29 PROCEDURE — 61312 CRNEC/CRNOT STTL XDRL/SDRL: CPT

## 2022-12-29 PROCEDURE — 93970 EXTREMITY STUDY: CPT | Mod: 26

## 2022-12-29 PROCEDURE — 93010 ELECTROCARDIOGRAM REPORT: CPT

## 2022-12-29 PROCEDURE — 99223 1ST HOSP IP/OBS HIGH 75: CPT | Mod: 57

## 2022-12-29 PROCEDURE — 99291 CRITICAL CARE FIRST HOUR: CPT

## 2022-12-29 PROCEDURE — 73502 X-RAY EXAM HIP UNI 2-3 VIEWS: CPT | Mod: 26,RT

## 2022-12-29 PROCEDURE — 93306 TTE W/DOPPLER COMPLETE: CPT | Mod: 26

## 2022-12-29 PROCEDURE — 73552 X-RAY EXAM OF FEMUR 2/>: CPT | Mod: 26,RT

## 2022-12-29 PROCEDURE — 72125 CT NECK SPINE W/O DYE: CPT | Mod: 26

## 2022-12-29 PROCEDURE — 70450 CT HEAD/BRAIN W/O DYE: CPT | Mod: 26

## 2022-12-29 PROCEDURE — 99291 CRITICAL CARE FIRST HOUR: CPT | Mod: 25

## 2022-12-29 DEVICE — SCREW UN3 AXS SELF DRILL 1.5X4MM 5/PK
Type: IMPLANTABLE DEVICE | Site: LEFT | Status: NON-FUNCTIONAL
Removed: 2022-12-29

## 2022-12-29 DEVICE — SURGICEL 2 X 14"
Type: IMPLANTABLE DEVICE | Site: LEFT | Status: NON-FUNCTIONAL
Removed: 2022-12-29

## 2022-12-29 DEVICE — PLATE LRG GAP SHUNT 14MM
Type: IMPLANTABLE DEVICE | Site: LEFT | Status: NON-FUNCTIONAL
Removed: 2022-12-29

## 2022-12-29 RX ORDER — CHLORHEXIDINE GLUCONATE 213 G/1000ML
1 SOLUTION TOPICAL
Refills: 0 | Status: DISCONTINUED | OUTPATIENT
Start: 2022-12-29 | End: 2022-12-29

## 2022-12-29 RX ORDER — SENNA PLUS 8.6 MG/1
2 TABLET ORAL AT BEDTIME
Refills: 0 | Status: DISCONTINUED | OUTPATIENT
Start: 2022-12-29 | End: 2023-01-04

## 2022-12-29 RX ORDER — INSULIN LISPRO 100/ML
VIAL (ML) SUBCUTANEOUS EVERY 6 HOURS
Refills: 0 | Status: DISCONTINUED | OUTPATIENT
Start: 2022-12-29 | End: 2022-12-29

## 2022-12-29 RX ORDER — ONDANSETRON 8 MG/1
4 TABLET, FILM COATED ORAL EVERY 6 HOURS
Refills: 0 | Status: DISCONTINUED | OUTPATIENT
Start: 2022-12-29 | End: 2023-01-04

## 2022-12-29 RX ORDER — POTASSIUM PHOSPHATE, MONOBASIC POTASSIUM PHOSPHATE, DIBASIC 236; 224 MG/ML; MG/ML
30 INJECTION, SOLUTION INTRAVENOUS ONCE
Refills: 0 | Status: DISCONTINUED | OUTPATIENT
Start: 2022-12-29 | End: 2022-12-30

## 2022-12-29 RX ORDER — DEXTROSE 50 % IN WATER 50 %
50 SYRINGE (ML) INTRAVENOUS ONCE
Refills: 0 | Status: COMPLETED | OUTPATIENT
Start: 2022-12-29 | End: 2022-12-29

## 2022-12-29 RX ORDER — INSULIN LISPRO 100/ML
VIAL (ML) SUBCUTANEOUS EVERY 6 HOURS
Refills: 0 | Status: DISCONTINUED | OUTPATIENT
Start: 2022-12-29 | End: 2022-12-30

## 2022-12-29 RX ORDER — SODIUM CHLORIDE 9 MG/ML
1000 INJECTION INTRAMUSCULAR; INTRAVENOUS; SUBCUTANEOUS ONCE
Refills: 0 | Status: COMPLETED | OUTPATIENT
Start: 2022-12-29 | End: 2022-12-29

## 2022-12-29 RX ORDER — BRIVARACETAM 25 MG/1
50 TABLET, FILM COATED ORAL
Refills: 0 | Status: DISCONTINUED | OUTPATIENT
Start: 2022-12-29 | End: 2023-01-04

## 2022-12-29 RX ORDER — LATANOPROST 0.05 MG/ML
1 SOLUTION/ DROPS OPHTHALMIC; TOPICAL AT BEDTIME
Refills: 0 | Status: DISCONTINUED | OUTPATIENT
Start: 2022-12-29 | End: 2023-01-04

## 2022-12-29 RX ORDER — LEVETIRACETAM 250 MG/1
500 TABLET, FILM COATED ORAL EVERY 12 HOURS
Refills: 0 | Status: ACTIVE | OUTPATIENT
Start: 2022-12-29 | End: 2023-11-27

## 2022-12-29 RX ORDER — CHLORHEXIDINE GLUCONATE 213 G/1000ML
1 SOLUTION TOPICAL
Refills: 0 | Status: DISCONTINUED | OUTPATIENT
Start: 2022-12-29 | End: 2023-01-01

## 2022-12-29 RX ORDER — SODIUM CHLORIDE 9 MG/ML
500 INJECTION INTRAMUSCULAR; INTRAVENOUS; SUBCUTANEOUS ONCE
Refills: 0 | Status: COMPLETED | OUTPATIENT
Start: 2022-12-29 | End: 2022-12-29

## 2022-12-29 RX ORDER — POLYETHYLENE GLYCOL 3350 17 G/17G
17 POWDER, FOR SOLUTION ORAL DAILY
Refills: 0 | Status: DISCONTINUED | OUTPATIENT
Start: 2022-12-29 | End: 2023-01-01

## 2022-12-29 RX ORDER — AMLODIPINE BESYLATE 2.5 MG/1
10 TABLET ORAL DAILY
Refills: 0 | Status: DISCONTINUED | OUTPATIENT
Start: 2022-12-29 | End: 2023-01-03

## 2022-12-29 RX ORDER — ATORVASTATIN CALCIUM 80 MG/1
20 TABLET, FILM COATED ORAL AT BEDTIME
Refills: 0 | Status: DISCONTINUED | OUTPATIENT
Start: 2022-12-29 | End: 2023-01-04

## 2022-12-29 RX ORDER — POTASSIUM CHLORIDE 20 MEQ
10 PACKET (EA) ORAL
Refills: 0 | Status: COMPLETED | OUTPATIENT
Start: 2022-12-29 | End: 2022-12-29

## 2022-12-29 RX ORDER — POTASSIUM CHLORIDE 20 MEQ
10 PACKET (EA) ORAL
Refills: 0 | Status: COMPLETED | OUTPATIENT
Start: 2022-12-29 | End: 2022-12-30

## 2022-12-29 RX ORDER — SODIUM CHLORIDE 9 MG/ML
1000 INJECTION INTRAMUSCULAR; INTRAVENOUS; SUBCUTANEOUS
Refills: 0 | Status: DISCONTINUED | OUTPATIENT
Start: 2022-12-29 | End: 2022-12-30

## 2022-12-29 RX ORDER — AMLODIPINE BESYLATE 2.5 MG/1
10 TABLET ORAL DAILY
Refills: 0 | Status: DISCONTINUED | OUTPATIENT
Start: 2022-12-29 | End: 2022-12-29

## 2022-12-29 RX ORDER — ACETAMINOPHEN 500 MG
650 TABLET ORAL EVERY 6 HOURS
Refills: 0 | Status: DISCONTINUED | OUTPATIENT
Start: 2022-12-29 | End: 2023-01-04

## 2022-12-29 RX ORDER — SODIUM CHLORIDE 9 MG/ML
1000 INJECTION, SOLUTION INTRAVENOUS ONCE
Refills: 0 | Status: COMPLETED | OUTPATIENT
Start: 2022-12-29 | End: 2022-12-29

## 2022-12-29 RX ADMIN — SODIUM CHLORIDE 2000 MILLILITER(S): 9 INJECTION INTRAMUSCULAR; INTRAVENOUS; SUBCUTANEOUS at 02:30

## 2022-12-29 RX ADMIN — Medication 50 MILLILITER(S): at 23:20

## 2022-12-29 RX ADMIN — SODIUM CHLORIDE 100 MILLILITER(S): 9 INJECTION INTRAMUSCULAR; INTRAVENOUS; SUBCUTANEOUS at 22:04

## 2022-12-29 RX ADMIN — Medication 100 MILLIEQUIVALENT(S): at 04:31

## 2022-12-29 RX ADMIN — LEVETIRACETAM 400 MILLIGRAM(S): 250 TABLET, FILM COATED ORAL at 17:41

## 2022-12-29 RX ADMIN — SODIUM CHLORIDE 100 MILLILITER(S): 9 INJECTION INTRAMUSCULAR; INTRAVENOUS; SUBCUTANEOUS at 02:01

## 2022-12-29 RX ADMIN — CHLORHEXIDINE GLUCONATE 1 APPLICATION(S): 213 SOLUTION TOPICAL at 22:04

## 2022-12-29 RX ADMIN — Medication 100 MILLIEQUIVALENT(S): at 23:42

## 2022-12-29 RX ADMIN — Medication 100 MILLIEQUIVALENT(S): at 05:31

## 2022-12-29 RX ADMIN — Medication 50 MILLILITER(S): at 13:31

## 2022-12-29 RX ADMIN — SODIUM CHLORIDE 100 MILLILITER(S): 9 INJECTION INTRAMUSCULAR; INTRAVENOUS; SUBCUTANEOUS at 07:10

## 2022-12-29 RX ADMIN — Medication 100 MILLIEQUIVALENT(S): at 08:44

## 2022-12-29 RX ADMIN — SODIUM CHLORIDE 1000 MILLILITER(S): 9 INJECTION, SOLUTION INTRAVENOUS at 16:03

## 2022-12-29 RX ADMIN — SODIUM CHLORIDE 500 MILLILITER(S): 9 INJECTION INTRAMUSCULAR; INTRAVENOUS; SUBCUTANEOUS at 14:06

## 2022-12-29 RX ADMIN — LEVETIRACETAM 400 MILLIGRAM(S): 250 TABLET, FILM COATED ORAL at 06:31

## 2022-12-29 RX ADMIN — Medication 100 MILLIEQUIVALENT(S): at 22:26

## 2022-12-29 RX ADMIN — SODIUM CHLORIDE 100 MILLILITER(S): 9 INJECTION INTRAMUSCULAR; INTRAVENOUS; SUBCUTANEOUS at 17:42

## 2022-12-29 NOTE — PROGRESS NOTE ADULT - ASSESSMENT
ASSESSMENT:   82F on ASA (last took 1-2d ago), hx HTN, HLD, glaucoma. Fall yest evening or this AM (pt doesn’t remember). CTH: largely chronic L SDH (approx 2cm) w/ 8mm MLS. Exam: AOx2, PERRL, EOMI, no facial, slight R drift, LOWERY at least 4+/5.      NEURO:  [id number]  CT Head overnight  preop for OR brandon vs friday  keppra sz ppx  check aru 574  Activity: [] OOB as tolerated [x] Bedrest [] PT [] OT [] PMNR    PULM: RA  Incentive spirometry, mobilize as tolerated    CV: cardio to follow  ecg nsr, nml qtc  trend trop  tte, syncope workup  amlodipine w holding parameters  Keep -160mmHg    RENAL: very high CK, poss rhabdo, trend ck q12  UA  IVF 100cc until good PO intake  bladder scan/straight q6h    GI: lfts  Diet: keep npo  GI prophylaxis   Bowel regimen standing    ENDO: a1c  tsh  Goal euglycemia (-180)    HEME/ONC:LED  VTE prophylaxis: [x] SCDs     ID: vanc/zosyn given in ED for hypotension, blood cultures sent  monitor off abx      MISC:    SOCIAL/FAMILY:  [] awaiting [x] updated at bedside [] family meeting    CODE STATUS:  [x] Full Code [] DNR [] DNI [] Palliative/Comfort Care    DISPOSITION:  [x] ICU [] Stroke Unit [] Floor [] EMU [] RCU [] PCU    Contact: [phone] ASSESSMENT:   82F on ASA (last took 1-2d ago), hx HTN, HLD, glaucoma. Fall yest evening or this AM (pt doesn’t remember). CTH: largely chronic L SDH (approx 2cm) w/ 8mm MLS. Exam: AOx2, PERRL, EOMI, no facial, slight R drift, LOWERY at least 4+/5.      NEURO:  [id number]  interval CTH overnight stable  preop for OR today vs friday  keppra sz ppx  pain control  bedrest    PULM: RA  Incentive spirometry  mobilize as tolerated    CV:   -160mmHg  cardio to follow  ecg nsr, nml qtc  tte, syncope workup  amlodipine w holding parameters      RENAL:   Fluids: 100cc/h NS while NPO  bladder scan/straight q6h  trend CK    GI:   Diet: keep npo  GI prophylaxis   Bowel regimen standing  transaminitis, RUQ US when able    ENDO:   a1c  tsh  Goal euglycemia (-180)    HEME/ONC:  LED  SCDs     ID:   vanc/zosyn given in ED for hypotension, blood cultures sent  monitor off abx     chronic L SDH w/ 8mm MLS      NEURO:  [id number]  interval CTH overnight stable  preop for OR today vs friday  keppra sz ppx  pain control  bedrest    PULM: RA  Incentive spirometry  mobilize as tolerated    CV:   -160mmHg  cardio to follow  ecg nsr, nml qtc  tte, syncope workup  amlodipine w holding parameters  trend trop    RENAL:   Fluids: 100cc/h NS while NPO  bladder scan/straight q6h  trend CK    GI:   Diet: keep npo  GI prophylaxis   Bowel regimen standing  transaminitis, RUQ US when able    ENDO:   a1c  tsh  Goal euglycemia (-180)    HEME/ONC:  LED  SCDs     ID:   vanc/zosyn given in ED for hypotension, blood cultures sent  monitor off abx     chronic L SDH w/ 8mm MLS      NEURO:  [id number]  interval CTH overnight stable  preop for OR today   keppra sz ppx  pain control  bedrest    PULM:   RA  Incentive spirometry  mobilize as tolerated    CV:   SBP 90-160mmHg  cardio to follow  ecg nsr, nml qtc  tte, syncope workup  amlodipine w holding parameters  trop trend 340>320>>    RENAL:   Fluids: 100cc/h NS while NPO  bladder scan/straight q6h  trend CK    GI:   Diet: keep npo  GI prophylaxis na  Bowel regimen standing  transaminitis, RUQ US when able    ENDO:   a1c  tsh  Goal euglycemia (-180)    HEME/ONC:  LED  SCDs   hold chemoppx for or      ID:   vanc/zosyn given in ED for hypotension, blood cultures sent  monitor off abx    DISPO: ICU for OR chronic L SDH w/ 8mm MLS      NEURO:  [id number]  interval CTH overnight stable  preop for OR today   keppra sz ppx  pain control  bedrest    PULM:   RA  Incentive spirometry  mobilize as tolerated    CV:   SBP 90-160mmHg  cardio to follow  ecg nsr, nml qtc  tte, syncope workup  amlodipine w holding parameters  trop trend 340>320>>    RENAL:   Fluids: 100cc/h NS while NPO  bladder scan/straight q6h  trend CK  orona for accurate uop; oliguric    GI:   Diet: keep npo  GI prophylaxis na  Bowel regimen standing  transaminitis, RUQ US when able    ENDO:   a1c  tsh  Goal euglycemia (-180)    HEME/ONC:  LED  SCDs   hold chemoppx for or      ID:   vanc/zosyn given in ED for hypotension, blood cultures sent  monitor off abx    DISPO: ICU for OR chronic L SDH w/ 8mm MLS    NEURO:  [id number]  interval CTH overnight stable  preop for OR today   keppra sz ppx  pain control  bedrest    PULM:   RA  Incentive spirometry  mobilize as tolerated    CV:   SBP 90-160mmHg  cardio to follow  ecg nsr, nml qtc  tte, syncope workup  amlodipine w holding parameters  trop trend 340>320>>    RENAL:   Fluids: 100cc/h NS while NPO  bladder scan/straight q6h  trend CK  orona for accurate uop; oliguric    GI:   Diet: keep npo  GI prophylaxis na  Bowel regimen standing  transaminitis, RUQ US when able    ENDO:   a1c  tsh  Goal euglycemia (-180)    HEME/ONC:  LED  SCDs   hold chemoppx for or      ID:   vanc/zosyn given in ED for hypotension, blood cultures sent  monitor off abx    at risk for deteriorations\ due to SDH, midline shift, brain compression   DISPO: ICU for OR

## 2022-12-29 NOTE — PROGRESS NOTE ADULT - SUBJECTIVE AND OBJECTIVE BOX
NSCU Progress Note    Assessment/Hospital Course:     82F on ASA (last took 1-2d ago), hx HTN, HLD, glaucoma. Lives alone, fully independent. Found down per daughter and family 3 pm 12/28, LKN 12/27 7 pm. Unknown mechanism of fall. Family found pt next to bed, some stool found in kitchen and toilet. Presented w hypotension to ED.  CTH: largely chronic L SDH (approx 2cm) w/ 8mm MLS.    24 Hour Events/Subjective:  - Admitted overnight      REVIEW OF SYSTEMS:  - negative except as above    VITALS:   - Reviewed      IMAGING/DATA:   - Reviewed          PHYSICAL EXAM:    General: calm  CVS: RRR  Pulm: CTAB  GI: Soft, NTND  Extremities: No LE Edema  Neuro: Awake, alert, oriented to self and place w choices, follows commands, PERRL, EOMI, face symmetric, tongue midline, R side w drift hits bed, full strength on LUE, LLE AG throughout, R side 4+/5   NSCU Progress Note    Assessment/Hospital Course:     82F on ASA (last took 1-2d ago), hx HTN, HLD, glaucoma. Lives alone, fully independent. Found down per daughter and family 3 pm 12/28, LKN 12/27 7 pm. Unknown mechanism of fall. Family found pt next to bed, some stool found in kitchen and toilet. Presented w hypotension to ED.  CTH: largely chronic L SDH (approx 2cm) w/ 8mm MLS.    24 Hour Events/Subjective:  - Admitted overnight  - CK >13K      REVIEW OF SYSTEMS:  - negative except as above    VITALS:   - Reviewed      IMAGING/DATA:   - Reviewed          PHYSICAL EXAM:    General: calm  CVS: RRR  Pulm: CTAB  GI: Soft, NTND  Extremities: No LE Edema  Neuro: Awake, alert, oriented to self and place w choices, follows commands, PERRL, EOMI, face symmetric, tongue midline, R side w drift hits bed, full strength on LUE, LLE AG throughout, R side 4+/5   NSCU Progress Note    Assessment/Hospital Course:    82F on ASA, hx HTN, HLD, glaucoma, fully independent, who was found down for unknown duration LKW 12/27 PM, found to have soiled herself, presented w hypotension to ED,  CTH: largely chronic L SDH (approx 2cm) w/ 8mm MLS, admitted to NSICU for ongoing management.     24 Hour Events/Subjective:  - Admitted overnight  - CK >13K, trending        REVIEW OF SYSTEMS:  - negative except as above    VITALS:   - Reviewed      IMAGING/DATA:   - Reviewed          PHYSICAL EXAM:    General: calm  CVS: RRR  Pulm: CTAB  GI: Soft, NTND  Extremities: No LE Edema  Neuro: Awake, alert, oriented to self and place w choices, follows commands, PERRL, EOMI, face symmetric, tongue midline, R side w drift hits bed, full strength on LUE, LLE AG throughout, R side 4+/5   NSCU Progress Note    Assessment/Hospital Course:    82F on ASA, hx HTN, HLD, glaucoma, fully independent, who was found down for unknown duration LKW 12/27 PM, found to have soiled herself, presented w hypotension to ED,  CTH: largely chronic L SDH (approx 2cm) w/ 8mm MLS, admitted to NSICU for ongoing management.     24 Hour Events/Subjective:  - Admitted overnight  - CK >13K, trending        REVIEW OF SYSTEMS:  - negative except as above    VITALS:   - Reviewed      IMAGING/DATA:   - Reviewed          PHYSICAL EXAM:    General: calm  CVS: RRR  Pulm: CTAB  GI: Soft, NTND  Extremities: No LE Edema  Neuro: lethargic, AOx1 (name, [place], unsure of year), no facial, 3mm reative b/l, KHRIS, rarely follows commands on uppers, LUE AG, RUE barely AG distally, LLE 2/5, RLE WD NSCU Progress Note    Assessment/Hospital Course:    82F on ASA, hx HTN, HLD, glaucoma, fully independent, who was found down for unknown duration LKW 12/27 PM, found to have soiled herself, presented w hypotension to ED,  CTH: largely chronic L SDH (approx 2cm) w/ 8mm MLS, admitted to NSICU for ongoing management.     24 Hour Events/Subjective:  - Admitted overnight  - CK >13K, trending down     REVIEW OF SYSTEMS: [x] Unable to Assess due to neurologic exam   [ ] All ROS addressed below are non-contributory, except:  Neuro: [ ] Headache [ ] Back pain [ ] Numbness [ ] Weakness [ ] Ataxia [ ] Dizziness [ ] Aphasia [ ] Dysarthria [ ] Visual disturbance  Resp: [ ] Shortness of breath/dyspnea [ ] Orthopnea [ ] Cough  CV: [ ] Chest pain [ ] Palpitation [ ] Lightheadedness [ ] Syncope  Renal: [ ] Thirst [ ] Edema  GI: [ ] Nausea [ ] Emesis [ ] Abdominal pain [ ] Constipation [ ] Diarrhea  Hem: [ ] Hematemesis [ ] bBright red blood per rectum  ID: [ ] Fever [ ] Chills [ ] Dysuria  ENT: [ ] Rhinorrhea    VITALS:   - Reviewed    IMAGING/DATA:   - Reviewed    PHYSICAL EXAM:    General: calm  CVS: RRR  Pulm: CTAB  GI: Soft, NTND  Extremities: No LE Edema  Neuro: lethargic, AOx1 (name, [place], unsure of year), no facial, 3mm reative b/l, KHRIS, rarely follows commands on uppers, LUE AG, RUE barely AG distally, LLE 2/5, RLE WD

## 2022-12-29 NOTE — PROGRESS NOTE ADULT - ASSESSMENT
chronic L SDH w/ 8mm MLS s/p L francis hole    NEURO:  POD 0 from L francis hole  [id number]  CTH AM   D/C Keppra, switch to Briviact due to thrombocytopenia   Pain control, tylenol as needed   Bedrest  1 NANDO drain to full suction     PULM:   RA  Incentive spirometry  mobilize as tolerated    CV:   SBP 90-160mmHg  Cards following  ECG NSR   TTE, EF 61%, syncope workup  Amlodipine w holding parameters  Trend [place]    RENAL:   Fluids: NS @ 100cc/hr  bladder scan/straight q6h  trend CK  orona for accurate uop; oliguric    GI:   Diet: NPO, S&S pending   GI prophylaxis not indicated   Bowel regimen standing, senna, miralax   transaminitis, RUQ US when able    ENDO:   HgA1C 6.1  TSH 2.48   Goal euglycemia (-180)  ISS    HEME/ONC:  LED  SCDs   Hold chemoppx for fresh post op   Monitor thrombocytopenia, repeat CBC in AM     ID:   Vanc/Zosyn given in ED for hypotension, blood cultures sent  Monitor off abx  afebrile     at risk for deteriorations due to SDH, midline shift, brain compression   DISPO: ICU       45 minutes of critical care time spent with patient

## 2022-12-29 NOTE — PROGRESS NOTE ADULT - SUBJECTIVE AND OBJECTIVE BOX
NSCU Progress Note    Assessment/Hospital Course:    82F on ASA, hx HTN, HLD, glaucoma, fully independent, who was found down for unknown duration LKW 12/27 PM, found to have soiled herself, presented w hypotension to ED,  CTH: largely chronic L SDH (approx 2cm) w/ 8mm MLS, admitted to NSCU for ongoing management.     24 Hour Events/Subjective:  - Admitted overnight  - CK >13K, trending down   - s/p L francis hole     REVIEW OF SYSTEMS: [x] Unable to Assess due to neurologic exam   [ ] All ROS addressed below are non-contributory, except:  Neuro: [ ] Headache [ ] Back pain [ ] Numbness [ ] Weakness [ ] Ataxia [ ] Dizziness [ ] Aphasia [ ] Dysarthria [ ] Visual disturbance  Resp: [ ] Shortness of breath/dyspnea [ ] Orthopnea [ ] Cough  CV: [ ] Chest pain [ ] Palpitation [ ] Lightheadedness [ ] Syncope  Renal: [ ] Thirst [ ] Edema  GI: [ ] Nausea [ ] Emesis [ ] Abdominal pain [ ] Constipation [ ] Diarrhea  Hem: [ ] Hematemesis [ ] bBright red blood per rectum  ID: [ ] Fever [ ] Chills [ ] Dysuria  ENT: [ ] Rhinorrhea    VITALS:   - Reviewed    IMAGING/DATA:   - Reviewed    PHYSICAL EXAM:    General: calm  CVS: RRR  Pulm: CTAB  GI: Soft, NTND  Extremities: No LE Edema  Neuro: Awake, Ox3, no facial, 3mm reactive b/l, follows commands, R side 5/5 w/ intermittent drift, L side 5/5

## 2022-12-29 NOTE — PRE-ANESTHESIA EVALUATION ADULT - NSANTHOSAYNRD_GEN_A_CORE
No. RANDALL screening performed.  STOP BANG Legend: 0-2 = LOW Risk; 3-4 = INTERMEDIATE Risk; 5-8 = HIGH Risk

## 2022-12-29 NOTE — CHART NOTE - NSCHARTNOTEFT_GEN_A_CORE
CAPRINI SCORE [CLOT] Score on Admission for     AGE RELATED RISK FACTORS                                                       MOBILITY RELATED FACTORS  [ ] Age 41-60 years                                            (1 Point)                  [ ] Bed rest                                                        (1 Point)  [ ] Age 61-74 years                                           (2 Points)                 [ ] Plaster cast                                                   (2 Points)  [x] Age > 75 years                                              (3 Points)                 [ ] Bed bound for more than 72 hours         (2 Points)    DISEASE RELATED RISK FACTORS                                               GENDER SPECIFIC FACTORS  [ ] Edema in the lower extremities                       (1 Point)           [ ] Pregnancy                                                          (1 Point)  [ ] Varicose veins                                               (1 Point)                  [ ] Post-partum < 6 weeks                                   (1 Point)             [ ] BMI > 25 Kg/m2                                            (1 Point)                  [ ] Hormonal therapy  or oral contraception   (1 Point)                 [ ] Sepsis (in the previous month)                        (1 Point)            [ ] History of pregnancy complications             (1 point)  [ ] Pneumonia or serious lung disease                                            [ ] Unexplained or recurrent               (1 Point)           (in the previous month)                               (1 Point)  [ ] Abnormal pulmonary function test                     (1 Point)                 SURGERY RELATED RISK FACTORS (include planned surgeries)  [ ] Acute myocardial infarction                              (1 Point)                 [ ]  Section                                             (1 Point)  [ ] Congestive heart failure (in the previous month)  (1 Point)        [ ] Minor surgery                                                  (1 Point)   [ ] Inflammatory bowel disease                             (1 Point)                 [ ] Arthroscopic surgery                                        (2 Points)  [ ] Central venous access                                      (2 Points)  [ ] History / presence of malignancy                   (2 points)   [ ] General surgery lasting more than 45 minutes   (2 Points)       [ ] Stroke (in the previous month)                          (5 Points)               [ ] Elective arthroplasty                                         (5 Points)                                                                                                                                               HEMATOLOGY RELATED FACTORS                                                 TRAUMA RELATED RISK FACTORS  [ ] Prior episodes of VTE                                     (3 Points)                [ ] Fracture of the hip, pelvis, or leg                       (5 Points)  [ ] Positive family history for VTE                         (3 Points)             [ ] Acute spinal cord injury (in the previous month)  (5 Points)  [ ] Prothrombin 01044 A                                     (3 Points)                [ ] Paralysis  (less than 1 month)                             (5 Points)  [ ] Factor V Leiden                                             (3 Points)                   [ ] Multiple Trauma within 1 month                        (5 Points)  [ ] Lupus anticoagulants                                     (3 Points)                                                           [ ] Anticardiolipin antibodies                               (3 Points)                                                       [ ] High homocysteine in the blood                      (3 Points)                                             [ ] Other congenital or acquired thrombophilia      (3 Points)                                                [ ] Heparin induced thrombocytopenia                  (3 Points)                                          Total Score [3]    Risk:  Very low 0   Low 1 to 2   Moderate 3 to 4   High =5       VTE Prophylaxis Recommendations:  [x] mechanical pneumatic compression devices                                      [ ] contraindicated: _____________________  [ ] chemoprophylaxis                                                                                    [ ] contraindicated: _____________________    **** HIGH LIKELIHOOD DVT PRESENT ON ADMISSION  [ ] (please order LE dopplers within 24 hours of admission)

## 2022-12-29 NOTE — PRE-ANESTHESIA EVALUATION ADULT - NSRADCARDRESULTSFT_GEN_ALL_CORE
< from: TTE with Doppler (w/Cont) (12.29.22 @ 08:32) >    Dimensions:    Normal Values:  LA:     1.8    2.0 - 4.0 cm  Ao:     2.8    2.0 - 3.8 cm  SEPTUM: 1.3    0.6 - 1.2 cm  PWT:    1.3    0.6 - 1.1 cm  LVIDd:2.8    3.0 - 5.6 cm  LVIDs:  0.8    1.8 - 4.0 cm  Derived variables:  LVMI: 66 g/m2  RWT: 0.92  Fractional short: 71 %  EF (Canales Rule): 61 %Doppler Peak Velocity (m/sec):  AoV=1.8  ------------------------------------------------------------------------  Observations:  Mitral Valve: Normal mitral valve. No mitral regurgitation  seen.  Aortic Valve/Aorta: Calcified trileaflet aortic valve with  decreased opening. Peak transaortic valve gradient equals  13 mm Hg, consistent with mild aortic stenosis. No aortic  valve regurgitation seen. Peak left ventricular outflow  tract gradient equals 3 mm Hg.  Aortic Root: 2.8 cm.  LVOT diameter: 2 cm.  Left Atrium: Left atrium not well visualized.  Left Ventricle: Endocardial visualization enhanced with  intravenous injection of Ultrasonic Enhancing Agent  (Definity). Hyperdynamic left ventricular systolic  function. LVEF calculated using biplane Canales's method is  61%. Increased relative wall thickness with normal left  ventricular mass index, consistent with concentric left  ventricular remodeling. Normal diastolic function.  Right Heart: Normal right atrium. Normal right ventricular  size and function. Normal tricuspid valve. No tricuspid  regurgitation. Normal pulmonic valve.  Pericardium/Pleura: No pericardial effusion seen.  Hemodynamic: Estimated right ventricular systolic pressure  equals 28 mm Hg, assuming right atrial pressure equals 3 mm  Hg, consistent with normal pulmonary pressures.  ------------------------------------------------------------------------  Conclusions:  1. Left atrium not well visualized.Increased relative wall  thickness with normal left ventricular mass index,  consistent with concentric left ventricular remodeling.  Endocardial visualization enhanced with intravenous  injection of Ultrasonic Enhancing Agent (Definity).  Hyperdynamic left ventricular systolic function. LVEF  calculated using biplane Canales's method is 61%.Normal  diastolic function.  2. Normal right atrium. Normal right ventricular size and  function.  3. Normal tricuspid valve. No tricuspid  regurgitation.Estimated pulmonary artery systolic pressure  equals 28 mm Hg, assuming right atrial pressure equals 3  mm Hg, consistent with normal pulmonary pressures.  4. No pericardialeffusion seen.  *** Compared with echocardiogram of 3/30/2022, no  significant changes noted.  ------------------------------------------------------------------------  Confirmed on  12/29/2022 - 13:07:49 by Patsy Tavares M.D.  ------------------------------------------------------------------------

## 2022-12-29 NOTE — PROGRESS NOTE ADULT - SUBJECTIVE AND OBJECTIVE BOX
SUMMARY:    HOSPITAL COURSE:    24 HOUR EVENTS:     ADMISSION SCORES:   GCS: HH: MF: NIHSS: ICH Score:    SEDATION SCORES:  RASS: CAM-ICU:     REVIEW OF SYSTEMS:    ALLERGIES: Allergies    No Known Allergies    Intolerances        VITALS/DATA/ORDERS: [] Reviewed    DEVICES:   [] Restraints [] PIVs [] ET tube [] central line [] PICC [] arterial line [] orona [] NGT/OGT [] EVD [] LD [] NANDO/HMV [] LiCOX [] ICP monitor [] Trach [] PEG [] Chest Tube [] other:    EXAMINATION:  General: No acute distress  HEENT: Anicteric sclerae  Cardiac: [id number]  Lungs: Clear  Abdomen: Soft, non-tender, +BS  Extremities: No c/c/e  Skin/Incision Site: Clean, dry and intact  Neurologic: Awake, alert, fully oriented, follows commands, PERRL, VFFtc, EOMI, face symmetric, tongue midline, no drift, full strength SUMMARY: 82F on ASA (last took 1-2d ago), hx HTN, HLD, glaucoma. Lives alone, fully independent. Found down per daughter and family 3 pm 12/28, LKN 12/27 7 pm. Unknown mechanism of fall. Family found pt next to bed, some stool found in kitchen and toilet. Presented w hypotension to ED.  CTH: largely chronic L SDH (approx 2cm) w/ 8mm MLS. Exam: AOx2, PERRL, EOMI, no facial, slight R drift, LOWERY at least 4+/5.    HOSPITAL COURSE: given vanc zosyn in ED for hypotension and wbc    24 HOUR EVENTS: seen in NSCU, pt denies any pain    ADMISSION SCORES:   GCS: 15 NIHSS: 7    REVIEW OF SYSTEMS: as above    ALLERGIES: Allergies    No Known Allergies    Intolerances        VITALS/DATA/ORDERS: [x] Reviewed    DEVICES:   [] Restraints [x] PIVs [] ET tube [] central line [] PICC [] arterial line [] orona [] NGT/OGT [] EVD [] LD [] NANDO/HMV [] LiCOX [] ICP monitor [] Trach [] PEG [] Chest Tube [] other:    EXAMINATION:  General: No acute distress  HEENT: Anicteric sclerae  Cardiac: [id number]  Lungs: Clear  Abdomen: Soft, non-tender, +BS  Extremities: No c/c/e  Skin/Incision Site: Clean, dry and intact  Neurologic: Awake, alert, oriented to self and place w choices, follows commands, PERRL, EOMI, face symmetric, tongue midline, R side w drift hits bed, full strength on LUE, LLE AG throughout, R side 4+/5

## 2022-12-29 NOTE — CONSULT NOTE ADULT - SUBJECTIVE AND OBJECTIVE BOX
DATE OF SERVICE: 12-29-22 @ 09:45    CHIEF COMPLAINT:Patient is a 82y old  Female who presents with a chief complaint of SDH (29 Dec 2022 06:00)      HISTORY OF PRESENT ILLNESS:HPI:  82F on ASA (last took 1-2d ago), hx HTN, HLD, glaucoma. Fall yest evening or this AM (pt doesn’t remember). CTH: largely chronic L SDH (approx 2cm) w/ 8mm MLS. Exam: AOx2, PERRL, EOMI, no facial, slight R drift, LOWERY at least 4+/5.    ICU Vital Signs Last 24 Hrs  T(C): 36.1 (28 Dec 2022 19:28), Max: 36.4 (28 Dec 2022 19:02)  T(F): 96.9 (28 Dec 2022 19:28), Max: 97.5 (28 Dec 2022 19:02)  HR: 82 (28 Dec 2022 19:28) (70 - 82)  BP: 94/61 (28 Dec 2022 19:28) (92/63 - 94/61)  BP(mean): --  ABP: --  ABP(mean): --  RR: 14 (28 Dec 2022 19:28) (14 - 20)  SpO2: 95% (28 Dec 2022 19:28) (95% - 99%)    O2 Parameters below as of 28 Dec 2022 19:28  Patient On (Oxygen Delivery Method): room air                              16.0   15.24 )-----------( 216      ( 28 Dec 2022 19:41 )             50.1   12-28    139  |  99  |  27<H>  ----------------------------<  140<H>  4.7   |  22  |  0.77    Ca    9.6      28 Dec 2022 19:41  Phos  4.3     12-28  Mg     2.2     12-28    TPro  7.7  /  Alb  3.9  /  TBili  0.8  /  DBili  x   /  AST  426<H>  /  ALT  112<H>  /  AlkPhos  111  12-28   (28 Dec 2022 22:52)      PAST MEDICAL & SURGICAL HISTORY:  HLD (hyperlipidemia)      HTN (hypertension)      S/P hysterectomy      Benign teratoma              MEDICATIONS:  amLODIPine   Tablet 10 milliGRAM(s) Oral daily        acetaminophen     Tablet .. 650 milliGRAM(s) Oral every 6 hours PRN  levETIRAcetam  IVPB 500 milliGRAM(s) IV Intermittent every 12 hours  ondansetron Injectable 4 milliGRAM(s) IV Push every 6 hours PRN    polyethylene glycol 3350 17 Gram(s) Oral daily  senna 2 Tablet(s) Oral at bedtime    atorvastatin 20 milliGRAM(s) Oral at bedtime    chlorhexidine 4% Liquid 1 Application(s) Topical <User Schedule>  potassium chloride  10 mEq/100 mL IVPB 10 milliEquivalent(s) IV Intermittent every 1 hour  sodium chloride 0.9%. 1000 milliLiter(s) IV Continuous <Continuous>      FAMILY HISTORY:  No pertinent family history in first degree relatives        Non-contributory    SOCIAL HISTORY:    [ ] Tobacco  [ ] Drugs  [ ] Alcohol    Allergies    hydrochlorothiazide (Unknown)    Intolerances    	    REVIEW OF SYSTEMS:  CONSTITUTIONAL: No fever  EYES: No eye pain, visual disturbances, or discharge  ENMT:  No difficulty hearing, tinnitus  NECK: No pain or stiffness  RESPIRATORY: No cough, wheezing,  CARDIOVASCULAR: No chest pain, palpitations, passing out, dizziness, or leg swelling  GASTROINTESTINAL:  No nausea, vomiting, diarrhea or constipation. No melena.  GENITOURINARY: No dysuria, hematuria  NEUROLOGICAL: No stroke like symptoms  SKIN: No burning or lesions   ENDOCRINE: No heat or cold intolerance  MUSCULOSKELETAL: No joint pain or swelling  PSYCHIATRIC: No  anxiety, mood swings  HEME/LYMPH: No bleeding gums  ALLERGY AND IMMUNOLOGIC: No hives or eczema	    All other ROS negative    PHYSICAL EXAM:  T(C): 36.4 (12-29-22 @ 08:00), Max: 36.8 (12-29-22 @ 00:55)  HR: 58 (12-29-22 @ 09:30) (54 - 85)  BP: 100/58 (12-29-22 @ 09:30) (82/60 - 126/49)  RR: 20 (12-29-22 @ 09:30) (10 - 20)  SpO2: 99% (12-29-22 @ 09:30) (95% - 100%)  Wt(kg): --  I&O's Summary    28 Dec 2022 07:01  -  29 Dec 2022 07:00  --------------------------------------------------------  IN: 1800 mL / OUT: 650 mL / NET: 1150 mL        Appearance: Normal	  HEENT:   Normal oral mucosa, EOMI	  Cardiovascular:  S1 S2, No JVD,    Respiratory: Lungs clear to auscultation	  Psychiatry: Alert  Gastrointestinal:  Soft, Non-tender, + BS	  Skin: No rashes   Neurologic: Non-focal  Extremities:  No edema  Vascular: Peripheral pulses palpable    	    	  	  CARDIAC MARKERS:  Labs personally reviewed by me                                  13.8   12.25 )-----------( 205      ( 29 Dec 2022 03:00 )             43.6     12-29    140  |  103  |  30<H>  ----------------------------<  135<H>  3.7   |  25  |  0.91    Ca    8.8      29 Dec 2022 03:00  Phos  3.3     12-29  Mg     2.0     12-29    TPro  6.4  /  Alb  3.2<L>  /  TBili  0.9  /  DBili  x   /  AST  329<H>  /  ALT  93<H>  /  AlkPhos  86  12-29          EKG: Personally reviewed by me -   Radiology: Personally reviewed by me -       Assessment /Plan:       Differential diagnosis and plan of care discussed with patient after the evaluation. Counseling on diet, nutritional counseling, weight management, exercise and medication compliance was done.   Advanced care planning/advanced directives discussed with patient/family. DNR status including forceful chest compressions to attempt to restart the heart, ventilator support/artificial breathing, electric shock, artificial nutrition, health care proxy, Molst form all discussed with pt. Pt wishes to consider. More than fifteen minutes spent on discussing advanced directives.       Nathan Jones DO [facility]  Cardiovascular Medicine  [address], [address]  Available for call or text via [employer] TE[employer]  Office [phone]   DATE OF SERVICE: 12-29-22 @ 09:45    CHIEF COMPLAINT:Patient is a 82y old  Female who presents with a chief complaint of SDH (29 Dec 2022 06:00)      HISTORY OF PRESENT ILLNESS:HPI:  82F on ASA (last took 1-2d ago), hx HTN, HLD, glaucoma. Fall yest evening or this AM (pt doesn’t remember). CTH: largely chronic L SDH (approx 2cm) w/ 8mm MLS. Exam: AOx2, PERRL, EOMI, no facial, slight R drift, LOWERY at least 4+/5.                 PAST MEDICAL & SURGICAL HISTORY:  HLD (hyperlipidemia)      HTN (hypertension)      S/P hysterectomy      Benign teratoma              MEDICATIONS:  amLODIPine   Tablet 10 milliGRAM(s) Oral daily        acetaminophen     Tablet .. 650 milliGRAM(s) Oral every 6 hours PRN  levETIRAcetam  IVPB 500 milliGRAM(s) IV Intermittent every 12 hours  ondansetron Injectable 4 milliGRAM(s) IV Push every 6 hours PRN    polyethylene glycol 3350 17 Gram(s) Oral daily  senna 2 Tablet(s) Oral at bedtime    atorvastatin 20 milliGRAM(s) Oral at bedtime    chlorhexidine 4% Liquid 1 Application(s) Topical <User Schedule>  potassium chloride  10 mEq/100 mL IVPB 10 milliEquivalent(s) IV Intermittent every 1 hour  sodium chloride 0.9%. 1000 milliLiter(s) IV Continuous <Continuous>      FAMILY HISTORY:  No pertinent family history in first degree relatives        Non-contributory    SOCIAL HISTORY:    [ -] Tobacco  [ -] Drugs  [ -] Alcohol    Allergies    hydrochlorothiazide (Unknown)    Intolerances    	    REVIEW OF SYSTEMS:  CONSTITUTIONAL: No fever  EYES: No eye pain, visual disturbances, or discharge  ENMT:  No difficulty hearing, tinnitus  NECK: No pain or stiffness  RESPIRATORY: No cough, wheezing,  CARDIOVASCULAR: No chest pain, palpitations, passing out, dizziness, or leg swelling  GASTROINTESTINAL:  No nausea, vomiting, diarrhea or constipation. No melena.  GENITOURINARY: No dysuria, hematuria  NEUROLOGICAL: No stroke like symptoms  SKIN: No burning or lesions   ENDOCRINE: No heat or cold intolerance  MUSCULOSKELETAL: No joint pain or swelling  PSYCHIATRIC: No  anxiety, mood swings  HEME/LYMPH: No bleeding gums  ALLERGY AND IMMUNOLOGIC: No hives or eczema	    All other ROS negative    PHYSICAL EXAM:  T(C): 36.4 (12-29-22 @ 08:00), Max: 36.8 (12-29-22 @ 00:55)  HR: 58 (12-29-22 @ 09:30) (54 - 85)  BP: 100/58 (12-29-22 @ 09:30) (82/60 - 126/49)  RR: 20 (12-29-22 @ 09:30) (10 - 20)  SpO2: 99% (12-29-22 @ 09:30) (95% - 100%)  Wt(kg): --  I&O's Summary    28 Dec 2022 07:01  -  29 Dec 2022 07:00  --------------------------------------------------------  IN: 1800 mL / OUT: 650 mL / NET: 1150 mL        Appearance: Normal	  HEENT:   Normal oral mucosa, EOMI	  Cardiovascular:  S1 S2, No JVD,    Respiratory: Lungs clear to auscultation	  Psychiatry: Alert  Gastrointestinal:  Soft, Non-tender, + BS	  Skin: No rashes   Neurologic: Non-focal  Extremities:  No edema  Vascular: Peripheral pulses palpable    	    	  	  CARDIAC MARKERS:  Labs personally reviewed by me                                  13.8   12.25 )-----------( 205      ( 29 Dec 2022 03:00 )             43.6     12-29    140  |  103  |  30<H>  ----------------------------<  135<H>  3.7   |  25  |  0.91    Ca    8.8      29 Dec 2022 03:00  Phos  3.3     12-29  Mg     2.0     12-29    TPro  6.4  /  Alb  3.2<L>  /  TBili  0.9  /  DBili  x   /  AST  329<H>  /  ALT  93<H>  /  AlkPhos  86  12-29          EKG: Personally reviewed by me - SB   Radiology: Personally reviewed by me -   < from: Xray Chest 1 View- PORTABLE-Urgent (12.28.22 @ 19:42) >  Clear lungs.    < end of copied text >  < from: CT Head No Cont (12.29.22 @ 03:01) >  Grossly stable left holohemispheric subdural hematoma, with associated   mass effect and rightward midline shift. Stable ventricular size without   evidence of hydrocephalus, at this time.    < end of copied text >  < from: CT Cervical Spine No Cont (12.29.22 @ 03:00) >    No acute cervical spine fracture or evidence of genetic malalignment.    < end of copied text >  < from: TTE with Doppler (w/Cont) (12.29.22 @ 08:32) >  EF (Canales Rule): 61 %Doppler Peak Velocity (m/sec):  AoV=1.8  ------------------------------------------------------------------------  Observations:  Mitral Valve: Normal mitral valve. No mitral regurgitation  seen.  Aortic Valve/Aorta: Calcified trileaflet aortic valve with  decreased opening. Peak transaortic valve gradient equals  13 mm Hg, consistent with mild aortic stenosis. No aortic  valve regurgitation seen. Peak left ventricular outflow  tract gradient equals 3 mm Hg.  Aortic Root: 2.8 cm.  LVOT diameter: 2 cm.  Left Atrium: Left atrium not well visualized.  Left Ventricle: Endocardial visualization enhanced with  intravenous injection of Ultrasonic Enhancing Agent  (Definity). Hyperdynamic left ventricular systolic  function. LVEF calculated using biplane Canales's method is  61%. Increased relative wall thickness with normal left  ventricular mass index, consistent with concentric left  ventricular remodeling. Normal diastolic function.  Right Heart: Normal right atrium. Normal right ventricular  size and function. Normal tricuspid valve. No tricuspid  regurgitation. Normal pulmonic valve.  Pericardium/Pleura: No pericardial effusion seen.  Hemodynamic: Estimated right ventricular systolic pressure  equals 28 mm Hg, assuming right atrial pressure equals 3 mm  Hg, consistent with normal pulmonary pressures.  ------------------------------------------------------------------------  Conclusions:  1. Left atrium not well visualized.Increased relative wall  thickness with normal left ventricular mass index,  consistent with concentric left ventricular remodeling.  Endocardial visualization enhanced with intravenous  injection of Ultrasonic Enhancing Agent (Definity).  Hyperdynamic left ventricular systolic function. LVEF  calculated using biplane Canales's method is 61%.Normal  diastolic function.  2. Normal right atrium. Normal right ventricular size and  function.  3. Normal tricuspid valve. No tricuspid  regurgitation.Estimated pulmonary artery systolic pressure  equals 28 mm Hg, assuming right atrial pressure equals 3  mm Hg, consistent with normal pulmonary pressures.  4. No pericardialeffusion seen.  *** Compared with echocardiogram of 3/30/2022, no  significant changes noted.    < end of copied text >      Assessment /Plan:     Ms. Laya Arevalo is a 83 yo female with PMH of HTN, HLD and glaucoma who presents after fall with SDH now admitted for NSCU for further management. 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     Problem/Plan -1  Problem: Cardiac Risk Stratificatin  - ECG with no ischemia noted  - Patient not in decompendated HF  - TTE grossly unremarkable  - Trop likely elevated d/t acute rhabdo secondary to fall and unknown downtime  - No hx of severe MS/AS.  - No hx of tachy steven arrhythmia  - Patient is mod risk for mod risk non-elective neurosurgery. No contraindication to proceed.    Problem/Plan -2  Problem: Elevated Trop  - ECG with no ischemia noted  - TTE unremarkable with normal LV and no WMA  - Elevated trop likely elevated d/t acute rhabdo secondary to fall and unknown downtime    Problem/Plan -3  Problem: Acute Rhabdomyolysis  - Patient s/p fall with unknown downtime  - CK upon presentation 44479 now downtrending (7407)  - Cont to closely trend  - IVF as per NSICU    Problem/Plan -4  Problem:  Hypertension  - BP soft off home medication (takes amlodipine 10mg PO daily at home)    Problem/Plan -5  Problem: HLD  - resume Atorvastatin when able to tolerate PO    Differential diagnosis and plan of care discussed with patient after the evaluation. Counseling on diet, nutritional counseling, weight management, exercise and medication compliance was done.   Advanced care planning/advanced directives discussed with patient/family. DNR status including forceful chest compressions to attempt to restart the heart, ventilator support/artificial breathing, electric shock, artificial nutrition, health care proxy, Molst form all discussed with pt. Pt wishes to consider. More than fifteen minutes spent on discussing advanced directives.     Gemma Perez [facility]-BC  Nathan Jones DO [facility]  Cardiovascular Medicine  [address] [place] [address] Dr, [address]  Available for call or text via [employer] TE[employer]  Office [phone]   DATE OF SERVICE: 12-29-22 @ 09:45    CHIEF COMPLAINT:Patient is a 82y old  Female who presents with a chief complaint of SDH (29 Dec 2022 06:00)      HISTORY OF PRESENT ILLNESS:HPI:  82F on ASA (last took 1-2d ago), hx HTN, HLD, glaucoma. Fall yest evening or this AM (pt doesn’t remember). CTH: largely chronic L SDH (approx 2cm) w/ 8mm MLS. Exam: AOx2, PERRL, EOMI, no facial, slight R drift, LOWERY at least 4+/5.                 PAST MEDICAL & SURGICAL HISTORY:  HLD (hyperlipidemia)      HTN (hypertension)      S/P hysterectomy      Benign teratoma              MEDICATIONS:  amLODIPine   Tablet 10 milliGRAM(s) Oral daily        acetaminophen     Tablet .. 650 milliGRAM(s) Oral every 6 hours PRN  levETIRAcetam  IVPB 500 milliGRAM(s) IV Intermittent every 12 hours  ondansetron Injectable 4 milliGRAM(s) IV Push every 6 hours PRN    polyethylene glycol 3350 17 Gram(s) Oral daily  senna 2 Tablet(s) Oral at bedtime    atorvastatin 20 milliGRAM(s) Oral at bedtime    chlorhexidine 4% Liquid 1 Application(s) Topical <User Schedule>  potassium chloride  10 mEq/100 mL IVPB 10 milliEquivalent(s) IV Intermittent every 1 hour  sodium chloride 0.9%. 1000 milliLiter(s) IV Continuous <Continuous>      FAMILY HISTORY:  No pertinent family history in first degree relatives        Non-contributory    SOCIAL HISTORY:    [ -] Tobacco  [ -] Drugs  [ -] Alcohol    Allergies    hydrochlorothiazide (Unknown)    Intolerances    	    REVIEW OF SYSTEMS:  CONSTITUTIONAL: No fever  EYES: No eye pain, visual disturbances, or discharge  ENMT:  No difficulty hearing, tinnitus  NECK: No pain or stiffness  RESPIRATORY: No cough, wheezing,  CARDIOVASCULAR: No chest pain, palpitations, passing out, dizziness, or leg swelling  GASTROINTESTINAL:  No nausea, vomiting, diarrhea or constipation. No melena.  GENITOURINARY: No dysuria, hematuria  NEUROLOGICAL: No stroke like symptoms  SKIN: No burning or lesions   ENDOCRINE: No heat or cold intolerance  MUSCULOSKELETAL: No joint pain or swelling  PSYCHIATRIC: No  anxiety, mood swings  HEME/LYMPH: No bleeding gums  ALLERGY AND IMMUNOLOGIC: No hives or eczema	    All other ROS negative    PHYSICAL EXAM:  T(C): 36.4 (12-29-22 @ 08:00), Max: 36.8 (12-29-22 @ 00:55)  HR: 58 (12-29-22 @ 09:30) (54 - 85)  BP: 100/58 (12-29-22 @ 09:30) (82/60 - 126/49)  RR: 20 (12-29-22 @ 09:30) (10 - 20)  SpO2: 99% (12-29-22 @ 09:30) (95% - 100%)  Wt(kg): --  I&O's Summary    28 Dec 2022 07:01  -  29 Dec 2022 07:00  --------------------------------------------------------  IN: 1800 mL / OUT: 650 mL / NET: 1150 mL        Appearance: Normal	  HEENT:   Normal oral mucosa, EOMI	  Cardiovascular:  S1 S2, No JVD,    Respiratory: Lungs clear to auscultation	  Psychiatry: Alert  Gastrointestinal:  Soft, Non-tender, + BS	  Skin: No rashes   Neurologic: Non-focal  Extremities:  No edema  Vascular: Peripheral pulses palpable    	    	  	  CARDIAC MARKERS:  Labs personally reviewed by me                                  13.8   12.25 )-----------( 205      ( 29 Dec 2022 03:00 )             43.6     12-29    140  |  103  |  30<H>  ----------------------------<  135<H>  3.7   |  25  |  0.91    Ca    8.8      29 Dec 2022 03:00  Phos  3.3     12-29  Mg     2.0     12-29    TPro  6.4  /  Alb  3.2<L>  /  TBili  0.9  /  DBili  x   /  AST  329<H>  /  ALT  93<H>  /  AlkPhos  86  12-29          EKG: Personally reviewed by me - SB   Radiology: Personally reviewed by me -   < from: Xray Chest 1 View- PORTABLE-Urgent (12.28.22 @ 19:42) >  Clear lungs.    < end of copied text >  < from: CT Head No Cont (12.29.22 @ 03:01) >  Grossly stable left holohemispheric subdural hematoma, with associated   mass effect and rightward midline shift. Stable ventricular size without   evidence of hydrocephalus, at this time.    < end of copied text >  < from: CT Cervical Spine No Cont (12.29.22 @ 03:00) >    No acute cervical spine fracture or evidence of genetic malalignment.    < end of copied text >  < from: TTE with Doppler (w/Cont) (12.29.22 @ 08:32) >  EF (Canales Rule): 61 %Doppler Peak Velocity (m/sec):  AoV=1.8  ------------------------------------------------------------------------  Observations:  Mitral Valve: Normal mitral valve. No mitral regurgitation  seen.  Aortic Valve/Aorta: Calcified trileaflet aortic valve with  decreased opening. Peak transaortic valve gradient equals  13 mm Hg, consistent with mild aortic stenosis. No aortic  valve regurgitation seen. Peak left ventricular outflow  tract gradient equals 3 mm Hg.  Aortic Root: 2.8 cm.  LVOT diameter: 2 cm.  Left Atrium: Left atrium not well visualized.  Left Ventricle: Endocardial visualization enhanced with  intravenous injection of Ultrasonic Enhancing Agent  (Definity). Hyperdynamic left ventricular systolic  function. LVEF calculated using biplane Canales's method is  61%. Increased relative wall thickness with normal left  ventricular mass index, consistent with concentric left  ventricular remodeling. Normal diastolic function.  Right Heart: Normal right atrium. Normal right ventricular  size and function. Normal tricuspid valve. No tricuspid  regurgitation. Normal pulmonic valve.  Pericardium/Pleura: No pericardial effusion seen.  Hemodynamic: Estimated right ventricular systolic pressure  equals 28 mm Hg, assuming right atrial pressure equals 3 mm  Hg, consistent with normal pulmonary pressures.  ------------------------------------------------------------------------  Conclusions:  1. Left atrium not well visualized.Increased relative wall  thickness with normal left ventricular mass index,  consistent with concentric left ventricular remodeling.  Endocardial visualization enhanced with intravenous  injection of Ultrasonic Enhancing Agent (Definity).  Hyperdynamic left ventricular systolic function. LVEF  calculated using biplane Canales's method is 61%.Normal  diastolic function.  2. Normal right atrium. Normal right ventricular size and  function.  3. Normal tricuspid valve. No tricuspid  regurgitation.Estimated pulmonary artery systolic pressure  equals 28 mm Hg, assuming right atrial pressure equals 3  mm Hg, consistent with normal pulmonary pressures.  4. No pericardialeffusion seen.  *** Compared with echocardiogram of 3/30/2022, no  significant changes noted.    < end of copied text >      Assessment /Plan:     Ms. Laya Arevalo is a 81 yo female with PMH of HTN, HLD and glaucoma who presents after fall with SDH now admitted for NSCU for further management. 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     Problem/Plan -1  Problem: Cardiac Risk Stratificatin  - ECG with no ischemia noted  - Patient not in decompendated HF  - TTE grossly unremarkable  - Trop likely elevated d/t acute rhabdo secondary to fall and unknown downtime  - No hx of severe MS/AS.  - No hx of tachy steven arrhythmia  - Patient is mod risk for mod risk non-elective neurosurgery. No contraindication to proceed.    Problem/Plan -2  Problem: Elevated Trop  - ECG with no ischemia noted  - TTE unremarkable with normal LV and no WMA  - Elevated trop likely elevated d/t acute rhabdo secondary to fall and unknown downtime    Problem/Plan -3  Problem: Acute Rhabdomyolysis  - Patient s/p fall with unknown downtime  - CK upon presentation 70620 now downtrending (7407)  - Cont to closely trend  - IVF as per NSICU    Problem/Plan -4  Problem:  Hypertension  - BP soft off home medication (takes amlodipine 10mg PO daily at home)    Problem/Plan -5  Problem: HLD  - resume Atorvastatin when able to tolerate PO        Differential diagnosis and plan of care discussed with patient after the evaluation. Counseling on diet, nutritional counseling, weight management, exercise and medication compliance was done.   Advanced care planning/advanced directives discussed with patient/family. DNR status including forceful chest compressions to attempt to restart the heart, ventilator support/artificial breathing, electric shock, artificial nutrition, health care proxy, Molst form all discussed with pt. Pt wishes to consider. More than fifteen minutes spent on discussing advanced directives.     Gemma Perez [facility]-BC  Nathan Jones DO [facility]  Cardiovascular Medicine  [address] [place] [address] Dr, [address]  Available for call or text via [employer]  Office [phone]

## 2022-12-29 NOTE — PRE-ANESTHESIA EVALUATION ADULT - NSANTHPEFT_GEN_ALL_CORE
General: Well appearing, no distress  CV: Regular  Pulm: Unlabored  Neuro: AAOx1 (name), opens eyes spontaneously, unable to follow commands. Right Upper Extremity and Right Lower Extremity weakness.

## 2022-12-29 NOTE — PROGRESS NOTE ADULT - ASSESSMENT
ASSESSMENT:   82F on ASA (last took 1-2d ago), hx HTN, HLD, glaucoma. Fall yest evening or this AM (pt doesn’t remember). CTH: largely chronic L SDH (approx 2cm) w/ 8mm MLS. Exam: AOx2, PERRL, EOMI, no facial, slight R drift, LOWERY at least 4+/5.      NEURO:  [id number]  CT Head overnight  preop for OR brandon vs friday  keppra sz ppx  check aru  Activity: [] OOB as tolerated [x] Bedrest [] PT [] OT [] PMNR    PULM: RA  Incentive spirometry, mobilize as tolerated    CV: cardio to follow  ecg nsr, nml qtc  trend trop  tte  amlodipine w holding parameters  Keep -160mmHg    RENAL: very high CK, poss rhabdo, trend ck  UA  IVF 100cc until good PO intake  bladder scan/straight q6h    GI: lfts  Diet: keep npo  GI prophylaxis   Bowel regimen standing    ENDO: a1c  tsh  Goal euglycemia (-180)    HEME/ONC:LED  VTE prophylaxis: [x] SCDs     ID: vanc/zosyn given in ED for hypotension, blood cultures sent  monitor off abx      MISC:    SOCIAL/FAMILY:  [] awaiting [x] updated at bedside [] family meeting    CODE STATUS:  [x] Full Code [] DNR [] DNI [] Palliative/Comfort Care    DISPOSITION:  [x] ICU [] Stroke Unit [] Floor [] EMU [] RCU [] PCU    Contact: [phone] ASSESSMENT:   82F on ASA (last took 1-2d ago), hx HTN, HLD, glaucoma. Fall yest evening or this AM (pt doesn’t remember). CTH: largely chronic L SDH (approx 2cm) w/ 8mm MLS. Exam: AOx2, PERRL, EOMI, no facial, slight R drift, LOWERY at least 4+/5.      NEURO:  [id number]  CT Head overnight  preop for OR brandon vs friday  keppra sz ppx  check aru 574  Activity: [] OOB as tolerated [x] Bedrest [] PT [] OT [] PMNR    PULM: RA  Incentive spirometry, mobilize as tolerated    CV: cardio to follow  ecg nsr, nml qtc  trend trop  tte, syncope workup  amlodipine w holding parameters  Keep -160mmHg    RENAL: very high CK, poss rhabdo, trend ck q12  UA  IVF 100cc until good PO intake  bladder scan/straight q6h    GI: lfts  Diet: keep npo  GI prophylaxis   Bowel regimen standing    ENDO: a1c  tsh  Goal euglycemia (-180)    HEME/ONC:LED  VTE prophylaxis: [x] SCDs     ID: vanc/zosyn given in ED for hypotension, blood cultures sent  monitor off abx      MISC:    SOCIAL/FAMILY:  [] awaiting [x] updated at bedside [] family meeting    CODE STATUS:  [x] Full Code [] DNR [] DNI [] Palliative/Comfort Care    DISPOSITION:  [x] ICU [] Stroke Unit [] Floor [] EMU [] RCU [] PCU    Contact: [phone]

## 2022-12-30 PROBLEM — E78.5 HYPERLIPIDEMIA, UNSPECIFIED: Chronic | Status: ACTIVE | Noted: 2022-03-29

## 2022-12-30 PROBLEM — I10 ESSENTIAL (PRIMARY) HYPERTENSION: Chronic | Status: ACTIVE | Noted: 2022-03-29

## 2022-12-30 LAB
ANION GAP SERPL CALC-SCNC: 5 MMOL/L — SIGNIFICANT CHANGE UP (ref 5–17)
BUN SERPL-MCNC: 16 MG/DL — SIGNIFICANT CHANGE UP (ref 7–23)
CALCIUM SERPL-MCNC: 7.9 MG/DL — LOW (ref 8.4–10.5)
CHLORIDE SERPL-SCNC: 110 MMOL/L — HIGH (ref 96–108)
CHLORIDE UR-SCNC: 114 MMOL/L — SIGNIFICANT CHANGE UP
CK SERPL-CCNC: 2854 U/L — HIGH (ref 25–170)
CK SERPL-CCNC: 4014 U/L — HIGH (ref 25–170)
CO2 SERPL-SCNC: 25 MMOL/L — SIGNIFICANT CHANGE UP (ref 22–31)
CREAT ?TM UR-MCNC: 105 MG/DL — SIGNIFICANT CHANGE UP
CREAT SERPL-MCNC: 0.53 MG/DL — SIGNIFICANT CHANGE UP (ref 0.5–1.3)
EGFR: 92 ML/MIN/1.73M2 — SIGNIFICANT CHANGE UP
GLUCOSE BLDC GLUCOMTR-MCNC: 111 MG/DL — HIGH (ref 70–99)
GLUCOSE BLDC GLUCOMTR-MCNC: 113 MG/DL — HIGH (ref 70–99)
GLUCOSE BLDC GLUCOMTR-MCNC: 185 MG/DL — HIGH (ref 70–99)
GLUCOSE BLDC GLUCOMTR-MCNC: 74 MG/DL — SIGNIFICANT CHANGE UP (ref 70–99)
GLUCOSE BLDC GLUCOMTR-MCNC: 85 MG/DL — SIGNIFICANT CHANGE UP (ref 70–99)
GLUCOSE BLDC GLUCOMTR-MCNC: 86 MG/DL — SIGNIFICANT CHANGE UP (ref 70–99)
GLUCOSE BLDC GLUCOMTR-MCNC: 87 MG/DL — SIGNIFICANT CHANGE UP (ref 70–99)
GLUCOSE BLDC GLUCOMTR-MCNC: 88 MG/DL — SIGNIFICANT CHANGE UP (ref 70–99)
GLUCOSE SERPL-MCNC: 93 MG/DL — SIGNIFICANT CHANGE UP (ref 70–99)
HCT VFR BLD CALC: 38.4 % — SIGNIFICANT CHANGE UP (ref 34.5–45)
HGB BLD-MCNC: 11.4 G/DL — LOW (ref 11.5–15.5)
MAGNESIUM SERPL-MCNC: 1.9 MG/DL — SIGNIFICANT CHANGE UP (ref 1.6–2.6)
MCHC RBC-ENTMCNC: 26.8 PG — LOW (ref 27–34)
MCHC RBC-ENTMCNC: 29.7 GM/DL — LOW (ref 32–36)
MCV RBC AUTO: 90.1 FL — SIGNIFICANT CHANGE UP (ref 80–100)
MRSA PCR RESULT.: SIGNIFICANT CHANGE UP
NRBC # BLD: 0 /100 WBCS — SIGNIFICANT CHANGE UP (ref 0–0)
PHOSPHATE 24H UR-MCNC: 76.4 MG/DL — SIGNIFICANT CHANGE UP
PHOSPHATE SERPL-MCNC: 1.7 MG/DL — LOW (ref 2.5–4.5)
PLATELET # BLD AUTO: 157 K/UL — SIGNIFICANT CHANGE UP (ref 150–400)
POTASSIUM SERPL-MCNC: 3.9 MMOL/L — SIGNIFICANT CHANGE UP (ref 3.5–5.3)
POTASSIUM SERPL-SCNC: 3.9 MMOL/L — SIGNIFICANT CHANGE UP (ref 3.5–5.3)
POTASSIUM UR-SCNC: 67 MMOL/L — SIGNIFICANT CHANGE UP
RBC # BLD: 4.26 M/UL — SIGNIFICANT CHANGE UP (ref 3.8–5.2)
RBC # FLD: 14.9 % — HIGH (ref 10.3–14.5)
S AUREUS DNA NOSE QL NAA+PROBE: SIGNIFICANT CHANGE UP
SODIUM SERPL-SCNC: 140 MMOL/L — SIGNIFICANT CHANGE UP (ref 135–145)
SODIUM UR-SCNC: 78 MMOL/L — SIGNIFICANT CHANGE UP
TROPONIN T, HIGH SENSITIVITY RESULT: 212 NG/L — HIGH (ref 0–51)
WBC # BLD: 7.22 K/UL — SIGNIFICANT CHANGE UP (ref 3.8–10.5)
WBC # FLD AUTO: 7.22 K/UL — SIGNIFICANT CHANGE UP (ref 3.8–10.5)

## 2022-12-30 PROCEDURE — 99233 SBSQ HOSP IP/OBS HIGH 50: CPT

## 2022-12-30 PROCEDURE — 70450 CT HEAD/BRAIN W/O DYE: CPT | Mod: 26

## 2022-12-30 RX ORDER — CYCLOSPORINE 0.5 MG/ML
1 EMULSION OPHTHALMIC
Refills: 0 | Status: DISCONTINUED | OUTPATIENT
Start: 2022-12-30 | End: 2023-01-04

## 2022-12-30 RX ORDER — SODIUM CHLORIDE 5 G/100ML
500 INJECTION, SOLUTION INTRAVENOUS
Refills: 0 | Status: DISCONTINUED | OUTPATIENT
Start: 2022-12-30 | End: 2022-12-30

## 2022-12-30 RX ORDER — SODIUM CHLORIDE 9 MG/ML
1000 INJECTION, SOLUTION INTRAVENOUS
Refills: 0 | Status: DISCONTINUED | OUTPATIENT
Start: 2022-12-30 | End: 2022-12-30

## 2022-12-30 RX ORDER — POTASSIUM PHOSPHATE, MONOBASIC POTASSIUM PHOSPHATE, DIBASIC 236; 224 MG/ML; MG/ML
30 INJECTION, SOLUTION INTRAVENOUS ONCE
Refills: 0 | Status: COMPLETED | OUTPATIENT
Start: 2022-12-30 | End: 2022-12-30

## 2022-12-30 RX ORDER — SODIUM CHLORIDE 9 MG/ML
1000 INJECTION, SOLUTION INTRAVENOUS ONCE
Refills: 0 | Status: COMPLETED | OUTPATIENT
Start: 2022-12-30 | End: 2022-12-30

## 2022-12-30 RX ADMIN — ATORVASTATIN CALCIUM 20 MILLIGRAM(S): 80 TABLET, FILM COATED ORAL at 21:37

## 2022-12-30 RX ADMIN — POLYETHYLENE GLYCOL 3350 17 GRAM(S): 17 POWDER, FOR SOLUTION ORAL at 12:40

## 2022-12-30 RX ADMIN — LATANOPROST 1 DROP(S): 0.05 SOLUTION/ DROPS OPHTHALMIC; TOPICAL at 23:10

## 2022-12-30 RX ADMIN — BRIVARACETAM 220 MILLIGRAM(S): 25 TABLET, FILM COATED ORAL at 05:12

## 2022-12-30 RX ADMIN — Medication 100 MILLIEQUIVALENT(S): at 02:00

## 2022-12-30 RX ADMIN — SENNA PLUS 2 TABLET(S): 8.6 TABLET ORAL at 21:37

## 2022-12-30 RX ADMIN — CYCLOSPORINE 1 DROP(S): 0.5 EMULSION OPHTHALMIC at 18:05

## 2022-12-30 RX ADMIN — POTASSIUM PHOSPHATE, MONOBASIC POTASSIUM PHOSPHATE, DIBASIC 83.33 MILLIMOLE(S): 236; 224 INJECTION, SOLUTION INTRAVENOUS at 10:12

## 2022-12-30 RX ADMIN — CYCLOSPORINE 1 DROP(S): 0.5 EMULSION OPHTHALMIC at 05:23

## 2022-12-30 RX ADMIN — CHLORHEXIDINE GLUCONATE 1 APPLICATION(S): 213 SOLUTION TOPICAL at 21:37

## 2022-12-30 RX ADMIN — BRIVARACETAM 220 MILLIGRAM(S): 25 TABLET, FILM COATED ORAL at 18:05

## 2022-12-30 RX ADMIN — SODIUM CHLORIDE 1000 MILLILITER(S): 9 INJECTION, SOLUTION INTRAVENOUS at 09:39

## 2022-12-30 RX ADMIN — SODIUM CHLORIDE 100 MILLILITER(S): 9 INJECTION, SOLUTION INTRAVENOUS at 08:30

## 2022-12-30 NOTE — PROGRESS NOTE ADULT - SUBJECTIVE AND OBJECTIVE BOX
NSCU Progress Note    Assessment/Hospital Course:    82F on ASA, hx HTN, HLD, glaucoma, fully independent, who was found down for unknown duration LKW 12/27 PM, found to have soiled herself, presented w hypotension to ED,  CTH: largely chronic L SDH (approx 2cm) w/ 8mm MLS, admitted to NSICU for ongoing management.     24 Hour Events/Subjective:  - POD 1 L francis holes for SDH evac  - CTH with post op changes, some residual and pneumocephalus  tolerating po    REVIEW OF SYSTEMS: [] Unable to Assess due to neurologic exam   [x ] All ROS addressed below are non-contributory, except:  Neuro: [x ] Headache [ ] Back pain [ ] Numbness [ ] Weakness [ ] Ataxia [ ] Dizziness [ ] Aphasia [ ] Dysarthria [ ] Visual disturbance  Resp: [ ] Shortness of breath/dyspnea [ ] Orthopnea [ ] Cough  CV: [ ] Chest pain [ ] Palpitation [ ] Lightheadedness [ ] Syncope  Renal: [ ] Thirst [ ] Edema  GI: [ ] Nausea [ ] Emesis [ ] Abdominal pain [ ] Constipation [ ] Diarrhea  Hem: [ ] Hematemesis [ ] bBright red blood per rectum  ID: [ ] Fever [ ] Chills [ ] Dysuria  ENT: [ ] Rhinorrhea    VITALS:   - Reviewed    IMAGING/DATA:   - Reviewed    PHYSICAL EXAM:    General: calm  CVS: RRR  Pulm: CTAB  GI: Soft, NTND  Extremities: No LE Edema  Neuro: Awake, Ox3, no facial, 3mm reactive b/l, follows commands, RUE 4+/5 L side 5/5  NSCU Progress Note    Assessment/Hospital Course:    82F on ASA, hx HTN, HLD, glaucoma, fully independent, who was found down for unknown duration LKW 12/27 PM, found to have soiled herself, presented w hypotension to ED,  CTH: largely chronic L SDH (approx 2cm) w/ 8mm MLS, admitted to NSICU for ongoing management.     24 Hour Events/Subjective:  - POD 1 L francis holes for SDH evac  - CTH with post op changes, some residual and pneumocephalus  tolerating po    REVIEW OF SYSTEMS: [] Unable to Assess due to neurologic exam   [x ] All ROS addressed below are non-contributory, except:  Neuro: [x ] Headache [ ] Back pain [ ] Numbness [ ] Weakness [ ] Ataxia [ ] Dizziness [ ] Aphasia [ ] Dysarthria [ ] Visual disturbance  Resp: [ ] Shortness of breath/dyspnea [ ] Orthopnea [ ] Cough  CV: [ ] Chest pain [ ] Palpitation [ ] Lightheadedness [ ] Syncope  Renal: [ ] Thirst [ ] Edema  GI: [ ] Nausea [ ] Emesis [ ] Abdominal pain [ ] Constipation [ ] Diarrhea  Hem: [ ] Hematemesis [ ] bBright red blood per rectum  ID: [ ] Fever [ ] Chills [ ] Dysuria  ENT: [ ] Rhinorrhea    VITALS:   - Reviewed    IMAGING/DATA:   - Reviewed    PHYSICAL EXAM:    General: calm  CVS: RRR  Pulm: CTAB  GI: Soft, NTND  Extremities: No LE Edema  Neuro: Awake, Ox3, no facial, 3mm reactive b/l, follows commands, RUE 4+/5 LUE 5/5 LLE 3/5 (?baseline)

## 2022-12-30 NOTE — PROGRESS NOTE ADULT - ASSESSMENT
chronic L SDH w/ 8mm MLS s/p L francis hole    NEURO:  [id number]  CTH today stable  Briviact due to thrombocytopenia for sz ppx  Pain control, tylenol as needed   1 NANDO drain to full suction, per nsg  MMAE planning per nsg (pt declined)    PULM:   RA  Incentive spirometry  mobilize as tolerated    CV:   SBP 90-160mmHg  Cards following  ECG NSR   TTE, EF 61%, syncope workup  Amlodipine w holding parameters      RENAL:   Fluids: IVL  bladder scan/straight q6h  trend CK, downtrending  dc orona      GI:   Diet: reg diet  Bowel regimen standing, senna, miralax       ENDO:   HgA1C 6.1  TSH 2.48   Goal euglycemia (-180)  ISS    HEME/ONC:  LED  SCDs   SQL once drains out  Monitor thrombocytopenia, repeat CBC    ID:   Vanc/Zosyn given in ED for hypotension, blood cultures sent  Monitor off abx  afebrile       Dispo: ICU for SD drain chronic L SDH w/ 8mm MLS s/p L francis hole    NEURO:  [id number]  CTH today stable  Briviact due to thrombocytopenia for sz ppx  Pain control, tylenol as needed   1 NANDO drain to full suction, per nsg  MMAE planning per nsg (pt declined)  PT OT    PULM:   RA  Incentive spirometry  mobilize as tolerated    CV:   SBP 90-160mmHg  Cards following  ECG NSR   TTE, EF 61%, syncope workup  Amlodipine w holding parameters      RENAL:   Fluids: IVL  bladder scan/straight q6h   CK, downtrending  dc orona in am      GI:   Diet: reg diet  Bowel regimen standing, senna, miralax       ENDO:   HgA1C 6.1  TSH 2.48   Goal euglycemia (-180)  ISS    HEME/ONC:  LED  SCDs   SQL once drains out  Monitor thrombocytopenia, repeat CBC    ID:   Vanc/Zosyn given in ED for hypotension, blood cultures sent  Monitor off abx  afebrile       Dispo: ICU for SD drain

## 2022-12-30 NOTE — SWALLOW BEDSIDE ASSESSMENT ADULT - ADDITIONAL RECOMMENDATIONS
No further dysphagia follow up warranted at this time. Consider formal speech and language evaluation if cognitive status does not improve or is judged to be off from baseline.

## 2022-12-30 NOTE — PROGRESS NOTE ADULT - SUBJECTIVE AND OBJECTIVE BOX
NSCU Progress Note    Assessment/Hospital Course:    82F on ASA, hx HTN, HLD, glaucoma, fully independent, who was found down for unknown duration LKW 12/27 PM, found to have soiled herself, presented w hypotension to ED,  CTH: largely chronic L SDH (approx 2cm) w/ 8mm MLS, admitted to NSICU for ongoing management.     24 Hour Events/Subjective:  - Admitted overnight  - CK >13K, trending down     REVIEW OF SYSTEMS: [x] Unable to Assess due to neurologic exam   [ ] All ROS addressed below are non-contributory, except:  Neuro: [ ] Headache [ ] Back pain [ ] Numbness [ ] Weakness [ ] Ataxia [ ] Dizziness [ ] Aphasia [ ] Dysarthria [ ] Visual disturbance  Resp: [ ] Shortness of breath/dyspnea [ ] Orthopnea [ ] Cough  CV: [ ] Chest pain [ ] Palpitation [ ] Lightheadedness [ ] Syncope  Renal: [ ] Thirst [ ] Edema  GI: [ ] Nausea [ ] Emesis [ ] Abdominal pain [ ] Constipation [ ] Diarrhea  Hem: [ ] Hematemesis [ ] bBright red blood per rectum  ID: [ ] Fever [ ] Chills [ ] Dysuria  ENT: [ ] Rhinorrhea    VITALS:   - Reviewed    IMAGING/DATA:   - Reviewed    PHYSICAL EXAM:    General: calm  CVS: RRR  Pulm: CTAB  GI: Soft, NTND  Extremities: No LE Edema  Neuro: lethargic, AOx1 (name, [place], unsure of year), no facial, 3mm reative b/l, KHRIS, rarely follows commands on uppers, LUE AG, RUE barely AG distally, LLE 2/5, RLE WD NSCU Progress Note    Assessment/Hospital Course:    82F on ASA, hx HTN, HLD, glaucoma, fully independent, who was found down for unknown duration LKW 12/27 PM, found to have soiled herself, presented w hypotension to ED,  CTH: largely chronic L SDH (approx 2cm) w/ 8mm MLS, admitted to NSICU for ongoing management.     24 Hour Events/Subjective:  - POD 1 L francis holes for SDH evac  - hypoglycemic, started on D5 1/2 NS while NPO          REVIEW OF SYSTEMS: [x] Unable to Assess due to neurologic exam   [ ] All ROS addressed below are non-contributory, except:  Neuro: [ ] Headache [ ] Back pain [ ] Numbness [ ] Weakness [ ] Ataxia [ ] Dizziness [ ] Aphasia [ ] Dysarthria [ ] Visual disturbance  Resp: [ ] Shortness of breath/dyspnea [ ] Orthopnea [ ] Cough  CV: [ ] Chest pain [ ] Palpitation [ ] Lightheadedness [ ] Syncope  Renal: [ ] Thirst [ ] Edema  GI: [ ] Nausea [ ] Emesis [ ] Abdominal pain [ ] Constipation [ ] Diarrhea  Hem: [ ] Hematemesis [ ] bBright red blood per rectum  ID: [ ] Fever [ ] Chills [ ] Dysuria  ENT: [ ] Rhinorrhea    VITALS:   - Reviewed    IMAGING/DATA:   - Reviewed    PHYSICAL EXAM:    General: calm  CVS: RRR  Pulm: CTAB  GI: Soft, NTND  Extremities: No LE Edema  Neuro: Awake, Ox3, no facial, 3mm reactive b/l, follows commands, R side 5/5 w/ intermittent drift, L side 5/5  NSCU Progress Note    Assessment/Hospital Course:    82F on ASA, hx HTN, HLD, glaucoma, fully independent, who was found down for unknown duration LKW 12/27 PM, found to have soiled herself, presented w hypotension to ED,  CTH: largely chronic L SDH (approx 2cm) w/ 8mm MLS, admitted to NSICU for ongoing management.     24 Hour Events/Subjective:  - POD 1 L francis holes for SDH evac  - hypoglycemic, started on D5 1/2 NS while NPO and given 1amp Dextrose  - CTH with post op changes, some residual and pneumocephalus  - SD drain output 60cc/shift today          REVIEW OF SYSTEMS: [x] Unable to Assess due to neurologic exam   [ ] All ROS addressed below are non-contributory, except:  Neuro: [ ] Headache [ ] Back pain [ ] Numbness [ ] Weakness [ ] Ataxia [ ] Dizziness [ ] Aphasia [ ] Dysarthria [ ] Visual disturbance  Resp: [ ] Shortness of breath/dyspnea [ ] Orthopnea [ ] Cough  CV: [ ] Chest pain [ ] Palpitation [ ] Lightheadedness [ ] Syncope  Renal: [ ] Thirst [ ] Edema  GI: [ ] Nausea [ ] Emesis [ ] Abdominal pain [ ] Constipation [ ] Diarrhea  Hem: [ ] Hematemesis [ ] bBright red blood per rectum  ID: [ ] Fever [ ] Chills [ ] Dysuria  ENT: [ ] Rhinorrhea    VITALS:   - Reviewed    IMAGING/DATA:   - Reviewed    PHYSICAL EXAM:    General: calm  CVS: RRR  Pulm: CTAB  GI: Soft, NTND  Extremities: No LE Edema  Neuro: Awake, Ox3, no facial, 3mm reactive b/l, follows commands, RUE 4+/5 L side 5/5  NSCU Progress Note    Assessment/Hospital Course:    82F on ASA, hx HTN, HLD, glaucoma, fully independent, who was found down for unknown duration LKW 12/27 PM, found to have soiled herself, presented w hypotension to ED,  CTH: largely chronic L SDH (approx 2cm) w/ 8mm MLS, admitted to NSICU for ongoing management.     24 Hour Events/Subjective:  - POD 1 L francis holes for SDH evac  - hypoglycemic, started on D5 1/2 NS while NPO and given 1amp Dextrose  - CTH with post op changes, some residual and pneumocephalus  - SD drain output 60cc/shift today    REVIEW OF SYSTEMS: [] Unable to Assess due to neurologic exam   [x ] All ROS addressed below are non-contributory, except:  Neuro: [x ] Headache [ ] Back pain [ ] Numbness [ ] Weakness [ ] Ataxia [ ] Dizziness [ ] Aphasia [ ] Dysarthria [ ] Visual disturbance  Resp: [ ] Shortness of breath/dyspnea [ ] Orthopnea [ ] Cough  CV: [ ] Chest pain [ ] Palpitation [ ] Lightheadedness [ ] Syncope  Renal: [ ] Thirst [ ] Edema  GI: [ ] Nausea [ ] Emesis [ ] Abdominal pain [ ] Constipation [ ] Diarrhea  Hem: [ ] Hematemesis [ ] bBright red blood per rectum  ID: [ ] Fever [ ] Chills [ ] Dysuria  ENT: [ ] Rhinorrhea    VITALS:   - Reviewed    IMAGING/DATA:   - Reviewed    PHYSICAL EXAM:    General: calm  CVS: RRR  Pulm: CTAB  GI: Soft, NTND  Extremities: No LE Edema  Neuro: Awake, Ox3, no facial, 3mm reactive b/l, follows commands, RUE 4+/5 L side 5/5

## 2022-12-30 NOTE — PROGRESS NOTE ADULT - SUBJECTIVE AND OBJECTIVE BOX
DATE OF SERVICE: 12-30-22 @ 14:51    Patient is a 82y old  Female who presents with a chief complaint of SDH (30 Dec 2022 05:57)      INTERVAL HISTORY: Feels ok. Seen OOB to chair.     REVIEW OF SYSTEMS:  CONSTITUTIONAL: No weakness  EYES/ENT: No visual changes;  No throat pain   NECK: No pain or stiffness  RESPIRATORY: No cough, wheezing; No shortness of breath  CARDIOVASCULAR: No chest pain or palpitations  GASTROINTESTINAL: No abdominal  pain. No nausea, vomiting, or hematemesis  GENITOURINARY: No dysuria, frequency or hematuria  NEUROLOGICAL: No stroke like symptoms  SKIN: No rashes    TELEMETRY Personally reviewed: SB/SR 40-80  	  MEDICATIONS:  amLODIPine   Tablet 10 milliGRAM(s) Oral daily        PHYSICAL EXAM:  T(C): 36.8 (12-30-22 @ 12:00), Max: 37.1 (12-30-22 @ 03:00)  HR: 78 (12-30-22 @ 13:00) (48 - 78)  BP: 100/60 (12-30-22 @ 13:00) (93/53 - 156/72)  RR: 11 (12-30-22 @ 13:00) (7 - 23)  SpO2: 100% (12-30-22 @ 13:00) (91% - 100%)  Wt(kg): --  I&O's Summary    29 Dec 2022 07:01  -  30 Dec 2022 07:00  --------------------------------------------------------  IN: 3020 mL / OUT: 864 mL / NET: 2156 mL    30 Dec 2022 07:01  -  30 Dec 2022 14:51  --------------------------------------------------------  IN: 2115 mL / OUT: 425 mL / NET: 1690 mL      Height (cm): 167.6 (12-29 @ 19:00)    Appearance: In no distress	  HEENT:    PERRL, EOMI	  Cardiovascular:  S1 S2, No JVD  Respiratory: Lungs clear to auscultation	  Gastrointestinal:  Soft, Non-tender, + BS	  Vascularature:  No edema of LE  Psychiatric: Appropriate affect   Neuro: no acute focal deficits                               11.4   7.22  )-----------( 157      ( 30 Dec 2022 04:22 )             38.4     12-30    140  |  110<H>  |  16  ----------------------------<  93  3.9   |  25  |  0.53    Ca    7.9<L>      30 Dec 2022 04:22  Phos  1.7     12-30  Mg     1.9     12-30    TPro  6.4  /  Alb  3.2<L>  /  TBili  0.9  /  DBili  x   /  AST  329<H>  /  ALT  93<H>  /  AlkPhos  86  12-29        Labs personally reviewed      ASSESSMENT/PLAN: 	    Ms. Laya Arevalo is a 81 yo female with PMH of HTN, HLD and glaucoma who presents after fall with SDH now admitted for NSCU for further management. 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     Problem/Plan -1  Problem: Cardiac Risk Stratificatin  - ECG with no ischemia noted  - Patient not in decompendated HF  - TTE grossly unremarkable  - Trop likely elevated d/t acute rhabdo secondary to fall and unknown downtime  - No hx of severe MS/AS.  - No hx of tachy steven arrhythmia  - Patient is mod risk for mod risk non-elective neurosurgery. No contraindication to proceed.  - Tolerated procedure well.     Problem/Plan -2  Problem: Elevated Trop  - ECG with no ischemia noted  - TTE unremarkable with normal LV and no WMA  - Elevated trop likely elevated d/t acute rhabdo secondary to fall and unknown downtime    Problem/Plan -3  Problem: Acute Rhabdomyolysis  - Patient s/p fall with unknown downtime  - CK upon presentation 26577 now downtrending (7407--> 4014)  - Cont to closely trend  - IVF as per NSICU    Problem/Plan -4  Problem:  Hypertension  - Amlodipine 10mg PO daily resumed  - BP wnl    Problem/Plan -5  Problem: HLD  - c/w Atorvastatin           Gemma Perez, AG-NP   Nathan Jones DO [facility]  Cardiovascular Medicine  [address], [address]  Available through call or text on [employer]  Office: [phone]

## 2022-12-30 NOTE — PROGRESS NOTE ADULT - ASSESSMENT
chronic L SDH w/ 8mm MLS s/p L francis hole    NEURO:  POD 0 from L francis hole  [id number]  CTH AM   D/C Keppra, switch to Briviact due to thrombocytopenia   Pain control, tylenol as needed   Bedrest  1 NANDO drain to full suction     PULM:   RA  Incentive spirometry  mobilize as tolerated    CV:   SBP 90-160mmHg  Cards following  ECG NSR   TTE, EF 61%, syncope workup  Amlodipine w holding parameters  Trend Trops    RENAL:   Fluids: NS @ 100cc/hr  bladder scan/straight q6h  trend CK  orona for accurate uop; oliguric    GI:   Diet: NPO, S&S pending   GI prophylaxis not indicated   Bowel regimen standing, senna, miralax   transaminitis, RUQ US when able    ENDO:   HgA1C 6.1  TSH 2.48   Goal euglycemia (-180)  ISS    HEME/ONC:  LED  SCDs   Hold chemoppx for fresh post op   Monitor thrombocytopenia, repeat CBC in AM     ID:   Vanc/Zosyn given in ED for hypotension, blood cultures sent  Monitor off abx  afebrile     at risk for deteriorations due to SDH, midline shift, brain compression   DISPO: ICU       45 minutes of critical care time spent with patient  chronic L SDH w/ 8mm MLS s/p L francis hole    NEURO:  [id number]  CTH today   Briviact due to thrombocytopenia for sz ppx  Pain control, tylenol as needed   Bedrest  1 NANDO drain to full suction, per nsg    PULM:   RA  Incentive spirometry  mobilize as tolerated    CV:   SBP 90-160mmHg  Cards following  ECG NSR   TTE, EF 61%, syncope workup  Amlodipine w holding parameters      RENAL:   Fluids: D5 1/2 NS until tolerating full diet  bladder scan/straight q6h  trend CK, downtrending  orona for accurate uop; oliguric  nephro consult    GI:   Diet: NPO, S&S pending   Bowel regimen standing, senna, miralax   transaminitis, RUQ US when able    ENDO:   HgA1C 6.1  TSH 2.48   Goal euglycemia (-180)  ISS    HEME/ONC:  LED  SCDs   SQL if post op imaging stable  Monitor thrombocytopenia, repeat CBC    ID:   Vanc/Zosyn given in ED for hypotension, blood cultures sent  Monitor off abx  afebrile     at risk for deteriorations due to SDH, midline shift, brain compression   DISPO: ICU       45 minutes of critical care time spent with patient  chronic L SDH w/ 8mm MLS s/p L francis hole    NEURO:  [id number]  CTH today   Briviact due to thrombocytopenia for sz ppx  Pain control, tylenol as needed   1 NANDO drain to full suction, per nsg  MMAE today    PULM:   RA  Incentive spirometry  mobilize as tolerated    CV:   SBP 90-160mmHg  Cards following  ECG NSR   TTE, EF 61%, syncope workup  Amlodipine w holding parameters      RENAL:   Fluids: D5 1/2 NS@100 until tolerating full diet  bladder scan/straight q6h  trend CK, downtrending  orona for accurate uop; oliguric  nephro consult    GI:   Diet: NPO, S&S pending   Bowel regimen standing, senna, miralax   transaminitis, RUQ US when able    ENDO:   HgA1C 6.1  TSH 2.48   Goal euglycemia (-180)  ISS    HEME/ONC:  LED  SCDs   SQL once drains out  Monitor thrombocytopenia, repeat CBC    ID:   Vanc/Zosyn given in ED for hypotension, blood cultures sent  Monitor off abx  afebrile       at risk for deteriorations due to SDH, midline shift, brain compression   DISPO: ICU       Dispo: ICU for SD drain chronic L SDH w/ 8mm MLS s/p L francis hole    NEURO:  [id number]  CTH today   Briviact due to thrombocytopenia for sz ppx  Pain control, tylenol as needed   1 NANDO drain to full suction, per nsg  MMAE today    PULM:   RA  Incentive spirometry  mobilize as tolerated    CV:   SBP 90-160mmHg  Cards following  ECG NSR   TTE, EF 61%, syncope workup  Amlodipine w holding parameters      RENAL:   Fluids: D5 1/2 NS@100 until tolerating full diet  bladder scan/straight q6h  trend CK, downtrending  orona for accurate uop; oliguric  nephro consult    GI:   Diet: NPO, S&S pending   Bowel regimen standing, senna, miralax   transaminitis, RUQ US when able    ENDO:   HgA1C 6.1  TSH 2.48   Goal euglycemia (-180)  ISS    HEME/ONC:  LED  SCDs   SQL once drains out  Monitor thrombocytopenia, repeat CBC    ID:   Vanc/Zosyn given in ED for hypotension, blood cultures sent  Monitor off abx  afebrile       Dispo: ICU for SD drain

## 2022-12-30 NOTE — PHYSICAL THERAPY INITIAL EVALUATION ADULT - GENERAL OBSERVATIONS, REHAB EVAL
received semisupine in bed, A&OX2-3, following simple commands, pleasantly forgetful/confused, eager to participate, admitted s/p fall, left SDH, s/p left francis hole (12/29), +NANDO, +ICU monitoring

## 2022-12-30 NOTE — OCCUPATIONAL THERAPY INITIAL EVALUATION ADULT - LIVES WITH, PROFILE
Pt confused, states she lives with daughter in apartment and was I in ADLs and ambulation prior to admission

## 2022-12-30 NOTE — PHYSICAL THERAPY INITIAL EVALUATION ADULT - GAIT DEVIATIONS NOTED, PT EVAL
decreased josué/increased time in double stance/decreased velocity of limb motion/decreased step length/decreased weight-shifting ability

## 2022-12-30 NOTE — SWALLOW BEDSIDE ASSESSMENT ADULT - SLP GENERAL OBSERVATIONS
Patient encountered sitting upright in bed, RN Marija present. IV's in place. Tele-monitoring in place. HR 64, SPO2 96, RR 19. Patient oriented to self and year with some confusion over day and month and name of hospital. Patient following basic commands and participating in conversation appropriately.

## 2022-12-30 NOTE — PHYSICAL THERAPY INITIAL EVALUATION ADULT - PERTINENT HX OF CURRENT PROBLEM, REHAB EVAL
Pt is 82F admitted 12/28/22 on ASA (last took 1-2d ago), PMHx HTN, HLD, glaucoma. Fall yest evening or this AM (pt doesn’t remember). CTH: largely chronic L SDH (approx 2cm) w/ 8mm MLS.     CTH: Grossly stable left holohemispheric subdural hematoma, with associated   mass effect and rightward midline shift. Stable ventricular size without   evidence of hydrocephalus, at this time.

## 2022-12-30 NOTE — SWALLOW BEDSIDE ASSESSMENT ADULT - SLP PERTINENT HISTORY OF CURRENT PROBLEM
82F on ASA, hx HTN, HLD, glaucoma, fully independent, who was found down for unknown duration LKW 12/27 PM, found to have soiled herself, presented w hypotension to ED,  CTH: largely chronic L SDH (approx 2cm) w/ 8mm MLS, admitted to NSCU for ongoing management. S/p L [place] 12/29 for SDH Evac.

## 2022-12-30 NOTE — SWALLOW BEDSIDE ASSESSMENT ADULT - COMMENTS
Imaging 12/29:  CT Head: IMPRESSION:    Grossly stable left holohemispheric subdural hematoma, with associated   mass effect and rightward midline shift. Stable ventricular size without   evidence of hydrocephalus, at this time.    Per NSCU Fellow 12/30:  Hypoglycemic while NPO, D5 1/2 NS until tolerating full diet

## 2022-12-30 NOTE — SWALLOW BEDSIDE ASSESSMENT ADULT - SWALLOW EVAL: DIAGNOSIS
Patient is an 81 y/o F admitted with chronic L SDH w/ 8mm MLS s/p L francis hole. Patient now being assessed for dysphagia. Patient's oropharyngeal swallow function appears to be WFL and at baseline at this time. No overt s/s penetration/aspiration observed across trials and consistencies tested.

## 2022-12-30 NOTE — OCCUPATIONAL THERAPY INITIAL EVALUATION ADULT - PERTINENT HX OF CURRENT PROBLEM, REHAB EVAL
81 yo F on ASA (last took 1-2d ago), hx HTN, HLD, glaucoma. Fall yest evening or this AM (pt doesn’t remember). CTH: largely chronic L SDH (approx 2cm) w/ 8mm MLS. Exam: AOx2, PERRL, EOMI, no facial, slight R drift, LOWERY at least 4+/5. Pt s/p L francis hole

## 2022-12-31 LAB
ALBUMIN SERPL ELPH-MCNC: 2.6 G/DL — LOW (ref 3.3–5)
ALP SERPL-CCNC: 69 U/L — SIGNIFICANT CHANGE UP (ref 40–120)
ALT FLD-CCNC: 57 U/L — HIGH (ref 10–45)
ANION GAP SERPL CALC-SCNC: 7 MMOL/L — SIGNIFICANT CHANGE UP (ref 5–17)
ANION GAP SERPL CALC-SCNC: 8 MMOL/L — SIGNIFICANT CHANGE UP (ref 5–17)
AST SERPL-CCNC: 143 U/L — HIGH (ref 10–40)
BILIRUB SERPL-MCNC: 0.5 MG/DL — SIGNIFICANT CHANGE UP (ref 0.2–1.2)
BUN SERPL-MCNC: 11 MG/DL — SIGNIFICANT CHANGE UP (ref 7–23)
BUN SERPL-MCNC: 13 MG/DL — SIGNIFICANT CHANGE UP (ref 7–23)
CALCIUM SERPL-MCNC: 8 MG/DL — LOW (ref 8.4–10.5)
CALCIUM SERPL-MCNC: 8.1 MG/DL — LOW (ref 8.4–10.5)
CHLORIDE SERPL-SCNC: 103 MMOL/L — SIGNIFICANT CHANGE UP (ref 96–108)
CHLORIDE SERPL-SCNC: 108 MMOL/L — SIGNIFICANT CHANGE UP (ref 96–108)
CK SERPL-CCNC: 1384 U/L — HIGH (ref 25–170)
CO2 SERPL-SCNC: 23 MMOL/L — SIGNIFICANT CHANGE UP (ref 22–31)
CO2 SERPL-SCNC: 28 MMOL/L — SIGNIFICANT CHANGE UP (ref 22–31)
CREAT SERPL-MCNC: 0.48 MG/DL — LOW (ref 0.5–1.3)
CREAT SERPL-MCNC: 0.52 MG/DL — SIGNIFICANT CHANGE UP (ref 0.5–1.3)
EGFR: 93 ML/MIN/1.73M2 — SIGNIFICANT CHANGE UP
EGFR: 95 ML/MIN/1.73M2 — SIGNIFICANT CHANGE UP
GLUCOSE BLDC GLUCOMTR-MCNC: 112 MG/DL — HIGH (ref 70–99)
GLUCOSE BLDC GLUCOMTR-MCNC: 116 MG/DL — HIGH (ref 70–99)
GLUCOSE SERPL-MCNC: 106 MG/DL — HIGH (ref 70–99)
GLUCOSE SERPL-MCNC: 81 MG/DL — SIGNIFICANT CHANGE UP (ref 70–99)
HCT VFR BLD CALC: 38.9 % — SIGNIFICANT CHANGE UP (ref 34.5–45)
HGB BLD-MCNC: 12.4 G/DL — SIGNIFICANT CHANGE UP (ref 11.5–15.5)
MAGNESIUM SERPL-MCNC: 1.7 MG/DL — SIGNIFICANT CHANGE UP (ref 1.6–2.6)
MAGNESIUM SERPL-MCNC: 1.8 MG/DL — SIGNIFICANT CHANGE UP (ref 1.6–2.6)
MCHC RBC-ENTMCNC: 26.7 PG — LOW (ref 27–34)
MCHC RBC-ENTMCNC: 31.9 GM/DL — LOW (ref 32–36)
MCV RBC AUTO: 83.8 FL — SIGNIFICANT CHANGE UP (ref 80–100)
NRBC # BLD: 0 /100 WBCS — SIGNIFICANT CHANGE UP (ref 0–0)
PHOSPHATE SERPL-MCNC: 2 MG/DL — LOW (ref 2.5–4.5)
PHOSPHATE SERPL-MCNC: 2.1 MG/DL — LOW (ref 2.5–4.5)
PLATELET # BLD AUTO: 146 K/UL — LOW (ref 150–400)
POTASSIUM SERPL-MCNC: 3.5 MMOL/L — SIGNIFICANT CHANGE UP (ref 3.5–5.3)
POTASSIUM SERPL-MCNC: 3.6 MMOL/L — SIGNIFICANT CHANGE UP (ref 3.5–5.3)
POTASSIUM SERPL-SCNC: 3.5 MMOL/L — SIGNIFICANT CHANGE UP (ref 3.5–5.3)
POTASSIUM SERPL-SCNC: 3.6 MMOL/L — SIGNIFICANT CHANGE UP (ref 3.5–5.3)
PROT SERPL-MCNC: 5.3 G/DL — LOW (ref 6–8.3)
RBC # BLD: 4.64 M/UL — SIGNIFICANT CHANGE UP (ref 3.8–5.2)
RBC # FLD: 14.6 % — HIGH (ref 10.3–14.5)
SODIUM SERPL-SCNC: 138 MMOL/L — SIGNIFICANT CHANGE UP (ref 135–145)
SODIUM SERPL-SCNC: 139 MMOL/L — SIGNIFICANT CHANGE UP (ref 135–145)
WBC # BLD: 10.9 K/UL — HIGH (ref 3.8–10.5)
WBC # FLD AUTO: 10.9 K/UL — HIGH (ref 3.8–10.5)

## 2022-12-31 PROCEDURE — 70450 CT HEAD/BRAIN W/O DYE: CPT | Mod: 26

## 2022-12-31 PROCEDURE — 99233 SBSQ HOSP IP/OBS HIGH 50: CPT

## 2022-12-31 PROCEDURE — 70450 CT HEAD/BRAIN W/O DYE: CPT | Mod: 26,77

## 2022-12-31 PROCEDURE — 73560 X-RAY EXAM OF KNEE 1 OR 2: CPT | Mod: 26,LT

## 2022-12-31 RX ORDER — MAGNESIUM SULFATE 500 MG/ML
2 VIAL (ML) INJECTION ONCE
Refills: 0 | Status: COMPLETED | OUTPATIENT
Start: 2022-12-31 | End: 2022-12-31

## 2022-12-31 RX ORDER — POTASSIUM CHLORIDE 20 MEQ
20 PACKET (EA) ORAL ONCE
Refills: 0 | Status: COMPLETED | OUTPATIENT
Start: 2022-12-31 | End: 2022-12-31

## 2022-12-31 RX ORDER — SODIUM,POTASSIUM PHOSPHATES 278-250MG
1 POWDER IN PACKET (EA) ORAL ONCE
Refills: 0 | Status: COMPLETED | OUTPATIENT
Start: 2022-12-31 | End: 2022-12-31

## 2022-12-31 RX ADMIN — Medication 20 MILLIEQUIVALENT(S): at 22:14

## 2022-12-31 RX ADMIN — BRIVARACETAM 220 MILLIGRAM(S): 25 TABLET, FILM COATED ORAL at 17:57

## 2022-12-31 RX ADMIN — LATANOPROST 1 DROP(S): 0.05 SOLUTION/ DROPS OPHTHALMIC; TOPICAL at 22:14

## 2022-12-31 RX ADMIN — AMLODIPINE BESYLATE 10 MILLIGRAM(S): 2.5 TABLET ORAL at 06:12

## 2022-12-31 RX ADMIN — Medication 1 PACKET(S): at 22:14

## 2022-12-31 RX ADMIN — CYCLOSPORINE 1 DROP(S): 0.5 EMULSION OPHTHALMIC at 17:55

## 2022-12-31 RX ADMIN — POLYETHYLENE GLYCOL 3350 17 GRAM(S): 17 POWDER, FOR SOLUTION ORAL at 12:01

## 2022-12-31 RX ADMIN — BRIVARACETAM 220 MILLIGRAM(S): 25 TABLET, FILM COATED ORAL at 06:13

## 2022-12-31 RX ADMIN — ATORVASTATIN CALCIUM 20 MILLIGRAM(S): 80 TABLET, FILM COATED ORAL at 21:29

## 2022-12-31 RX ADMIN — Medication 25 GRAM(S): at 22:14

## 2022-12-31 RX ADMIN — CHLORHEXIDINE GLUCONATE 1 APPLICATION(S): 213 SOLUTION TOPICAL at 21:29

## 2022-12-31 RX ADMIN — SENNA PLUS 2 TABLET(S): 8.6 TABLET ORAL at 21:29

## 2022-12-31 RX ADMIN — CYCLOSPORINE 1 DROP(S): 0.5 EMULSION OPHTHALMIC at 05:05

## 2022-12-31 NOTE — PATIENT PROFILE ADULT - NSPROGENSOURCEINFO_GEN_A_NUR
H & P


Time Seen by Provider: 04/03/18 22:32


HPI/ROS: 





CHIEF COMPLAINT:  Rash





HISTORY OF PRESENT ILLNESS:  4 and a half year old boy presents to the 

emergency department with his mother and sister with pruritic rash.  Around 6:

00 p.m. This evening the mother noted hives.  She did not given any medication.

  He seems to be itching quite a bit at this.  He has no other URI symptoms.  

No cough.  No rhinorrhea.  No fevers or chills.  He does have a history of 

eczema and does take hydroxyzine intermittently although she did not give him 

any hydroxyzine today.  No vomiting or diarrhea.





REVIEW OF SYSTEMS:


Constitutional:  No fever, no chills.


Eyes:  No injection no discharge.


ENT:  No sore throat.  no nasal congestion


Respiratory:  No cough, no shortness of breath.


Cardiac:  No chest pain.


Gastrointestinal:  No abdominal pain, vomiting or diarrhea.


Genitourinary:  No dysuria.


Musculoskeletal:  No back pain.


Skin:  Rash as noted above..  No petechiae.


Neurological:  No headache. (Gaby Muñoz)


Past Medical/Surgical History: 


Eczema (Gaby Muñoz)


Social History: 





Lives with family in [place] (Gaby Muñoz)


Physical Exam: 





General Appearance:  The child is alert, well hydrated, appropriate and non-

toxic appearing.  Smiling, cooperative.  Mother and sister at bedside.  Sister 

speaks fluent English.


ENT, mouth:TMs are clear bilaterally, no injection, no evidence of serous 

otitis.


Throat:  There is no erythema or exudates, no tonsillar hypertrophy.


Neck:Supple, nontender, no lymphadenopathy.


Respiratory:  There are no retractions, lungs are clear to auscultation.


Cardiac:  Regular rate and rhythm, no murmurs or gallops.


Gastrointestinal:  Abdomen is soft, no masses, no apparent tenderness.


Neurological:  Alert, appropriate and interactive.  The child is moving all 

extremities and appropriate for age.


Skin:  Erythematous red, raised welts to the abdomen, chest, back and upper 

extremities.  The face is spared. (Gaby Muñoz)


Constitutional: 





 Initial Vital Signs











Temperature (C)  36.4 C L  04/03/18 21:48


 


Heart Rate  116   04/03/18 21:48


 


Respiratory Rate  26   04/03/18 21:48


 


Blood Pressure  100/61   04/03/18 21:48


 


O2 Sat (%)  98   04/03/18 21:48








 











O2 Delivery Mode               Room Air














Allergies/Adverse Reactions: 


 





No Known Allergies Allergy (Verified 01/23/18 08:47)


 








Home Medications: 














 Medication  Instructions  Recorded


 


Hydroxyzine HCl  04/03/18


 


Prednisolone Sod Phosphate 15 mg PO DAILY 2 Days  ml 04/03/18





[PrednisoLONE Oral Liquid]  














Medical Decision Making


ED Course/Re-evaluation: 





Clinically I think this patient has urticaria.  He was given 12.5 mg of 

diphenhydramine p.o. In the emergency department.  He was also given dose of 

prednisolone and Zantac.





I do not think epinephrine is indicated.  No respiratory distress or 

involvement. (Gaby Muñoz)





PHYSICIAN DOCUMENTATION:


The patient was evaluated and managed by the Physician Assistant.  My co-

signature indicates that I have reviewed this chart and I agree with the 

findings and plan of care as documented.  I am the secondary supervising 

physician.


 (Trice Vo)


Differential Diagnosis: 





Including but not limited to urticaria, contact dermatitis, anaphylaxis (Gaby Muñoz)





- Data Points


Medications Given: 





 








Discontinued Medications





Diphenhydramine HCl (Benadryl Oral Liquid)  12.5 mg PO EDNOW ONE


   Stop: 04/03/18 22:18


   Last Admin: 04/03/18 22:18 Dose:  12.5 mg


Prednisolone Sodium Phosphate (Orapred Oral Liquid)  15 mg PO EDNOW ONE


   Stop: 04/03/18 23:20


   Last Admin: 04/03/18 23:25 Dose:  15 mg


Prednisone (Prednisone)  20 mg PO EDNOW ONE


   Stop: 04/03/18 22:56


   Last Admin: 04/03/18 23:30 Dose:  Not Given


Ranitidine HCl (Zantac)  30 mg PO EDNOW ONE


   Stop: 04/04/18 22:58


   Last Admin: 04/03/18 23:30 Dose:  30 mg








Departure





- Departure


Disposition: Home, Routine, Self-Care


Clinical Impression: 


 Urticaria





Condition: Good


Instructions:  Urticaria (ED)


Additional Instructions: 


You may continue Benadryl 25 mg every 6-8 hours as needed for symptoms of 

itching.  Caution medication will make him very drowsy.


Prednisone 15 mg daily for a total of 3 days.





Referrals: 


Maura Villagran [Primary Care Provider] - 1-2 days without fail


Prescriptions: 


Prednisolone Sod Phosphate [PrednisoLONE Oral Liquid] 15 mg PO DAILY 2 Days  ml patient Statement Selected

## 2022-12-31 NOTE — PROGRESS NOTE ADULT - SUBJECTIVE AND OBJECTIVE BOX
DATE OF SERVICE: 12-31-22 @ 17:28    Patient is a 82y old  Female who presents with a chief complaint of SDH (31 Dec 2022 06:19)      INTERVAL HISTORY: no complaints     REVIEW OF SYSTEMS:  CONSTITUTIONAL: No weakness  EYES/ENT: No visual changes;  No throat pain   NECK: No pain or stiffness  RESPIRATORY: No cough, wheezing; No shortness of breath  CARDIOVASCULAR: No chest pain or palpitations  GASTROINTESTINAL: No abdominal  pain. No nausea, vomiting, or hematemesis  GENITOURINARY: No dysuria, frequency or hematuria  NEUROLOGICAL: No stroke like symptoms  SKIN: No rashes      	  MEDICATIONS:  amLODIPine   Tablet 10 milliGRAM(s) Oral daily        PHYSICAL EXAM:  T(C): 37.1 (12-31-22 @ 15:00), Max: 37.7 (12-30-22 @ 19:00)  HR: 67 (12-31-22 @ 15:00) (62 - 83)  BP: 120/65 (12-31-22 @ 15:00) (85/56 - 137/59)  RR: 10 (12-31-22 @ 15:00) (10 - 20)  SpO2: 100% (12-31-22 @ 15:00) (100% - 100%)  Wt(kg): --  I&O's Summary    30 Dec 2022 07:01  -  31 Dec 2022 07:00  --------------------------------------------------------  IN: 2781 mL / OUT: 1530 mL / NET: 1251 mL    31 Dec 2022 07:01  -  31 Dec 2022 17:28  --------------------------------------------------------  IN: 200 mL / OUT: 995 mL / NET: -795 mL          Appearance: In no distress	  HEENT:    PERRL, EOMI	  Cardiovascular:  S1 S2, No JVD  Respiratory: Lungs clear to auscultation	  Gastrointestinal:  Soft, Non-tender, + BS	  Vascularature:  No edema of LE  Psychiatric: Appropriate affect   Neuro: no acute focal deficits                               11.4   7.22  )-----------( 157      ( 30 Dec 2022 04:22 )             38.4     12-31    139  |  108  |  13  ----------------------------<  81  3.5   |  23  |  0.48<L>    Ca    8.0<L>      31 Dec 2022 05:32  Phos  2.0     12-31  Mg     1.8     12-31    TPro  5.3<L>  /  Alb  2.6<L>  /  TBili  0.5  /  DBili  x   /  AST  143<H>  /  ALT  57<H>  /  AlkPhos  69  12-31        Labs personally reviewed      ASSESSMENT/PLAN: 	  Ms. Laya Arevalo is a 81 yo female with PMH of HTN, HLD and glaucoma who presents after fall with SDH now admitted for NSCU for further management. 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     Problem/Plan -1  Problem: Cardiac Risk Stratificatin  - ECG with no ischemia noted  - Patient not in decompendated HF  - TTE grossly unremarkable  - Trop likely elevated d/t acute rhabdo secondary to fall and unknown downtime  - No hx of severe MS/AS.  - No hx of tachy steven arrhythmia  - Patient is mod risk for mod risk non-elective neurosurgery. No contraindication to proceed.  - Tolerated procedure well.     Problem/Plan -2  Problem: Elevated Trop  - ECG with no ischemia noted  - TTE unremarkable with normal LV and no WMA  - Elevated trop likely elevated d/t acute rhabdo secondary to fall and unknown downtime    Problem/Plan -3  Problem: Acute Rhabdomyolysis  - Patient s/p fall with unknown downtime  - CK upon presentation 54603 now downtrending (7407--> 4014)  - Cont to closely trend  - IVF as per NSICU    Problem/Plan -4  Problem:  Hypertension  - Amlodipine 10mg PO daily resumed  - BP wnl    Problem/Plan -5  Problem: HLD  - c/w Atorvastatin             SHAD Gonzalez DO [facility]  Cardiovascular Medicine  [address], [address]  Office: [phone]  Available via call/text on Microsoft Teams

## 2022-12-31 NOTE — PROGRESS NOTE ADULT - TIME BILLING
not critically ill but medically complex post op care as above
Data reviewed, patient seen/examined and care plan reviewed/updated with fellow. Care plan coordinated with NSGY.

## 2022-12-31 NOTE — PROGRESS NOTE ADULT - ASSESSMENT
chronic L SDH w/ 8mm MLS s/p L francis hole    NEURO:  [id number]  CTH today stable  Briviact due to thrombocytopenia for sz ppx  Pain control, tylenol as needed   1 NANDO drain to full suction, per nsg  MMAE planning per nsg (pt declined)  PT OT    PULM:   RA  Incentive spirometry  mobilize as tolerated    CV:   SBP 90-160mmHg  Cards following  ECG NSR   TTE, EF 61%, syncope workup  Amlodipine w holding parameters      RENAL:   Fluids: IVL  bladder scan/straight q6h   CK, downtrending  dc orona in am      GI:   Diet: reg diet  Bowel regimen standing, senna, miralax       ENDO:   HgA1C 6.1  TSH 2.48   Goal euglycemia (-180)  ISS    HEME/ONC:  LED  SCDs   SQL once drains out  Monitor thrombocytopenia, repeat CBC    ID:   Vanc/Zosyn given in ED for hypotension, blood cultures sent  Monitor off abx  afebrile       Dispo: ICU for SD drain chronic L SDH w/ 8mm MLS s/p L francis hole    NEURO:  [id number]  Briviact due to thrombocytopenia for sz ppx x7d  Pain control, tylenol as needed   1 NANDO drain to full suction, per nsg d2/3  MMAE declined by patient  PT OT      PULM:   RA  Incentive spirometry  mobilize as tolerated      CV:   SBP 90-160mmHg  Cards following  ECG NSR   TTE, EF 61%, syncope workup  Amlodipine       RENAL:   Fluids: IVL  bladder scan/straight q6h  CK, downtrending      GI:   Diet: reg diet  Bowel regimen standing, senna, miralax       ENDO:   HgA1C 6.1  TSH 2.48   Goal euglycemia (-180)  ISS    HEME/ONC:  LED  SCDs   SQL once drains out per NSG  Monitor thrombocytopenia, repeat CBC tonight    ID:   Monitor off abx  afebrile       Dispo: ICU for SD drain chronic L SDH w/ 8mm MLS s/p L francis hole    NEURO:  [id number]  Briviact due to thrombocytopenia for sz ppx x7d  Pain control, tylenol as needed   1 NANDO drain to full suction, per nsg d2/3  MMAE declined by patient  PT OT      PULM:   RA  Incentive spirometry  mobilize as tolerated      CV:   SBP 90-160mmHg  Cards following  ECG NSR   TTE, EF 61%, syncope workup  Amlodipine       RENAL:   Fluids: IVL  bladder scan/straight q6h  CK, downtrending  oliguria improving  dc orona      GI:   Diet: reg diet  Bowel regimen standing, senna, miralax   monitor LFTs outpatient workup      ENDO:   HgA1C 6.1  TSH 2.48   Goal euglycemia (-180)  ISS    HEME/ONC:  LED  SCDs   SQL once drains out per NSG  Monitor thrombocytopenia, repeat CBC tonight    ID:   Monitor off abx  afebrile       Dispo: ICU for SD drain chronic L SDH w/ 8mm MLS s/p L francis hole    NEURO:  [id number]  Briviact due to thrombocytopenia for sz ppx x7d  Pain control, tylenol as needed   DC drain today, post pull CTH  MMAE declined by patient  PT OT      PULM:   RA  Incentive spirometry  mobilize as tolerated      CV:   SBP 90-160mmHg  Cards following  ECG NSR   TTE, EF 61%, syncope workup  Amlodipine       RENAL:   Fluids: IVL  bladder scan/straight q6h  CK, downtrending  oliguria improving  dc orona      GI:   Diet: reg diet  Bowel regimen standing, senna, miralax   monitor LFTs outpatient workup      ENDO:   HgA1C 6.1  TSH 2.48   Goal euglycemia (-180)  ISS    HEME/ONC:  LED  SCDs   SQL once drains out per NSG  Monitor thrombocytopenia, repeat CBC tonight    ID:   Monitor off abx  afebrile       Dispo: ICU for SD drain

## 2022-12-31 NOTE — PROGRESS NOTE ADULT - ATTENDING COMMENTS
Agree with/edited as appropriate above  OR this afternoon  exam worsening, neurosurg aware  repeat CK downtrending, check repeat trops, cardiology called
82-year-old woman admitted 12/28/22 for large left SDH now POD 2 from L Mckinley hole evacuation.     SD drain d/c as output thin/CSF.    Awake, alert, fully oriented, follows, right arm drift with 4+/5 strength except HG 5/5, left leg drift    CT head post drain pull; anticipate can go to floor if CT without new heme  Pain control - monitor LFTs  SBP 90-160mmhg  PT/OT/OOB  Continue eye gtt
Agree with/edited as appropriate above
Agree with/edited as appropriate above  OR this afternoon  exam worsening, neurosurg aware  repeat CK downtrending, check repeat trops, cardiology called

## 2022-12-31 NOTE — CHART NOTE - NSCHARTNOTEFT_GEN_A_CORE
patient flat in bed.  esther drain taken off suction and removed with no difficulties.  3 staples placed at drain exit site.  patient tolerated well with no immediate complications.

## 2022-12-31 NOTE — PROGRESS NOTE ADULT - ASSESSMENT
chronic L SDH w/ 8mm MLS s/p L francis hole    NEURO:  [id number]  Briviact due to thrombocytopenia for sz ppx x7d  Pain control, tylenol as needed   DC drain today, post pull CTH stable, w small tract heme, will repeat cth  MMAE declined by patient  PT OT      PULM:   RA  Incentive spirometry  mobilize as tolerated      CV:   SBP 90-160mmHg  Cards following  ECG NSR   TTE, EF 61%, syncope workup  Amlodipine       RENAL:   Fluids: IVL  bladder scan/straight q6h  CK, downtrending  oliguria improving  dc orona      GI:   Diet: reg diet  Bowel regimen standing, senna, miralax   monitor LFTs outpatient workup      ENDO:   HgA1C 6.1  TSH 2.48   Goal euglycemia (-180)  ISS    HEME/ONC:  LED  SCDs   SQL once drains out per NSG  Monitor thrombocytopenia, repeat CBC tonight    ID:   Monitor off abx  afebrile       Dispo: floor if cth stable

## 2022-12-31 NOTE — PROGRESS NOTE ADULT - SUBJECTIVE AND OBJECTIVE BOX
NSCU Progress Note    Assessment/Hospital Course:    82F on ASA, hx HTN, HLD, glaucoma, fully independent, who was found down for unknown duration LKW 12/27 PM, found to have soiled herself, presented w hypotension to ED,  CTH: largely chronic L SDH (approx 2cm) w/ 8mm MLS, admitted to NSICU for ongoing management.     24 Hour Events/Subjective:  - POD 1 L francis holes for SDH evac  - hypoglycemic, started on D5 1/2 NS while NPO and given 1amp Dextrose  - CTH with post op changes, some residual and pneumocephalus  - SD drain output 60cc/shift today    REVIEW OF SYSTEMS: [] Unable to Assess due to neurologic exam   [x ] All ROS addressed below are non-contributory, except:  Neuro: [x ] Headache [ ] Back pain [ ] Numbness [ ] Weakness [ ] Ataxia [ ] Dizziness [ ] Aphasia [ ] Dysarthria [ ] Visual disturbance  Resp: [ ] Shortness of breath/dyspnea [ ] Orthopnea [ ] Cough  CV: [ ] Chest pain [ ] Palpitation [ ] Lightheadedness [ ] Syncope  Renal: [ ] Thirst [ ] Edema  GI: [ ] Nausea [ ] Emesis [ ] Abdominal pain [ ] Constipation [ ] Diarrhea  Hem: [ ] Hematemesis [ ] bBright red blood per rectum  ID: [ ] Fever [ ] Chills [ ] Dysuria  ENT: [ ] Rhinorrhea    VITALS:   - Reviewed    IMAGING/DATA:   - Reviewed    PHYSICAL EXAM:    General: calm  CVS: RRR  Pulm: CTAB  GI: Soft, NTND  Extremities: No LE Edema  Neuro: Awake, Ox3, no facial, 3mm reactive b/l, follows commands, RUE 4+/5 L side 5/5  NSCU Progress Note    Assessment/Hospital Course:    82F on ASA, hx HTN, HLD, glaucoma, fully independent, who was found down for unknown duration LKW 12/27 PM, found to have soiled herself, presented w hypotension to ED,  CTH: largely chronic L SDH (approx 2cm) w/ 8mm MLS, admitted to NSICU for ongoing management.     24 Hour Events/Subjective:  - POD 2 L francis holes for SDH evac  - SD drain output 240cc output 24h          REVIEW OF SYSTEMS: [] Unable to Assess due to neurologic exam   [x ] All ROS addressed below are non-contributory, except:  Neuro: [x ] Headache [ ] Back pain [ ] Numbness [ ] Weakness [ ] Ataxia [ ] Dizziness [ ] Aphasia [ ] Dysarthria [ ] Visual disturbance  Resp: [ ] Shortness of breath/dyspnea [ ] Orthopnea [ ] Cough  CV: [ ] Chest pain [ ] Palpitation [ ] Lightheadedness [ ] Syncope  Renal: [ ] Thirst [ ] Edema  GI: [ ] Nausea [ ] Emesis [ ] Abdominal pain [ ] Constipation [ ] Diarrhea  Hem: [ ] Hematemesis [ ] bBright red blood per rectum  ID: [ ] Fever [ ] Chills [ ] Dysuria  ENT: [ ] Rhinorrhea    VITALS:   - Reviewed    IMAGING/DATA:   - Reviewed    PHYSICAL EXAM:    General: calm  CVS: RRR  Pulm: CTAB  GI: Soft, NTND  Extremities: No LE Edema  Neuro: Awake, Ox3, no facial, 3mm reactive b/l, follows commands, RUE 4+/5 L side 5/5

## 2022-12-31 NOTE — PROGRESS NOTE ADULT - SUBJECTIVE AND OBJECTIVE BOX
NSCU Progress Note    Assessment/Hospital Course:    82F on ASA, hx HTN, HLD, glaucoma, fully independent, who was found down for unknown duration LKW 12/27 PM, found to have soiled herself, presented w hypotension to ED,  CTH: largely chronic L SDH (approx 2cm) w/ 8mm MLS, admitted to NSICU for ongoing management.     24 Hour Events/Subjective:  - POD 2 L francis holes for SDH evac        REVIEW OF SYSTEMS: [] Unable to Assess due to neurologic exam   [x ] All ROS addressed below are non-contributory, except:  Neuro: [x ] Headache [ ] Back pain [ ] Numbness [ ] Weakness [ ] Ataxia [ ] Dizziness [ ] Aphasia [ ] Dysarthria [ ] Visual disturbance  Resp: [ ] Shortness of breath/dyspnea [ ] Orthopnea [ ] Cough  CV: [ ] Chest pain [ ] Palpitation [ ] Lightheadedness [ ] Syncope  Renal: [ ] Thirst [ ] Edema  GI: [ ] Nausea [ ] Emesis [ ] Abdominal pain [ ] Constipation [ ] Diarrhea  Hem: [ ] Hematemesis [ ] bBright red blood per rectum  ID: [ ] Fever [ ] Chills [ ] Dysuria  ENT: [ ] Rhinorrhea    VITALS:   - Reviewed    IMAGING/DATA:   - Reviewed    PHYSICAL EXAM:    General: calm  CVS: RRR  Pulm: CTAB  GI: Soft, NTND  Extremities: No LE Edema  Neuro: Awake, Ox3, no facial, 3mm reactive b/l, follows commands, RUE 4+/5 L side 5/5

## 2023-01-01 DIAGNOSIS — I10 ESSENTIAL (PRIMARY) HYPERTENSION: ICD-10-CM

## 2023-01-01 DIAGNOSIS — Z29.9 ENCOUNTER FOR PROPHYLACTIC MEASURES, UNSPECIFIED: ICD-10-CM

## 2023-01-01 DIAGNOSIS — M62.82 RHABDOMYOLYSIS: ICD-10-CM

## 2023-01-01 DIAGNOSIS — H40.9 UNSPECIFIED GLAUCOMA: ICD-10-CM

## 2023-01-01 DIAGNOSIS — E78.5 HYPERLIPIDEMIA, UNSPECIFIED: ICD-10-CM

## 2023-01-01 DIAGNOSIS — Z98.890 OTHER SPECIFIED POSTPROCEDURAL STATES: ICD-10-CM

## 2023-01-01 DIAGNOSIS — S06.5X9A TRAUMATIC SUBDURAL HEMORRHAGE WITH LOSS OF CONSCIOUSNESS OF UNSPECIFIED DURATION, INITIAL ENCOUNTER: ICD-10-CM

## 2023-01-01 LAB — CK SERPL-CCNC: 983 U/L — HIGH (ref 25–170)

## 2023-01-01 PROCEDURE — 99223 1ST HOSP IP/OBS HIGH 75: CPT

## 2023-01-01 RX ORDER — POLYETHYLENE GLYCOL 3350 17 G/17G
17 POWDER, FOR SOLUTION ORAL
Refills: 0 | Status: DISCONTINUED | OUTPATIENT
Start: 2023-01-01 | End: 2023-01-04

## 2023-01-01 RX ORDER — CYCLOSPORINE 0.5 MG/ML
1 EMULSION OPHTHALMIC
Qty: 0 | Refills: 0 | DISCHARGE

## 2023-01-01 RX ORDER — TRAVOPROST 0.04 MG/ML
1 SOLUTION/ DROPS OPHTHALMIC
Qty: 0 | Refills: 0 | DISCHARGE

## 2023-01-01 RX ORDER — ENOXAPARIN SODIUM 100 MG/ML
40 INJECTION SUBCUTANEOUS
Refills: 0 | Status: DISCONTINUED | OUTPATIENT
Start: 2023-01-01 | End: 2023-01-04

## 2023-01-01 RX ADMIN — BRIVARACETAM 220 MILLIGRAM(S): 25 TABLET, FILM COATED ORAL at 17:39

## 2023-01-01 RX ADMIN — ATORVASTATIN CALCIUM 20 MILLIGRAM(S): 80 TABLET, FILM COATED ORAL at 21:35

## 2023-01-01 RX ADMIN — BRIVARACETAM 220 MILLIGRAM(S): 25 TABLET, FILM COATED ORAL at 05:57

## 2023-01-01 RX ADMIN — ENOXAPARIN SODIUM 40 MILLIGRAM(S): 100 INJECTION SUBCUTANEOUS at 18:00

## 2023-01-01 RX ADMIN — AMLODIPINE BESYLATE 10 MILLIGRAM(S): 2.5 TABLET ORAL at 05:56

## 2023-01-01 RX ADMIN — SENNA PLUS 2 TABLET(S): 8.6 TABLET ORAL at 21:35

## 2023-01-01 RX ADMIN — CYCLOSPORINE 1 DROP(S): 0.5 EMULSION OPHTHALMIC at 17:42

## 2023-01-01 RX ADMIN — LATANOPROST 1 DROP(S): 0.05 SOLUTION/ DROPS OPHTHALMIC; TOPICAL at 21:35

## 2023-01-01 RX ADMIN — POLYETHYLENE GLYCOL 3350 17 GRAM(S): 17 POWDER, FOR SOLUTION ORAL at 17:43

## 2023-01-01 RX ADMIN — CYCLOSPORINE 1 DROP(S): 0.5 EMULSION OPHTHALMIC at 05:56

## 2023-01-01 NOTE — PROGRESS NOTE ADULT - ASSESSMENT
chronic L SDH w/ 8mm MLS s/p L francis hole    NEURO:  [id number]  Briviact due to thrombocytopenia for sz ppx x7d  Pain control, tylenol as needed    post pull CTH stablex2, w small tract heme  MMAE declined by patient  PT OT      PULM:   RA  Incentive spirometry  mobilize as tolerated      CV:   SBP 90-160mmHg  Cards following  ECG NSR   TTE, EF 61%, syncope workup  Amlodipine       RENAL:   Fluids: IVL  bladder scan/straight q6h  CK downtrending  oliguria improving  dc orona      GI:   Diet: reg diet  Bowel regimen standing, senna, miralax   monitor LFTs outpatient workup      ENDO:   HgA1C 6.1  TSH 2.48   Goal euglycemia (-180)  ISS    HEME/ONC:  LED neg 12/29  SCDs   SQL     ID:   Monitor off abx  afebrile       Dispo: floor

## 2023-01-01 NOTE — CONSULT NOTE ADULT - CONVERSATION DETAILS
Discussed prognosis and goals of care with patient's only daughter Aisha ([phone]). Patient's  passed away 03/2022. Aisha is by [facility] law the surrogate decision maker. Pt and Aisha have previously discussed limitations to medical care. If the patient had a reversible medical condition with a reasonable chance of recovery, the pt would want all measures pursued. Otherwise, the patient would not want to pursue "heroic measures" such as resuscitation or intubation. Pt would not want to be kept alive indefinitely on a ventilator.

## 2023-01-01 NOTE — CONSULT NOTE ADULT - PROBLEM SELECTOR RECOMMENDATION 9
Left SDH s/p francis hole 12/29  - seizure ppx (Briviact) x 7 days  - pain control w/ tylenol  - PT/OT  - hold ASA

## 2023-01-01 NOTE — PROGRESS NOTE ADULT - SUBJECTIVE AND OBJECTIVE BOX
SUBJECTIVE:   Patient was seen and evaluated at bedside. Patient is resting in bed and is in no new acute distress.   OVERNIGHT EVENTS:   none   Vital Signs Last 24 Hrs  T(C): 37 (01 Jan 2023 08:49), Max: 37.1 (31 Dec 2022 15:00)  T(F): 98.6 (01 Jan 2023 08:49), Max: 98.8 (31 Dec 2022 15:00)  HR: 75 (01 Jan 2023 08:49) (60 - 79)  BP: 115/68 (01 Jan 2023 08:49) (107/66 - 149/85)  BP(mean): 84 (01 Jan 2023 02:00) (70 - 109)  RR: 18 (01 Jan 2023 08:49) (10 - 20)  SpO2: 98% (01 Jan 2023 08:49) (98% - 100%)    Parameters below as of 01 Jan 2023 08:49  Patient On (Oxygen Delivery Method): room air        PHYSICAL EXAM:    General: No Acute Distress     Neurological: Awake, alert oriented to person, place and month , Following Commands, PERRL, EOMI, Face Symmetrical, Speech Fluent, Moving all extremities, Muscle Strength normal in all four extremities, No Drift, Sensation to Light Touch Intact    Pulmonary: Clear to Auscultation, No Rales, No Rhonchi, No Wheezes     Cardiovascular: S1, S2, Regular Rate and Rhythm     Gastrointestinal: Soft, Nontender, Nondistended     Incision: clean and dry     LABS:                        12.4   10.90 )-----------( 146      ( 31 Dec 2022 20:46 )             38.9    12-31    138  |  103  |  11  ----------------------------<  106<H>  3.6   |  28  |  0.52    Ca    8.1<L>      31 Dec 2022 20:46  Phos  2.1     12-31  Mg     1.7     12-31    TPro  5.3<L>  /  Alb  2.6<L>  /  TBili  0.5  /  DBili  x   /  AST  143<H>  /  ALT  57<H>  /  AlkPhos  69  12-31 12-31 @ 07:01  -  01-01 @ 07:00  --------------------------------------------------------  IN: 650 mL / OUT: 2195 mL / NET: -1545 mL      DRAINS:     MEDICATIONS:  Antibiotics:    Neuro:  acetaminophen     Tablet .. 650 milliGRAM(s) Oral every 6 hours PRN Temp greater or equal to 38C (100.4F), Mild Pain (1 - 3)  brivaracetam  IVPB 50 milliGRAM(s) IV Intermittent two times a day  ondansetron Injectable 4 milliGRAM(s) IV Push every 6 hours PRN Nausea and/or Vomiting    Cardiac:  amLODIPine   Tablet 10 milliGRAM(s) Oral daily    Pulm:    GI/:  polyethylene glycol 3350 17 Gram(s) Oral daily  senna 2 Tablet(s) Oral at bedtime    Other:   atorvastatin 20 milliGRAM(s) Oral at bedtime  cycloSPORINE (RESTASIS) 0.05% Emulsion 1 Drop(s) Both EYES two times a day  latanoprost 0.005% Ophthalmic Solution 1 Drop(s) Both EYES at bedtime    DIET: [] Regular [] CCD [] Renal [] Puree [] Dysphagia [] Tube Feeds:   regular   IMAGING:   ACC: [id number] EXAM:  CT BRAIN                          PROCEDURE DATE:  12/31/2022          INTERPRETATION:  History: Status post removal of NANDO drain status post   subdural hematoma evacuation.    Description: A noncontrast head CT was performed.    Comparison is made to a prior CT study from 12/30/2022.    A left parietal calvarial francis hole is again noted.    There has been interval removal of the left-sided subdural drainage   catheter since the prior CT. Small tubular areas of hemorrhage are noted   along the prior catheter tract.    Evolving postoperative changes are again noted status post evacuation of   left frontal parietal temporal subdural hematoma. Mostly low density   subdural fluid and small areas of subdural hemorrhage are similar in   overall extent compared to the prior study. Postoperative subdural air   persists within the collection.    The left to right midline shift and ventricular size are grossly stable.    There is no evidence for acute infarct or brain parenchymal hemorrhage.   Chronic white matter changes and generalized cerebral volume loss are   again noted.    IMPRESSION:    Status post removal of left-sided subdural drainage catheter. The   residual postoperative left-sided subdural collection is overall similar   in size compared to the 12/30/2022 CT.    --- End of Report ---            NASREEN PARIS MD; Attending Radiologist  This document has been electronically signed. Dec 31 2022  6:30PM

## 2023-01-01 NOTE — PROVIDER CONTACT NOTE (OTHER) - ASSESSMENT
Pt is alert and oriented x 4 but is generally confused at times, VSS, Pupils are 3RB, Patient not complaing of any pain or discomfort. LUE drift, alll other extremities no drift, Pt states she feels touching sensation slightly less of left leg

## 2023-01-01 NOTE — CONSULT NOTE ADULT - SUBJECTIVE AND OBJECTIVE BOX
Sebastien Snow MD  [facility] Division of Hospital Medicine  Available via MS Teams      HPI:  82F on ASA (last took 1-2d ago), hx HTN, HLD, glaucoma. Fall yest evening or this AM (pt doesn’t remember). CTH: largely chronic L SDH (approx 2cm) w/ 8mm MLS. Exam: AOx2, PERRL, EOMI, no facial, slight R drift, LOWERY at least 4+/5.     S/p L francis hole and evacuation of hematoma. Downgraded to floors today. Pt seen and examined at bedside. Pt states she feels well, denies any headache, chest pain, sob. She states she feels subjectively weaker on her left leg rather than right.     ICU Vital Signs Last 24 Hrs  T(C): 36.1 (28 Dec 2022 19:28), Max: 36.4 (28 Dec 2022 19:02)  T(F): 96.9 (28 Dec 2022 19:28), Max: 97.5 (28 Dec 2022 19:02)  HR: 82 (28 Dec 2022 19:28) (70 - 82)  BP: 94/61 (28 Dec 2022 19:28) (92/63 - 94/61)  RR: 14 (28 Dec 2022 19:28) (14 - 20)  SpO2: 95% (28 Dec 2022 19:28) (95% - 99%)    O2 Parameters below as of 28 Dec 2022 19:28  Patient On (Oxygen Delivery Method): room air                          16.0   15.24 )-----------( 216      ( 28 Dec 2022 19:41 )             50.1   12-28    139  |  99  |  27<H>  ----------------------------<  140<H>  4.7   |  22  |  0.77    Ca    9.6      28 Dec 2022 19:41  Phos  4.3     12-28  Mg     2.2     12-28    TPro  7.7  /  Alb  3.9  /  TBili  0.8  /  DBili  x   /  AST  426<H>  /  ALT  112<H>  /  AlkPhos  111  12-28   (28 Dec 2022 22:52)      PAST MEDICAL & SURGICAL HISTORY:  HLD (hyperlipidemia)      HTN (hypertension)      S/P hysterectomy      Benign teratoma          Review of Systems:   CONSTITUTIONAL: No fever, chills  HEENT: No sore throat, vision changes  RESPIRATORY: No cough, wheezing, shortness of breath  CARDIOVASCULAR: No chest pain, palpitations, leg edema  GASTROINTESTINAL: No abdominal pain, nausea, vomiting, diarrhea or constipation. No melena or hematochezia.  GENITOURINARY: No dysuria, frequency, hematuria  NEUROLOGICAL: +generalized weakness, left leg worse than right. No headaches, memory loss, numbness, or tremors  SKIN: No itching, burning, rashes, or lesions   MUSCULOSKELETAL: No joint pain or swelling; No muscle, back, or extremity pain  PSYCHIATRIC: No depression, anxiety  HEME/LYMPH: No easy bruising, or bleeding gums      Allergies    hydrochlorothiazide (Unknown)    Intolerances        Social History:     FAMILY HISTORY:  FH of HTN in mom and dad        MEDICATIONS  (STANDING):  amLODIPine   Tablet 10 milliGRAM(s) Oral daily  atorvastatin 20 milliGRAM(s) Oral at bedtime  brivaracetam  IVPB 50 milliGRAM(s) IV Intermittent two times a day  cycloSPORINE (RESTASIS) 0.05% Emulsion 1 Drop(s) Both EYES two times a day  latanoprost 0.005% Ophthalmic Solution 1 Drop(s) Both EYES at bedtime  polyethylene glycol 3350 17 Gram(s) Oral two times a day  senna 2 Tablet(s) Oral at bedtime    MEDICATIONS  (PRN):  acetaminophen     Tablet .. 650 milliGRAM(s) Oral every 6 hours PRN Temp greater or equal to 38C (100.4F), Mild Pain (1 - 3)  ondansetron Injectable 4 milliGRAM(s) IV Push every 6 hours PRN Nausea and/or Vomiting    Home Medications:  aspirin 81 mg oral delayed release tablet: 1 tab(s) orally once a day (01 Jan 2023 13:18)  Norvasc 10 mg oral tablet: 1 tab(s) orally once a day (01 Jan 2023 13:18)  Restasis 0.05% ophthalmic emulsion: 1 drop(s) to each affected eye every 12 hours (01 Jan 2023 13:18)  travoprost 0.004% ophthalmic solution: 1 drop(s) to each affected eye once a day (in the evening) (01 Jan 2023 13:18)      CAPILLARY BLOOD GLUCOSE      I&O's Summary    31 Dec 2022 07:01  -  01 Jan 2023 07:00  --------------------------------------------------------  IN: 650 mL / OUT: 2195 mL / NET: -1545 mL      Physical Exam:  Vital Signs Last 24 Hrs  T(C): 37 (01 Jan 2023 08:49), Max: 37.1 (31 Dec 2022 15:00)  T(F): 98.6 (01 Jan 2023 08:49), Max: 98.8 (31 Dec 2022 15:00)  HR: 75 (01 Jan 2023 08:49) (60 - 79)  BP: 115/68 (01 Jan 2023 08:49) (107/66 - 149/85)  BP(mean): 84 (01 Jan 2023 02:00) (70 - 109)  RR: 18 (01 Jan 2023 08:49) (10 - 20)  SpO2: 98% (01 Jan 2023 08:49) (98% - 100%)    Parameters below as of 01 Jan 2023 08:49  Patient On (Oxygen Delivery Method): room air        CONSTITUTIONAL: NAD, sluggish speech  HEENT: left head staples in place  RESPIRATORY: Normal respiratory effort; lungs are clear to auscultation bilaterally  CARDIOVASCULAR: normal S1 and S2, no murmur/rub/gallop; No lower extremity edema  ABDOMEN: Nontender to palpation, normoactive bowel sounds, no rebound/guarding; No hepatosplenomegaly  MUSCULOSKELETAL: no clubbing or cyanosis of digits; no joint swelling or tenderness to palpation  PSYCH: A+O to person, "hospital", and "Jan 1 2022"; affect appropriate  NEUROLOGY: appears to have word finding difficulty, CN 2-12 are intact and symmetric; RLE 3+/5 strength, LLE 4/5  SKIN: No rashes; no palpable lesions    LABS:                        12.4   10.90 )-----------( 146      ( 31 Dec 2022 20:46 )             38.9     12-31    138  |  103  |  11  ----------------------------<  106<H>  3.6   |  28  |  0.52    Ca    8.1<L>      31 Dec 2022 20:46  Phos  2.1     12-31  Mg     1.7     12-31    TPro  5.3<L>  /  Alb  2.6<L>  /  TBili  0.5  /  DBili  x   /  AST  143<H>  /  ALT  57<H>  /  AlkPhos  69  12-31      CARDIAC MARKERS ( 01 Jan 2023 01:52 )  x     / x     / 983 U/L / x     / x      CARDIAC MARKERS ( 31 Dec 2022 12:36 )  x     / x     / 1384 U/L / x     / x      CARDIAC MARKERS ( 30 Dec 2022 17:23 )  x     / x     / 2854 U/L / x     / x                RADIOLOGY & ADDITIONAL TESTS:  Results Reviewed:   Imaging Personally Reviewed:  Electrocardiogram Personally Reviewed:    COORDINATION OF CARE:  Care Discussed with Consultants/Other Providers [Y/N]: daughter  Prior or Outpatient Records Reviewed [Y/N]:

## 2023-01-01 NOTE — PROGRESS NOTE ADULT - SUBJECTIVE AND OBJECTIVE BOX
NSCU Progress Note    Assessment/Hospital Course:    82F on ASA, hx HTN, HLD, glaucoma, fully independent, who was found down for unknown duration LKW 12/27 PM, found to have soiled herself, presented w hypotension to ED,  CTH: largely chronic L SDH (approx 2cm) w/ 8mm MLS, admitted to NSICU for ongoing management.     24 Hour Events/Subjective:  - POD 3 L francis holes for SDH evac        REVIEW OF SYSTEMS: [] Unable to Assess due to neurologic exam   [x ] All ROS addressed below are non-contributory, except:  Neuro: [x ] Headache [ ] Back pain [ ] Numbness [ ] Weakness [ ] Ataxia [ ] Dizziness [ ] Aphasia [ ] Dysarthria [ ] Visual disturbance  Resp: [ ] Shortness of breath/dyspnea [ ] Orthopnea [ ] Cough  CV: [ ] Chest pain [ ] Palpitation [ ] Lightheadedness [ ] Syncope  Renal: [ ] Thirst [ ] Edema  GI: [ ] Nausea [ ] Emesis [ ] Abdominal pain [ ] Constipation [ ] Diarrhea  Hem: [ ] Hematemesis [ ] bBright red blood per rectum  ID: [ ] Fever [ ] Chills [ ] Dysuria  ENT: [ ] Rhinorrhea    VITALS:   - Reviewed    IMAGING/DATA:   - Reviewed    PHYSICAL EXAM:    General: calm  CVS: RRR  Pulm: CTAB  GI: Soft, NTND  Extremities: No LE Edema  Neuro: Awake, Ox3, no facial, 3mm reactive b/l, follows commands, RUE 4+/5 L side 5/5

## 2023-01-01 NOTE — PROGRESS NOTE ADULT - SUBJECTIVE AND OBJECTIVE BOX
DATE OF SERVICE: 01-01-23 @ 10:00    Patient is a 82y old  Female who presents with a chief complaint of SDH (01 Jan 2023 02:07)      INTERVAL HISTORY: no complaints     REVIEW OF SYSTEMS:  CONSTITUTIONAL: No weakness  EYES/ENT: No visual changes;  No throat pain   NECK: No pain or stiffness  RESPIRATORY: No cough, wheezing; No shortness of breath  CARDIOVASCULAR: No chest pain or palpitations  GASTROINTESTINAL: No abdominal  pain. No nausea, vomiting, or hematemesis  GENITOURINARY: No dysuria, frequency or hematuria  NEUROLOGICAL: No stroke like symptoms  SKIN: No rashes    	  MEDICATIONS:  amLODIPine   Tablet 10 milliGRAM(s) Oral daily        PHYSICAL EXAM:  T(C): 37 (01-01-23 @ 08:49), Max: 37.1 (12-31-22 @ 15:00)  HR: 75 (01-01-23 @ 08:49) (60 - 79)  BP: 115/68 (01-01-23 @ 08:49) (107/66 - 149/85)  RR: 18 (01-01-23 @ 08:49) (10 - 20)  SpO2: 98% (01-01-23 @ 08:49) (98% - 100%)  Wt(kg): --  I&O's Summary    31 Dec 2022 07:01  -  01 Jan 2023 07:00  --------------------------------------------------------  IN: 650 mL / OUT: 2195 mL / NET: -1545 mL          Appearance: In no distress	  HEENT:    PERRL, EOMI	  Cardiovascular:  S1 S2, No JVD  Respiratory: Lungs clear to auscultation	  Gastrointestinal:  Soft, Non-tender, + BS	  Vascularature:  No edema of LE  Psychiatric: Appropriate affect   Neuro: no acute focal deficits                               12.4   10.90 )-----------( 146      ( 31 Dec 2022 20:46 )             38.9     12-31    138  |  103  |  11  ----------------------------<  106<H>  3.6   |  28  |  0.52    Ca    8.1<L>      31 Dec 2022 20:46  Phos  2.1     12-31  Mg     1.7     12-31    TPro  5.3<L>  /  Alb  2.6<L>  /  TBili  0.5  /  DBili  x   /  AST  143<H>  /  ALT  57<H>  /  AlkPhos  69  12-31        Labs personally reviewed      ASSESSMENT/PLAN: 	  Ms. Laya Arevalo is a 81 yo female with PMH of HTN, HLD and glaucoma who presents after fall with SDH now admitted for NSCU for further management. 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     Problem/Plan -1  Problem: Cardiac Risk Stratificatin  - ECG with no ischemia noted  - Patient not in decompendated HF  - TTE grossly unremarkable  - Trop likely elevated d/t acute rhabdo secondary to fall and unknown downtime  - No hx of severe MS/AS.  - No hx of tachy steven arrhythmia  - Patient is mod risk for mod risk non-elective neurosurgery. No contraindication to proceed.  - Tolerated procedure well.     Problem/Plan -2  Problem: Elevated Trop  - ECG with no ischemia noted  - TTE unremarkable with normal LV and no WMA  - Elevated trop likely elevated d/t acute rhabdo secondary to fall and unknown downtime    Problem/Plan -3  Problem: Acute Rhabdomyolysis  - Patient s/p fall with unknown downtime  - CK upon presentation 47868 now downtrending (7407--> 4014)  - Cont to closely trend  -1/1  s/p IVF     Problem/Plan -4  Problem:  Hypertension  - Amlodipine 10mg PO daily resumed  - BP wnl    Problem/Plan -5  Problem: HLD  - c/w Atorvastatin                 SHAD Gonzalez DO [facility]  Cardiovascular Medicine  [address], [address]  Office: [phone]  Available via call/text on Microsoft Teams

## 2023-01-02 LAB
ANION GAP SERPL CALC-SCNC: 9 MMOL/L — SIGNIFICANT CHANGE UP (ref 5–17)
BUN SERPL-MCNC: 11 MG/DL — SIGNIFICANT CHANGE UP (ref 7–23)
CALCIUM SERPL-MCNC: 8.3 MG/DL — LOW (ref 8.4–10.5)
CHLORIDE SERPL-SCNC: 104 MMOL/L — SIGNIFICANT CHANGE UP (ref 96–108)
CK SERPL-CCNC: 499 U/L — HIGH (ref 25–170)
CO2 SERPL-SCNC: 27 MMOL/L — SIGNIFICANT CHANGE UP (ref 22–31)
CREAT SERPL-MCNC: 0.59 MG/DL — SIGNIFICANT CHANGE UP (ref 0.5–1.3)
EGFR: 90 ML/MIN/1.73M2 — SIGNIFICANT CHANGE UP
GLUCOSE SERPL-MCNC: 73 MG/DL — SIGNIFICANT CHANGE UP (ref 70–99)
HCT VFR BLD CALC: 33.5 % — LOW (ref 34.5–45)
HGB BLD-MCNC: 10.5 G/DL — LOW (ref 11.5–15.5)
MAGNESIUM SERPL-MCNC: 1.8 MG/DL — SIGNIFICANT CHANGE UP (ref 1.6–2.6)
MCHC RBC-ENTMCNC: 26.3 PG — LOW (ref 27–34)
MCHC RBC-ENTMCNC: 31.3 GM/DL — LOW (ref 32–36)
MCV RBC AUTO: 84 FL — SIGNIFICANT CHANGE UP (ref 80–100)
NRBC # BLD: 0 /100 WBCS — SIGNIFICANT CHANGE UP (ref 0–0)
PHOSPHATE SERPL-MCNC: 3.2 MG/DL — SIGNIFICANT CHANGE UP (ref 2.5–4.5)
PLATELET # BLD AUTO: 165 K/UL — SIGNIFICANT CHANGE UP (ref 150–400)
POTASSIUM SERPL-MCNC: 3.6 MMOL/L — SIGNIFICANT CHANGE UP (ref 3.5–5.3)
POTASSIUM SERPL-SCNC: 3.6 MMOL/L — SIGNIFICANT CHANGE UP (ref 3.5–5.3)
RBC # BLD: 3.99 M/UL — SIGNIFICANT CHANGE UP (ref 3.8–5.2)
RBC # FLD: 14.6 % — HIGH (ref 10.3–14.5)
SODIUM SERPL-SCNC: 140 MMOL/L — SIGNIFICANT CHANGE UP (ref 135–145)
WBC # BLD: 9.93 K/UL — SIGNIFICANT CHANGE UP (ref 3.8–10.5)
WBC # FLD AUTO: 9.93 K/UL — SIGNIFICANT CHANGE UP (ref 3.8–10.5)

## 2023-01-02 RX ADMIN — ATORVASTATIN CALCIUM 20 MILLIGRAM(S): 80 TABLET, FILM COATED ORAL at 21:10

## 2023-01-02 RX ADMIN — AMLODIPINE BESYLATE 10 MILLIGRAM(S): 2.5 TABLET ORAL at 05:40

## 2023-01-02 RX ADMIN — LATANOPROST 1 DROP(S): 0.05 SOLUTION/ DROPS OPHTHALMIC; TOPICAL at 21:14

## 2023-01-02 RX ADMIN — SENNA PLUS 2 TABLET(S): 8.6 TABLET ORAL at 21:10

## 2023-01-02 RX ADMIN — POLYETHYLENE GLYCOL 3350 17 GRAM(S): 17 POWDER, FOR SOLUTION ORAL at 17:15

## 2023-01-02 RX ADMIN — ENOXAPARIN SODIUM 40 MILLIGRAM(S): 100 INJECTION SUBCUTANEOUS at 17:12

## 2023-01-02 RX ADMIN — CYCLOSPORINE 1 DROP(S): 0.5 EMULSION OPHTHALMIC at 17:11

## 2023-01-02 RX ADMIN — CYCLOSPORINE 1 DROP(S): 0.5 EMULSION OPHTHALMIC at 05:40

## 2023-01-02 RX ADMIN — BRIVARACETAM 220 MILLIGRAM(S): 25 TABLET, FILM COATED ORAL at 05:39

## 2023-01-02 RX ADMIN — BRIVARACETAM 220 MILLIGRAM(S): 25 TABLET, FILM COATED ORAL at 17:16

## 2023-01-02 NOTE — PROGRESS NOTE ADULT - SUBJECTIVE AND OBJECTIVE BOX
SUBJECTIVE:  Patient seen / examined eating breakfast this am.    OVERNIGHT EVENTS:      Vital Signs Last 24 Hrs  T(C): 36.5 (02 Jan 2023 08:38), Max: 37.3 (01 Jan 2023 16:54)  T(F): 97.7 (02 Jan 2023 08:38), Max: 99.2 (01 Jan 2023 16:54)  HR: 80 (02 Jan 2023 08:38) (67 - 100)  BP: 100/60 (02 Jan 2023 08:38) (90/52 - 111/64)  BP(mean): --  RR: 18 (02 Jan 2023 08:38) (18 - 20)  SpO2: 97% (02 Jan 2023 08:38) (97% - 98%)    Parameters below as of 02 Jan 2023 08:38  Patient On (Oxygen Delivery Method): room air      PHYSICAL EXAM:    General: No Acute Distress     Neurological: Awake, alert oriented to person, place and month , Following Commands, PERRL, EOMI, Face Symmetrical, Speech Fluent, Moving all extremities, Muscle Strength normal in all four extremities, No Drift, Sensation to Light Touch Intact    Pulmonary: Clear to Auscultation, No Rales, No Rhonchi, No Wheezes     Cardiovascular: S1, S2, Regular Rate and Rhythm     Gastrointestinal: Soft, Nontender, Nondistended     Incision: clean and dry     LABS:                                   10.5   9.93  )-----------( 165      ( 02 Jan 2023 05:44 )             33.5     01-02    140  |  104  |  11  ----------------------------<  73  3.6   |  27  |  0.59    Ca    8.3<L>      02 Jan 2023 05:45  Phos  3.2     01-02  Mg     1.8     01-02      MEDICATIONS  (STANDING):  amLODIPine   Tablet 10 milliGRAM(s) Oral daily  atorvastatin 20 milliGRAM(s) Oral at bedtime  brivaracetam  IVPB 50 milliGRAM(s) IV Intermittent two times a day  cycloSPORINE (RESTASIS) 0.05% Emulsion 1 Drop(s) Both EYES two times a day  enoxaparin Injectable 40 milliGRAM(s) SubCutaneous <User Schedule>  latanoprost 0.005% Ophthalmic Solution 1 Drop(s) Both EYES at bedtime  polyethylene glycol 3350 17 Gram(s) Oral two times a day  senna 2 Tablet(s) Oral at bedtime    MEDICATIONS  (PRN):  acetaminophen     Tablet .. 650 milliGRAM(s) Oral every 6 hours PRN Temp greater or equal to 38C (100.4F), Mild Pain (1 - 3)  ondansetron Injectable 4 milliGRAM(s) IV Push every 6 hours PRN Nausea and/or Vomiting      IMAGING:   ACC: [id number] EXAM:  CT BRAIN                          PROCEDURE DATE:  12/31/2022          INTERPRETATION:  History: Status post removal of NANDO drain status post   subdural hematoma evacuation.    Description: A noncontrast head CT was performed.    Comparison is made to a prior CT study from 12/30/2022.    A left parietal calvarial francis hole is again noted.    There has been interval removal of the left-sided subdural drainage   catheter since the prior CT. Small tubular areas of hemorrhage are noted   along the prior catheter tract.    Evolving postoperative changes are again noted status post evacuation of   left frontal parietal temporal subdural hematoma. Mostly low density   subdural fluid and small areas of subdural hemorrhage are similar in   overall extent compared to the prior study. Postoperative subdural air   persists within the collection.    The left to right midline shift and ventricular size are grossly stable.    There is no evidence for acute infarct or brain parenchymal hemorrhage.   Chronic white matter changes and generalized cerebral volume loss are   again noted.    IMPRESSION:    Status post removal of left-sided subdural drainage catheter. The   residual postoperative left-sided subdural collection is overall similar   in size compared to the 12/30/2022 CT.    --- End of Report ---            NASREEN PARIS MD; Attending Radiologist  This document has been electronically signed. Dec 31 2022  6:30PM

## 2023-01-02 NOTE — PROGRESS NOTE ADULT - ASSESSMENT
82F on ASA (last took 1-2d ago), hx HTN, HLD, glaucoma. Fall yest evening or this AM (pt doesn’t remember). CTH: largely chronic L SDH (approx 2cm) w/ 8mm MLS. Exam: AOx2, PERRL, EOMI, no facial, slight R drift, LOWERY at least 4+/5.    NOW POD #4 s/p left francis hole and subdural hematoma evacuation on 12/29     PLAN:  Neuro:   Cont briviact for seizure ppx  Pain control  PT/OT - AR pending    Cards:  -160  Cont amlodipine for HTN  Cont Lipitor for HLD  TTE 12/29    Pulm:  Incentive spirometry as tolerated  RA sats >92%    Renal:  IVL  Voiding  Replete electrolytes PRN    GI:  Tolerating diet  Bowel regimen    Endo:  Maintain Euglycemia    ID:  Afebrile  No leukocytosis    Heme:  DVT PPX: SCDs, SQL    Dispo:  PT/OT Rec: AR pending, PMR pending    Discussed with patient and family wound care, follow up plans, activity, and medications. Questions answered, and they verbalized understanding. Time Spent: 25 min  dw Dr López      -will discuss with Dr. López   -spectra [phone]   misc - continue xalatan and cyclosporine eye drop

## 2023-01-02 NOTE — PROGRESS NOTE ADULT - SUBJECTIVE AND OBJECTIVE BOX
DATE OF SERVICE: 01-02-23 @ 11:37    Patient is a 82y old  Female who presents with a chief complaint of SDH (02 Jan 2023 11:14)      INTERVAL HISTORY: Feels ok.     REVIEW OF SYSTEMS:  CONSTITUTIONAL: No weakness  EYES/ENT: No visual changes;  No throat pain   NECK: No pain or stiffness  RESPIRATORY: No cough, wheezing; No shortness of breath  CARDIOVASCULAR: No chest pain or palpitations  GASTROINTESTINAL: No abdominal  pain. No nausea, vomiting, or hematemesis  GENITOURINARY: No dysuria, frequency or hematuria  NEUROLOGICAL: No stroke like symptoms  SKIN: No rashes    	  MEDICATIONS:  amLODIPine   Tablet 10 milliGRAM(s) Oral daily        PHYSICAL EXAM:  T(C): 36.5 (01-02-23 @ 08:38), Max: 37.3 (01-01-23 @ 16:54)  HR: 80 (01-02-23 @ 08:38) (67 - 100)  BP: 100/60 (01-02-23 @ 08:38) (90/52 - 111/64)  RR: 18 (01-02-23 @ 08:38) (18 - 20)  SpO2: 97% (01-02-23 @ 08:38) (97% - 98%)  Wt(kg): --  I&O's Summary    01 Jan 2023 07:01  -  02 Jan 2023 07:00  --------------------------------------------------------  IN: 0 mL / OUT: 1800 mL / NET: -1800 mL    02 Jan 2023 07:01  -  02 Jan 2023 11:37  --------------------------------------------------------  IN: 0 mL / OUT: 300 mL / NET: -300 mL          Appearance: In no distress	  HEENT:    PERRL, EOMI	  Cardiovascular:  S1 S2, No JVD  Respiratory: Lungs clear to auscultation	  Gastrointestinal:  Soft, Non-tender, + BS	  Vascularature:  No edema of LE  Psychiatric: Appropriate affect   Neuro: no acute focal deficits                               10.5   9.93  )-----------( 165      ( 02 Jan 2023 05:44 )             33.5     01-02    140  |  104  |  11  ----------------------------<  73  3.6   |  27  |  0.59    Ca    8.3<L>      02 Jan 2023 05:45  Phos  3.2     01-02  Mg     1.8     01-02          Labs personally reviewed      ASSESSMENT/PLAN: 	    Ms. Laya Arevalo is a 83 yo female with PMH of HTN, HLD and glaucoma who presents after fall with SDH now admitted for NSCU for further management. 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     Problem/Plan -1  Problem: Cardiac Risk Stratificatin  - ECG with no ischemia noted  - Patient not in decompendated HF  - TTE grossly unremarkable  - Trop likely elevated d/t acute rhabdo secondary to fall and unknown downtime  - No hx of severe MS/AS.  - No hx of tachy steven arrhythmia  - Patient is mod risk for mod risk non-elective neurosurgery. No contraindication to proceed.  - Tolerated procedure well.     Problem/Plan -2  Problem: Elevated Trop  - ECG with no ischemia noted  - TTE unremarkable with normal LV and no WMA  - Elevated trop likely elevated d/t acute rhabdo secondary to fall and unknown downtime    Problem/Plan -3  Problem: Acute Rhabdomyolysis  - Patient s/p fall with unknown downtime  - CK upon presentation 94062 now downtrending (7407--> 499)  - Cont to closely trend    Problem/Plan -4  Problem:  Hypertension  - BP downtrending and now soft  - Will consider reducing Amlodipine to 5mg PO daily    Problem/Plan -5  Problem: HLD  - c/w Atorvastatin         Gemma Perez, AG-NP   Nathan Jones DO [facility]  Cardiovascular Medicine  [address], [address]  Available through call or text on [employer] TE[employer]  Office: [phone]

## 2023-01-03 DIAGNOSIS — R50.9 FEVER, UNSPECIFIED: ICD-10-CM

## 2023-01-03 DIAGNOSIS — S06.5XAA TRAUMATIC SUBDURAL HEMORRHAGE WITH LOSS OF CONSCIOUSNESS STATUS UNKNOWN, INITIAL ENCOUNTER: ICD-10-CM

## 2023-01-03 LAB
ALBUMIN SERPL ELPH-MCNC: 2.6 G/DL — LOW (ref 3.3–5)
ALP SERPL-CCNC: 65 U/L — SIGNIFICANT CHANGE UP (ref 40–120)
ALT FLD-CCNC: 39 U/L — SIGNIFICANT CHANGE UP (ref 10–45)
ANION GAP SERPL CALC-SCNC: 7 MMOL/L — SIGNIFICANT CHANGE UP (ref 5–17)
AST SERPL-CCNC: 57 U/L — HIGH (ref 10–40)
BILIRUB SERPL-MCNC: 0.5 MG/DL — SIGNIFICANT CHANGE UP (ref 0.2–1.2)
BUN SERPL-MCNC: 12 MG/DL — SIGNIFICANT CHANGE UP (ref 7–23)
CALCIUM SERPL-MCNC: 8.2 MG/DL — LOW (ref 8.4–10.5)
CHLORIDE SERPL-SCNC: 103 MMOL/L — SIGNIFICANT CHANGE UP (ref 96–108)
CK SERPL-CCNC: 296 U/L — HIGH (ref 25–170)
CO2 SERPL-SCNC: 27 MMOL/L — SIGNIFICANT CHANGE UP (ref 22–31)
CREAT SERPL-MCNC: 0.57 MG/DL — SIGNIFICANT CHANGE UP (ref 0.5–1.3)
CULTURE RESULTS: SIGNIFICANT CHANGE UP
CULTURE RESULTS: SIGNIFICANT CHANGE UP
EGFR: 91 ML/MIN/1.73M2 — SIGNIFICANT CHANGE UP
GLUCOSE SERPL-MCNC: 98 MG/DL — SIGNIFICANT CHANGE UP (ref 70–99)
POTASSIUM SERPL-MCNC: 3.7 MMOL/L — SIGNIFICANT CHANGE UP (ref 3.5–5.3)
POTASSIUM SERPL-SCNC: 3.7 MMOL/L — SIGNIFICANT CHANGE UP (ref 3.5–5.3)
PROT SERPL-MCNC: 5.9 G/DL — LOW (ref 6–8.3)
SARS-COV-2 RNA SPEC QL NAA+PROBE: SIGNIFICANT CHANGE UP
SODIUM SERPL-SCNC: 137 MMOL/L — SIGNIFICANT CHANGE UP (ref 135–145)
SPECIMEN SOURCE: SIGNIFICANT CHANGE UP
SPECIMEN SOURCE: SIGNIFICANT CHANGE UP

## 2023-01-03 PROCEDURE — 99222 1ST HOSP IP/OBS MODERATE 55: CPT

## 2023-01-03 PROCEDURE — 99233 SBSQ HOSP IP/OBS HIGH 50: CPT

## 2023-01-03 RX ORDER — AMLODIPINE BESYLATE 2.5 MG/1
5 TABLET ORAL DAILY
Refills: 0 | Status: DISCONTINUED | OUTPATIENT
Start: 2023-01-04 | End: 2023-01-04

## 2023-01-03 RX ORDER — SODIUM CHLORIDE 9 MG/ML
500 INJECTION INTRAMUSCULAR; INTRAVENOUS; SUBCUTANEOUS ONCE
Refills: 0 | Status: COMPLETED | OUTPATIENT
Start: 2023-01-03 | End: 2023-01-03

## 2023-01-03 RX ADMIN — ENOXAPARIN SODIUM 40 MILLIGRAM(S): 100 INJECTION SUBCUTANEOUS at 17:21

## 2023-01-03 RX ADMIN — CYCLOSPORINE 1 DROP(S): 0.5 EMULSION OPHTHALMIC at 06:04

## 2023-01-03 RX ADMIN — LATANOPROST 1 DROP(S): 0.05 SOLUTION/ DROPS OPHTHALMIC; TOPICAL at 21:28

## 2023-01-03 RX ADMIN — ATORVASTATIN CALCIUM 20 MILLIGRAM(S): 80 TABLET, FILM COATED ORAL at 21:28

## 2023-01-03 RX ADMIN — BRIVARACETAM 220 MILLIGRAM(S): 25 TABLET, FILM COATED ORAL at 17:23

## 2023-01-03 RX ADMIN — POLYETHYLENE GLYCOL 3350 17 GRAM(S): 17 POWDER, FOR SOLUTION ORAL at 06:03

## 2023-01-03 RX ADMIN — Medication 650 MILLIGRAM(S): at 02:17

## 2023-01-03 RX ADMIN — CYCLOSPORINE 1 DROP(S): 0.5 EMULSION OPHTHALMIC at 18:50

## 2023-01-03 RX ADMIN — Medication 650 MILLIGRAM(S): at 02:47

## 2023-01-03 RX ADMIN — SENNA PLUS 2 TABLET(S): 8.6 TABLET ORAL at 21:28

## 2023-01-03 RX ADMIN — POLYETHYLENE GLYCOL 3350 17 GRAM(S): 17 POWDER, FOR SOLUTION ORAL at 17:21

## 2023-01-03 RX ADMIN — BRIVARACETAM 220 MILLIGRAM(S): 25 TABLET, FILM COATED ORAL at 06:04

## 2023-01-03 RX ADMIN — SODIUM CHLORIDE 500 MILLILITER(S): 9 INJECTION INTRAMUSCULAR; INTRAVENOUS; SUBCUTANEOUS at 13:22

## 2023-01-03 NOTE — PROGRESS NOTE ADULT - ASSESSMENT
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     Fever 100.7 overnight. Currently in bed, feels well, denies pain, dyspnea or cough. Able to move all extremities.

## 2023-01-03 NOTE — PROGRESS NOTE ADULT - SUBJECTIVE AND OBJECTIVE BOX
Patient is a 82y old  Female who presents with a chief complaint of SDH (03 Jan 2023 10:13)      SUBJECTIVE / OVERNIGHT EVENTS: Pt in bed, feels well. Fever 100.7 overnight.     MEDICATIONS  (STANDING):  amLODIPine   Tablet 10 milliGRAM(s) Oral daily  atorvastatin 20 milliGRAM(s) Oral at bedtime  brivaracetam  IVPB 50 milliGRAM(s) IV Intermittent two times a day  cycloSPORINE (RESTASIS) 0.05% Emulsion 1 Drop(s) Both EYES two times a day  enoxaparin Injectable 40 milliGRAM(s) SubCutaneous <User Schedule>  latanoprost 0.005% Ophthalmic Solution 1 Drop(s) Both EYES at bedtime  polyethylene glycol 3350 17 Gram(s) Oral two times a day  senna 2 Tablet(s) Oral at bedtime    MEDICATIONS  (PRN):  acetaminophen     Tablet .. 650 milliGRAM(s) Oral every 6 hours PRN Temp greater or equal to 38C (100.4F), Mild Pain (1 - 3)  ondansetron Injectable 4 milliGRAM(s) IV Push every 6 hours PRN Nausea and/or Vomiting      CAPILLARY BLOOD GLUCOSE        I&O's Summary    02 Jan 2023 07:01  -  03 Jan 2023 07:00  --------------------------------------------------------  IN: 720 mL / OUT: 2150 mL / NET: -1430 mL    03 Jan 2023 07:01  -  03 Jan 2023 11:24  --------------------------------------------------------  IN: 240 mL / OUT: 0 mL / NET: 240 mL        PHYSICAL EXAM:  T(C): 36.8 (01-03-23 @ 09:00), Max: 38.2 (01-03-23 @ 01:00)  HR: 79 (01-03-23 @ 09:00) (69 - 82)  BP: 97/60 (01-03-23 @ 09:00) (96/80 - 108/66)  RR: 18 (01-03-23 @ 09:00) (18 - 20)  SpO2: 95% (01-03-23 @ 09:00) (95% - 97%)  CONSTITUTIONAL: NAD, well-developed, well-groomed  EYES: PERRLA; conjunctiva and sclera clear  ENMT: Moist oral mucosa, no pharyngeal injection or exudates; normal dentition  NECK: Supple, no palpable masses; no thyromegaly  RESPIRATORY: Normal respiratory effort; lungs are clear to auscultation bilaterally  CARDIOVASCULAR: Regular rate and rhythm, normal S1 and S2, no murmur/rub/gallop; No lower extremity edema; Peripheral pulses are 2+ bilaterally  ABDOMEN: Nontender to palpation, normoactive bowel sounds, no rebound/guarding; No hepatosplenomegaly  MUSCULOSKELETAL:  No clubbing or cyanosis of digits; no joint swelling or tenderness to palpation  PSYCH: A+O to person, place, and time; affect appropriate  NEUROLOGY: CN 2-12 are intact and symmetric; moving all extremities  SKIN: No rashes; no palpable lesions    LABS:                        10.5   9.93  )-----------( 165      ( 02 Jan 2023 05:44 )             33.5     01-03    137  |  103  |  12  ----------------------------<  98  3.7   |  27  |  0.57    Ca    8.2<L>      03 Jan 2023 06:17  Phos  3.2     01-02  Mg     1.8     01-02    TPro  5.9<L>  /  Alb  2.6<L>  /  TBili  0.5  /  DBili  x   /  AST  57<H>  /  ALT  39  /  AlkPhos  65  01-03      CARDIAC MARKERS ( 03 Jan 2023 06:17 )  x     / x     / 296 U/L / x     / x      CARDIAC MARKERS ( 02 Jan 2023 05:45 )  x     / x     / 499 U/L / x     / x              RADIOLOGY & ADDITIONAL TESTS:    Imaging Personally Reviewed:    Consultant(s) Notes Reviewed:      Care Discussed with Consultants/Other Providers: Nsx

## 2023-01-03 NOTE — PROGRESS NOTE ADULT - SUBJECTIVE AND OBJECTIVE BOX
SUBJECTIVE:  Patient seen this morning, no acute complaints.  Denies urinary freq/burning. Chills, SOB.     OVERNIGHT EVENTS:  low grade fever, no ssx. Will monitor today.     Vital Signs Last 24 Hrs  T(C): 36.8 (03 Jan 2023 09:00), Max: 38.2 (03 Jan 2023 01:00)  T(F): 98.2 (03 Jan 2023 09:00), Max: 100.7 (03 Jan 2023 01:00)  HR: 79 (03 Jan 2023 09:00) (69 - 82)  BP: 97/60 (03 Jan 2023 09:00) (96/80 - 108/66)  BP(mean): --  RR: 18 (03 Jan 2023 09:00) (18 - 20)  SpO2: 95% (03 Jan 2023 09:00) (95% - 97%)    Parameters below as of 03 Jan 2023 09:00  Patient On (Oxygen Delivery Method): room air          PHYSICAL EXAM:    General: No Acute Distress     Neurological: Awake, alert oriented to person, place and month , Following Commands, PERRL, EOMI, Face Symmetrical, Speech Fluent, Moving all extremities, Muscle Strength normal in all four extremities, No Drift, Sensation to Light Touch Intact    Pulmonary: Clear to Auscultation, No Rales, No Rhonchi, No Wheezes     Cardiovascular: S1, S2, Regular Rate and Rhythm     Gastrointestinal: Soft, Nontender, Nondistended     Incision: clean and dry     LABS:                                   10.5   9.93  )-----------( 165      ( 02 Jan 2023 05:44 )             33.5     01-03    137  |  103  |  12  ----------------------------<  98  3.7   |  27  |  0.57    Ca    8.2<L>      03 Jan 2023 06:17  Phos  3.2     01-02  Mg     1.8     01-02    TPro  5.9<L>  /  Alb  2.6<L>  /  TBili  0.5  /  DBili  x   /  AST  57<H>  /  ALT  39  /  AlkPhos  65  01-03        MEDICATIONS  (STANDING):  amLODIPine   Tablet 10 milliGRAM(s) Oral daily  atorvastatin 20 milliGRAM(s) Oral at bedtime  brivaracetam  IVPB 50 milliGRAM(s) IV Intermittent two times a day  cycloSPORINE (RESTASIS) 0.05% Emulsion 1 Drop(s) Both EYES two times a day  enoxaparin Injectable 40 milliGRAM(s) SubCutaneous <User Schedule>  latanoprost 0.005% Ophthalmic Solution 1 Drop(s) Both EYES at bedtime  polyethylene glycol 3350 17 Gram(s) Oral two times a day  senna 2 Tablet(s) Oral at bedtime    MEDICATIONS  (PRN):  acetaminophen     Tablet .. 650 milliGRAM(s) Oral every 6 hours PRN Temp greater or equal to 38C (100.4F), Mild Pain (1 - 3)  ondansetron Injectable 4 milliGRAM(s) IV Push every 6 hours PRN Nausea and/or Vomiting      IMAGING:   ACC: [id number] EXAM:  CT BRAIN                          PROCEDURE DATE:  12/31/2022          INTERPRETATION:  History: Status post removal of NANDO drain status post   subdural hematoma evacuation.    Description: A noncontrast head CT was performed.    Comparison is made to a prior CT study from 12/30/2022.    A left parietal calvarial francis hole is again noted.    There has been interval removal of the left-sided subdural drainage   catheter since the prior CT. Small tubular areas of hemorrhage are noted   along the prior catheter tract.    Evolving postoperative changes are again noted status post evacuation of   left frontal parietal temporal subdural hematoma. Mostly low density   subdural fluid and small areas of subdural hemorrhage are similar in   overall extent compared to the prior study. Postoperative subdural air   persists within the collection.    The left to right midline shift and ventricular size are grossly stable.    There is no evidence for acute infarct or brain parenchymal hemorrhage.   Chronic white matter changes and generalized cerebral volume loss are   again noted.    IMPRESSION:    Status post removal of left-sided subdural drainage catheter. The   residual postoperative left-sided subdural collection is overall similar   in size compared to the 12/30/2022 CT.    --- End of Report ---            NASREEN PARIS MD; Attending Radiologist  This document has been electronically signed. Dec 31 2022  6:30PM   SUBJECTIVE:  Patient seen this morning, no acute complaints.  Denies urinary freq/burning. Chills, SOB.     OVERNIGHT EVENTS:  low grade fever, no ssx. Will monitor today.     Vital Signs Last 24 Hrs  T(C): 36.8 (03 Jan 2023 09:00), Max: 38.2 (03 Jan 2023 01:00)  T(F): 98.2 (03 Jan 2023 09:00), Max: 100.7 (03 Jan 2023 01:00)  HR: 79 (03 Jan 2023 09:00) (69 - 82)  BP: 97/60 (03 Jan 2023 09:00) (96/80 - 108/66)  BP(mean): --  RR: 18 (03 Jan 2023 09:00) (18 - 20)  SpO2: 95% (03 Jan 2023 09:00) (95% - 97%)    Parameters below as of 03 Jan 2023 09:00  Patient On (Oxygen Delivery Method): room air      PHYSICAL EXAM:    General: No Acute Distress     Neurological: Awake, alert oriented to person, place and month , Following Commands, PERRL, EOMI, Face Symmetrical, Speech Fluent, Moving all extremities, Muscle Strength normal in all four extremities, No Drift, Sensation to Light Touch Intact    Pulmonary: Clear to Auscultation, No Rales, No Rhonchi, No Wheezes     Cardiovascular: S1, S2, Regular Rate and Rhythm     Gastrointestinal: Soft, Nontender, Nondistended     Incision: clean and dry     LABS:                                   10.5   9.93  )-----------( 165      ( 02 Jan 2023 05:44 )             33.5     01-03    137  |  103  |  12  ----------------------------<  98  3.7   |  27  |  0.57    Ca    8.2<L>      03 Jan 2023 06:17  Phos  3.2     01-02  Mg     1.8     01-02    TPro  5.9<L>  /  Alb  2.6<L>  /  TBili  0.5  /  DBili  x   /  AST  57<H>  /  ALT  39  /  AlkPhos  65  01-03        MEDICATIONS  (STANDING):  amLODIPine   Tablet 10 milliGRAM(s) Oral daily  atorvastatin 20 milliGRAM(s) Oral at bedtime  brivaracetam  IVPB 50 milliGRAM(s) IV Intermittent two times a day  cycloSPORINE (RESTASIS) 0.05% Emulsion 1 Drop(s) Both EYES two times a day  enoxaparin Injectable 40 milliGRAM(s) SubCutaneous <User Schedule>  latanoprost 0.005% Ophthalmic Solution 1 Drop(s) Both EYES at bedtime  polyethylene glycol 3350 17 Gram(s) Oral two times a day  senna 2 Tablet(s) Oral at bedtime    MEDICATIONS  (PRN):  acetaminophen     Tablet .. 650 milliGRAM(s) Oral every 6 hours PRN Temp greater or equal to 38C (100.4F), Mild Pain (1 - 3)  ondansetron Injectable 4 milliGRAM(s) IV Push every 6 hours PRN Nausea and/or Vomiting      IMAGING:   ACC: [id number] EXAM:  CT BRAIN                          PROCEDURE DATE:  12/31/2022          INTERPRETATION:  History: Status post removal of NANDO drain status post   subdural hematoma evacuation.    Description: A noncontrast head CT was performed.    Comparison is made to a prior CT study from 12/30/2022.    A left parietal calvarial francis hole is again noted.    There has been interval removal of the left-sided subdural drainage   catheter since the prior CT. Small tubular areas of hemorrhage are noted   along the prior catheter tract.    Evolving postoperative changes are again noted status post evacuation of   left frontal parietal temporal subdural hematoma. Mostly low density   subdural fluid and small areas of subdural hemorrhage are similar in   overall extent compared to the prior study. Postoperative subdural air   persists within the collection.    The left to right midline shift and ventricular size are grossly stable.    There is no evidence for acute infarct or brain parenchymal hemorrhage.   Chronic white matter changes and generalized cerebral volume loss are   again noted.    IMPRESSION:    Status post removal of left-sided subdural drainage catheter. The   residual postoperative left-sided subdural collection is overall similar   in size compared to the 12/30/2022 CT.    --- End of Report ---            NASREEN PARIS MD; Attending Radiologist  This document has been electronically signed. Dec 31 2022  6:30PM

## 2023-01-03 NOTE — PROVIDER CONTACT NOTE (OTHER) - RECOMMENDATIONS
Notify the provider, Take off blankets, apply cold packs, give prn Tylenol & reassess temperature using a rectal temperature.
Assess patient

## 2023-01-03 NOTE — PROGRESS NOTE ADULT - SUBJECTIVE AND OBJECTIVE BOX
DATE OF SERVICE: 01-03-23 @ 14:19    Patient is a 82y old  Female who presents with a chief complaint of SDH (03 Jan 2023 11:38)      INTERVAL HISTORY: Feels ok.     REVIEW OF SYSTEMS:  CONSTITUTIONAL: No weakness  EYES/ENT: No visual changes;  No throat pain   NECK: No pain or stiffness  RESPIRATORY: No cough, wheezing; No shortness of breath  CARDIOVASCULAR: No chest pain or palpitations  GASTROINTESTINAL: No abdominal  pain. No nausea, vomiting, or hematemesis  GENITOURINARY: No dysuria, frequency or hematuria  NEUROLOGICAL: No stroke like symptoms  SKIN: No rashes    	  MEDICATIONS:        PHYSICAL EXAM:  T(C): 37.1 (01-03-23 @ 12:49), Max: 38.2 (01-03-23 @ 01:00)  HR: 82 (01-03-23 @ 12:49) (69 - 82)  BP: 108/62 (01-03-23 @ 12:49) (96/80 - 108/66)  RR: 19 (01-03-23 @ 12:49) (18 - 19)  SpO2: 96% (01-03-23 @ 12:49) (95% - 97%)  Wt(kg): --  I&O's Summary    02 Jan 2023 07:01  -  03 Jan 2023 07:00  --------------------------------------------------------  IN: 720 mL / OUT: 2150 mL / NET: -1430 mL    03 Jan 2023 07:01  -  03 Jan 2023 14:19  --------------------------------------------------------  IN: 1340 mL / OUT: 650 mL / NET: 690 mL          Appearance: In no distress	  HEENT:    PERRL, EOMI	  Cardiovascular:  S1 S2, No JVD  Respiratory: Lungs clear to auscultation	  Gastrointestinal:  Soft, Non-tender, + BS	  Vascularature:  No edema of LE  Psychiatric: Appropriate affect   Neuro: no acute focal deficits                               10.5   9.93  )-----------( 165      ( 02 Jan 2023 05:44 )             33.5     01-03    137  |  103  |  12  ----------------------------<  98  3.7   |  27  |  0.57    Ca    8.2<L>      03 Jan 2023 06:17  Phos  3.2     01-02  Mg     1.8     01-02    TPro  5.9<L>  /  Alb  2.6<L>  /  TBili  0.5  /  DBili  x   /  AST  57<H>  /  ALT  39  /  AlkPhos  65  01-03        Labs personally reviewed      ASSESSMENT/PLAN: 	      Ms. Laya Arevalo is a 83 yo female with PMH of HTN, HLD and glaucoma who presents after fall with SDH now admitted for NSCU for further management. 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     Problem/Plan -1  Problem: Cardiac Risk Stratificatin  - ECG with no ischemia noted  - Patient not in decompendated HF  - TTE grossly unremarkable  - Trop likely elevated d/t acute rhabdo secondary to fall and unknown downtime  - No hx of severe MS/AS.  - No hx of tachy steven arrhythmia  - Patient is mod risk for mod risk non-elective neurosurgery. No contraindication to proceed.  - Tolerated procedure well.     Problem/Plan -2  Problem: Elevated Trop  - ECG with no ischemia noted  - TTE unremarkable with normal LV and no WMA  - Elevated trop likely elevated d/t acute rhabdo secondary to fall and unknown downtime    Problem/Plan -3  Problem: Acute Rhabdomyolysis  - Patient s/p fall with unknown downtime  - CK upon presentation 80776 now downtrending (7407--> 499)  - Cont to closely trend    Problem/Plan -4  Problem:  Hypertension  - BP downtrending and now soft  - Amlodipine reduced to 5mg PO daily    Problem/Plan -5  Problem: HLD  - c/w Atorvastatin         Gemma Perez, JANENE-MINDY Jones DO [facility]  Cardiovascular Medicine  [address], [address]  Available through call or text on [employer] TE[employer]  Office: [phone]   DATE OF SERVICE: 01-03-23 @ 14:19    Patient is a 82y old  Female who presents with a chief complaint of SDH (03 Jan 2023 11:38)      INTERVAL HISTORY: Feels ok.     REVIEW OF SYSTEMS:  CONSTITUTIONAL: No weakness  EYES/ENT: No visual changes;  No throat pain   NECK: No pain or stiffness  RESPIRATORY: No cough, wheezing; No shortness of breath  CARDIOVASCULAR: No chest pain or palpitations  GASTROINTESTINAL: No abdominal  pain. No nausea, vomiting, or hematemesis  GENITOURINARY: No dysuria, frequency or hematuria  NEUROLOGICAL: No stroke like symptoms  SKIN: No rashes    	  MEDICATIONS:        PHYSICAL EXAM:  T(C): 37.1 (01-03-23 @ 12:49), Max: 38.2 (01-03-23 @ 01:00)  HR: 82 (01-03-23 @ 12:49) (69 - 82)  BP: 108/62 (01-03-23 @ 12:49) (96/80 - 108/66)  RR: 19 (01-03-23 @ 12:49) (18 - 19)  SpO2: 96% (01-03-23 @ 12:49) (95% - 97%)  Wt(kg): --  I&O's Summary    02 Jan 2023 07:01  -  03 Jan 2023 07:00  --------------------------------------------------------  IN: 720 mL / OUT: 2150 mL / NET: -1430 mL    03 Jan 2023 07:01  -  03 Jan 2023 14:19  --------------------------------------------------------  IN: 1340 mL / OUT: 650 mL / NET: 690 mL          Appearance: In no distress	  HEENT:    PERRL, EOMI	  Cardiovascular:  S1 S2, No JVD  Respiratory: Lungs clear to auscultation	  Gastrointestinal:  Soft, Non-tender, + BS	  Vascularature:  No edema of LE  Psychiatric: Appropriate affect   Neuro: no acute focal deficits                               10.5   9.93  )-----------( 165      ( 02 Jan 2023 05:44 )             33.5     01-03    137  |  103  |  12  ----------------------------<  98  3.7   |  27  |  0.57    Ca    8.2<L>      03 Jan 2023 06:17  Phos  3.2     01-02  Mg     1.8     01-02    TPro  5.9<L>  /  Alb  2.6<L>  /  TBili  0.5  /  DBili  x   /  AST  57<H>  /  ALT  39  /  AlkPhos  65  01-03        Labs personally reviewed      ASSESSMENT/PLAN: 	      Ms. Laya Arevalo is a 83 yo female with PMH of HTN, HLD and glaucoma who presents after fall with SDH now admitted for NSCU for further management. 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     Problem/Plan -1  Problem: Cardiac Risk Stratificatin  - ECG with no ischemia noted  - Patient not in decompendated HF  - TTE grossly unremarkable  - Trop likely elevated d/t acute rhabdo secondary to fall and unknown downtime  - No hx of severe MS/AS.  - No hx of tachy steven arrhythmia  - Patient is mod risk for mod risk non-elective neurosurgery. No contraindication to proceed.  - Tolerated procedure well.     Problem/Plan -2  Problem: Elevated Trop  - ECG with no ischemia noted  - TTE unremarkable with normal LV and no WMA  - Elevated trop likely elevated d/t acute rhabdo secondary to fall and unknown downtime    Problem/Plan -3  Problem: Acute Rhabdomyolysis  - Patient s/p fall with unknown downtime  - CK upon presentation 46566 now downtrending (7407--> 499)  - Cont to closely trend    Problem/Plan -4  Problem:  Hypertension  - BP downtrending and now soft  - Amlodipine reduced to 5mg PO daily    Problem/Plan -5  Problem: HLD  - c/w Atorvastatin         Gemma Perez, JANENE-MINDY Jones DO [facility]  Cardiovascular Medicine  [address], [address]  Available through call or text on [employer] TE[employer]  Office: [phone]

## 2023-01-03 NOTE — PROVIDER CONTACT NOTE (OTHER) - ASSESSMENT
Patient is A&Ox4. Patient doesn't complain of pain or discomfort. Patient have 2 blankets on them. All other vitals were stable/ in parameters.

## 2023-01-03 NOTE — PROVIDER CONTACT NOTE (OTHER) - SITUATION
Pt has a slight Left arm drift, and upon assessing sensation patient stated she felt touch less on Left leg
Oral Temperature of 100.7

## 2023-01-03 NOTE — PROGRESS NOTE ADULT - ASSESSMENT
82F on ASA (last took 1-2d ago), hx HTN, HLD, glaucoma. Fall yest evening or this AM (pt doesn’t remember). CTH: largely chronic L SDH (approx 2cm) w/ 8mm MLS. Exam: AOx2, PERRL, EOMI, no facial, slight R drift, LOWERY at least 4+/5.    NOW POD #4 s/p left francis hole and subdural hematoma evacuation on 12/29     PLAN:  Neuro:   Cont briviact for seizure ppx  Pain control  PT/OT - AR pending    Cards:  -160  Cont amlodipine for HTN  Cont Lipitor for HLD  TTE 12/29    Pulm:  Incentive spirometry as tolerated  RA sats >92%    Renal:  IVL  Voiding  Replete electrolytes PRN    GI:  Tolerating diet  Bowel regimen    Endo:  Maintain Euglycemia    ID:  Afebrile  No leukocytosis    Heme:  DVT PPX: SCDs, SQL    Dispo:  PT/OT Rec: AR pending, PMR pending    Discussed with patient and family wound care, follow up plans, activity, and medications. Questions answered, and they verbalized understanding. Time Spent: 25 min  dw Dr López      -will discuss with Dr. López   -spectra [phone]   misc - continue xalatan and cyclosporine eye drop    82F on ASA (last took 1-2d ago), hx HTN, HLD, glaucoma. Fall yest evening or this AM (pt doesn’t remember). CTH: largely chronic L SDH (approx 2cm) w/ 8mm MLS. Exam: AOx2, PERRL, EOMI, no facial, slight R drift, LOWERY at least 4+/5.    NOW POD #4 s/p left francis hole and subdural hematoma evacuation on 12/29     PLAN:  Neuro:   Cont briviact for seizure ppx  Pain control  PT/OT - AR pending, PMR pending    Cards:  -160  Cont amlodipine for HTN  Cont Lipitor for HLD  TTE 12/29    Pulm:  Incentive spirometry as tolerated  RA sats >92%    Renal:  IVL  Voiding  Replete electrolytes PRN    GI:  Tolerating diet  Bowel regimen    Endo:  Maintain Euglycemia    ID:  Low grade fever last night, 100.7  No ssx of infection, No leukocytosis.  No urinary symptoms, no respiratory symptoms.  Will monitor for fever. Covid pending.       Heme:  DVT PPX: SCDs, SQL    Dispo:  PT/OT Rec: AR pending, PMR pending    Discussed with patient and family wound care, follow up plans, activity, and medications. Questions answered, and they verbalized understanding. Time Spent: 25 min  dw Dr Walker

## 2023-01-03 NOTE — PROVIDER CONTACT NOTE (OTHER) - BACKGROUND
Patient is s/p L francis hole, Pt was found by family on 12/28 on the ground. Bought into the ED and CT showed pt to have large chronic L SDH (2 cm) with 8mm MLS. PMH of HTN, HLD & glaucoma.
Pt is a 82 yr old female who presented after being found down, CTH showed L SDH, patient is s/p L Mckinley hole for evac

## 2023-01-03 NOTE — CONSULT NOTE ADULT - SUBJECTIVE AND OBJECTIVE BOX
Patient is a 82y old  Female who presents with a chief complaint of SDH (03 Jan 2023 10:13)      Admission HPI:  82F on ASA (last took 1-2d ago), hx HTN, HLD, glaucoma. Fall yest evening or this AM (pt doesn’t remember). CTH: largely chronic L SDH (approx 2cm) w/ 8mm MLS. Exam: AOx2, PERRL, EOMI, no facial, slight R drift, LOWERY at least 4+/5.    Interval History:  Patient s/p burrhole on 12/29.  Most recent imaging:  CT Head No Cont (12.31.22 @ 23:45) >  Status post removal of left-sided subdural drainage catheter with stable   mildly hemorrhagic left convexity extra-axial fluid and air as compared   to prior study.           REVIEW OF SYSTEMS: No chest pain, shortness of breath, nausea, vomiting or diarhea; other ROS neg     PAST MEDICAL & SURGICAL HISTORY  HLD (hyperlipidemia)  HTN (hypertension)  S/P hysterectomy  Benign teratoma    FUNCTIONAL HISTORY:   PTA Independent    CURRENT FUNCTIONAL STATUS:  Min A transfers and gait    FAMILY HISTORY   No pertinent family history in first degree relatives    MEDICATIONS   acetaminophen     Tablet .. 650 milliGRAM(s) Oral every 6 hours PRN  atorvastatin 20 milliGRAM(s) Oral at bedtime  brivaracetam  IVPB 50 milliGRAM(s) IV Intermittent two times a day  cycloSPORINE (RESTASIS) 0.05% Emulsion 1 Drop(s) Both EYES two times a day  enoxaparin Injectable 40 milliGRAM(s) SubCutaneous <User Schedule>  latanoprost 0.005% Ophthalmic Solution 1 Drop(s) Both EYES at bedtime  ondansetron Injectable 4 milliGRAM(s) IV Push every 6 hours PRN  polyethylene glycol 3350 17 Gram(s) Oral two times a day  senna 2 Tablet(s) Oral at bedtime    ALLERGIES  hydrochlorothiazide (Unknown)    VITALS  T(C): 36.8 (01-03-23 @ 09:00), Max: 38.2 (01-03-23 @ 01:00)  HR: 79 (01-03-23 @ 09:00) (69 - 82)  BP: 97/60 (01-03-23 @ 09:00) (96/80 - 108/66)  RR: 18 (01-03-23 @ 09:00) (18 - 20)  SpO2: 95% (01-03-23 @ 09:00) (95% - 97%)  Wt(kg): --    PHYSICAL EXAM  Constitutional - NAD, Comfortable  HEENT - NCAT, EOMI  Neck - Supple, No limited ROM  Chest - CTA bilaterally, No wheeze, No rhonchi, No crackles  Cardiovascular - RRR, S1S2, No murmurs  Abdomen - BS+, Soft, NTND  Extremities - No C/C/E, No calf tenderness   Neurologic Exam -                    Cognitive - Awake, Alert, AAO to self, place, date, year, situation     Communication - Fluent, No dysarthria, no aphasia     Cranial Nerves - CN 2-12 intact     Motor - No focal deficits      Sensory - Intact to LT     Reflexes - DTR Intact, No primitive reflexive  Psychiatric - Mood stable, Affect WNL    RECENT LABS/IMAGING  CBC Full  -  ( 02 Jan 2023 05:44 )  WBC Count : 9.93 K/uL  RBC Count : 3.99 M/uL  Hemoglobin : 10.5 g/dL  Hematocrit : 33.5 %  Platelet Count - Automated : 165 K/uL  Mean Cell Volume : 84.0 fl  Mean Cell Hemoglobin : 26.3 pg  Mean Cell Hemoglobin Concentration : 31.3 gm/dL  Auto Neutrophil # : x  Auto Lymphocyte # : x  Auto Monocyte # : x  Auto Eosinophil # : x  Auto Basophil # : x  Auto Neutrophil % : x  Auto Lymphocyte % : x  Auto Monocyte % : x  Auto Eosinophil % : x  Auto Basophil % : x    01-03    137  |  103  |  12  ----------------------------<  98  3.7   |  27  |  0.57    Ca    8.2<L>      03 Jan 2023 06:17  Phos  3.2     01-02  Mg     1.8     01-02    TPro  5.9<L>  /  Alb  2.6<L>  /  TBili  0.5  /  DBili  x   /  AST  57<H>  /  ALT  39  /  AlkPhos  65  01-03    Impression:  81 yo with functional deficits secondary to diagnosis of SDH    Plan:  PT- ROM, Bed Mob, Transfers, Amb w AD and bracing as needed  OT- ADLs, bracing  SLP- Dysphagia eval and treat  Prec- Falls, Cardiac  DVT Prophylaxis- Lovenox  Skin- Turn q2 h  Dispo-  Patient is a 82y old  Female who presents with a chief complaint of SDH (03 Jan 2023 10:13)      Admission HPI:  82F on ASA (last took 1-2d ago), hx HTN, HLD, glaucoma. Fall yest evening or this AM (pt doesn’t remember). CTH: largely chronic L SDH (approx 2cm) w/ 8mm MLS. Exam: AOx2, PERRL, EOMI, no facial, slight R drift, LOWERY at least 4+/5.    Interval History:  Patient s/p burrhole on 12/29.  Most recent imaging:  CT Head No Cont (12.31.22 @ 23:45) >  Status post removal of left-sided subdural drainage catheter with stable   mildly hemorrhagic left convexity extra-axial fluid and air as compared   to prior study.           REVIEW OF SYSTEMS: + poor balance, + mild HA,  No chest pain, shortness of breath, nausea, vomiting or diarhea; other ROS neg     PAST MEDICAL & SURGICAL HISTORY  HLD (hyperlipidemia)  HTN (hypertension)  S/P hysterectomy  Benign teratoma    FUNCTIONAL HISTORY:   PTA Independent    CURRENT FUNCTIONAL STATUS:  Min A transfers and gait    FAMILY HISTORY   No pertinent family history in first degree relatives    MEDICATIONS   acetaminophen     Tablet .. 650 milliGRAM(s) Oral every 6 hours PRN  atorvastatin 20 milliGRAM(s) Oral at bedtime  brivaracetam  IVPB 50 milliGRAM(s) IV Intermittent two times a day  cycloSPORINE (RESTASIS) 0.05% Emulsion 1 Drop(s) Both EYES two times a day  enoxaparin Injectable 40 milliGRAM(s) SubCutaneous <User Schedule>  latanoprost 0.005% Ophthalmic Solution 1 Drop(s) Both EYES at bedtime  ondansetron Injectable 4 milliGRAM(s) IV Push every 6 hours PRN  polyethylene glycol 3350 17 Gram(s) Oral two times a day  senna 2 Tablet(s) Oral at bedtime    ALLERGIES  hydrochlorothiazide (Unknown)    VITALS  T(C): 36.8 (01-03-23 @ 09:00), Max: 38.2 (01-03-23 @ 01:00)  HR: 79 (01-03-23 @ 09:00) (69 - 82)  BP: 97/60 (01-03-23 @ 09:00) (96/80 - 108/66)  RR: 18 (01-03-23 @ 09:00) (18 - 20)  SpO2: 95% (01-03-23 @ 09:00) (95% - 97%)  Wt(kg): --    PHYSICAL EXAM  Constitutional - NAD, Comfortable  HEENT - NCAT, EOMI  Neck - Supple, No limited ROM  Chest - CTA bilaterally, No wheeze, No rhonchi, No crackles  Cardiovascular - RRR, S1S2, No murmurs  Abdomen - BS+, Soft, NTND  Extremities - No C/C/E, No calf tenderness   Neurologic Exam -                 AAO x 2  Slow processing  Motor non-focal 4/5 bl UE and LEs  No clonus     Psychiatric - Mood stable, Affect WNL    RECENT LABS/IMAGING  CBC Full  -  ( 02 Jan 2023 05:44 )  WBC Count : 9.93 K/uL  RBC Count : 3.99 M/uL  Hemoglobin : 10.5 g/dL  Hematocrit : 33.5 %  Platelet Count - Automated : 165 K/uL  Mean Cell Volume : 84.0 fl  Mean Cell Hemoglobin : 26.3 pg  Mean Cell Hemoglobin Concentration : 31.3 gm/dL  Auto Neutrophil # : x  Auto Lymphocyte # : x  Auto Monocyte # : x  Auto Eosinophil # : x  Auto Basophil # : x  Auto Neutrophil % : x  Auto Lymphocyte % : x  Auto Monocyte % : x  Auto Eosinophil % : x  Auto Basophil % : x    01-03    137  |  103  |  12  ----------------------------<  98  3.7   |  27  |  0.57    Ca    8.2<L>      03 Jan 2023 06:17  Phos  3.2     01-02  Mg     1.8     01-02    TPro  5.9<L>  /  Alb  2.6<L>  /  TBili  0.5  /  DBili  x   /  AST  57<H>  /  ALT  39  /  AlkPhos  65  01-03    Impression:  83 yo with functional deficits secondary to diagnosis of SDH    Plan:  PT- ROM, Bed Mob, Transfers, Amb w AD and bracing as needed  OT- ADLs, bracing  SLP- Dysphagia eval and treat  Prec- Falls, Cardiac  DVT Prophylaxis- Lovenox  Skin- Turn q2 h  Dispo- Acute Rehab- can tolerate 3h/d of therapies and requires daily physician visits

## 2023-01-03 NOTE — PROVIDER CONTACT NOTE (OTHER) - ACTION/TREATMENT ORDERED:
PO Tylenol was given, cold packs applied to patient and top sheet only. Reassessment of rectal temperature was 100.5. CORAZON Webb made aware, will continue to monitor
Provider assessed patient at bedside, no concerns for worsening condition, continue to monitor.

## 2023-01-04 ENCOUNTER — TRANSCRIPTION ENCOUNTER (OUTPATIENT)
Age: 83
End: 2023-01-04

## 2023-01-04 ENCOUNTER — INPATIENT (INPATIENT)
Facility: HOSPITAL | Age: 83
LOS: 28 days | Discharge: HOME HEALTH | DRG: 949 | End: 2023-02-02
Attending: STUDENT IN AN ORGANIZED HEALTH CARE EDUCATION/TRAINING PROGRAM | Admitting: STUDENT IN AN ORGANIZED HEALTH CARE EDUCATION/TRAINING PROGRAM
Payer: MEDICARE

## 2023-01-04 VITALS
HEART RATE: 83 BPM | RESPIRATION RATE: 19 BRPM | OXYGEN SATURATION: 95 % | SYSTOLIC BLOOD PRESSURE: 109 MMHG | DIASTOLIC BLOOD PRESSURE: 59 MMHG

## 2023-01-04 VITALS
HEART RATE: 75 BPM | HEIGHT: 67 IN | SYSTOLIC BLOOD PRESSURE: 115 MMHG | OXYGEN SATURATION: 98 % | WEIGHT: 151.9 LBS | TEMPERATURE: 99 F | RESPIRATION RATE: 16 BRPM | DIASTOLIC BLOOD PRESSURE: 64 MMHG

## 2023-01-04 DIAGNOSIS — Z90.710 ACQUIRED ABSENCE OF BOTH CERVIX AND UTERUS: Chronic | ICD-10-CM

## 2023-01-04 DIAGNOSIS — R60.0 LOCALIZED EDEMA: ICD-10-CM

## 2023-01-04 DIAGNOSIS — K59.00 CONSTIPATION, UNSPECIFIED: ICD-10-CM

## 2023-01-04 DIAGNOSIS — E78.5 HYPERLIPIDEMIA, UNSPECIFIED: ICD-10-CM

## 2023-01-04 DIAGNOSIS — D64.9 ANEMIA, UNSPECIFIED: ICD-10-CM

## 2023-01-04 DIAGNOSIS — Z87.891 PERSONAL HISTORY OF NICOTINE DEPENDENCE: ICD-10-CM

## 2023-01-04 DIAGNOSIS — I10 ESSENTIAL (PRIMARY) HYPERTENSION: ICD-10-CM

## 2023-01-04 DIAGNOSIS — M79.661 PAIN IN RIGHT LOWER LEG: ICD-10-CM

## 2023-01-04 DIAGNOSIS — E44.0 MODERATE PROTEIN-CALORIE MALNUTRITION: ICD-10-CM
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
## 2023-01-04 DIAGNOSIS — R45.87 IMPULSIVENESS: ICD-10-CM

## 2023-01-04 DIAGNOSIS — Z48.811 ENCOUNTER FOR SURGICAL AFTERCARE FOLLOWING SURGERY ON THE NERVOUS SYSTEM: ICD-10-CM

## 2023-01-04 DIAGNOSIS — S70.211D: ICD-10-CM

## 2023-01-04 DIAGNOSIS — D36.9 BENIGN NEOPLASM, UNSPECIFIED SITE: Chronic | ICD-10-CM

## 2023-01-04 DIAGNOSIS — S06.5X0D TRAUMATIC SUBDURAL HEMORRHAGE WITHOUT LOSS OF CONSCIOUSNESS, SUBSEQUENT ENCOUNTER: ICD-10-CM

## 2023-01-04 DIAGNOSIS — S06.5X0A TRAUMATIC SUBDURAL HEMORRHAGE WITHOUT LOSS OF CONSCIOUSNESS, INITIAL ENCOUNTER: ICD-10-CM

## 2023-01-04 DIAGNOSIS — R27.8 OTHER LACK OF COORDINATION: ICD-10-CM

## 2023-01-04 DIAGNOSIS — R41.89 OTHER SYMPTOMS AND SIGNS INVOLVING COGNITIVE FUNCTIONS AND AWARENESS: ICD-10-CM
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
## 2023-01-04 DIAGNOSIS — H40.9 UNSPECIFIED GLAUCOMA: ICD-10-CM

## 2023-01-04 DIAGNOSIS — Z51.89 ENCOUNTER FOR OTHER SPECIFIED AFTERCARE: ICD-10-CM

## 2023-01-04 DIAGNOSIS — M62.82 RHABDOMYOLYSIS: ICD-10-CM

## 2023-01-04 DIAGNOSIS — R26.9 UNSPECIFIED ABNORMALITIES OF GAIT AND MOBILITY: ICD-10-CM

## 2023-01-04 LAB — SARS-COV-2 RNA SPEC QL NAA+PROBE: SIGNIFICANT CHANGE UP

## 2023-01-04 PROCEDURE — 93970 EXTREMITY STUDY: CPT

## 2023-01-04 PROCEDURE — 71045 X-RAY EXAM CHEST 1 VIEW: CPT

## 2023-01-04 PROCEDURE — 85610 PROTHROMBIN TIME: CPT

## 2023-01-04 PROCEDURE — 73560 X-RAY EXAM OF KNEE 1 OR 2: CPT

## 2023-01-04 PROCEDURE — 82553 CREATINE MB FRACTION: CPT

## 2023-01-04 PROCEDURE — 80061 LIPID PANEL: CPT

## 2023-01-04 PROCEDURE — 97166 OT EVAL MOD COMPLEX 45 MIN: CPT

## 2023-01-04 PROCEDURE — 86900 BLOOD TYPING SEROLOGIC ABO: CPT

## 2023-01-04 PROCEDURE — 97116 GAIT TRAINING THERAPY: CPT

## 2023-01-04 PROCEDURE — 92610 EVALUATE SWALLOWING FUNCTION: CPT

## 2023-01-04 PROCEDURE — 85576 BLOOD PLATELET AGGREGATION: CPT

## 2023-01-04 PROCEDURE — 82947 ASSAY GLUCOSE BLOOD QUANT: CPT

## 2023-01-04 PROCEDURE — 84484 ASSAY OF TROPONIN QUANT: CPT

## 2023-01-04 PROCEDURE — 73552 X-RAY EXAM OF FEMUR 2/>: CPT

## 2023-01-04 PROCEDURE — 87640 STAPH A DNA AMP PROBE: CPT

## 2023-01-04 PROCEDURE — C1713: CPT

## 2023-01-04 PROCEDURE — 99223 1ST HOSP IP/OBS HIGH 75: CPT

## 2023-01-04 PROCEDURE — 84105 ASSAY OF URINE PHOSPHORUS: CPT

## 2023-01-04 PROCEDURE — 82330 ASSAY OF CALCIUM: CPT

## 2023-01-04 PROCEDURE — C1889: CPT

## 2023-01-04 PROCEDURE — 93005 ELECTROCARDIOGRAM TRACING: CPT

## 2023-01-04 PROCEDURE — 73502 X-RAY EXAM HIP UNI 2-3 VIEWS: CPT

## 2023-01-04 PROCEDURE — 99285 EMERGENCY DEPT VISIT HI MDM: CPT

## 2023-01-04 PROCEDURE — 36415 COLL VENOUS BLD VENIPUNCTURE: CPT

## 2023-01-04 PROCEDURE — 86850 RBC ANTIBODY SCREEN: CPT

## 2023-01-04 PROCEDURE — 84300 ASSAY OF URINE SODIUM: CPT

## 2023-01-04 PROCEDURE — 85014 HEMATOCRIT: CPT

## 2023-01-04 PROCEDURE — 85730 THROMBOPLASTIN TIME PARTIAL: CPT

## 2023-01-04 PROCEDURE — 87040 BLOOD CULTURE FOR BACTERIA: CPT

## 2023-01-04 PROCEDURE — 0225U NFCT DS DNA&RNA 21 SARSCOV2: CPT

## 2023-01-04 PROCEDURE — 84133 ASSAY OF URINE POTASSIUM: CPT

## 2023-01-04 PROCEDURE — U0005: CPT

## 2023-01-04 PROCEDURE — 85027 COMPLETE CBC AUTOMATED: CPT

## 2023-01-04 PROCEDURE — 72125 CT NECK SPINE W/O DYE: CPT | Mod: MA

## 2023-01-04 PROCEDURE — 85025 COMPLETE CBC W/AUTO DIFF WBC: CPT

## 2023-01-04 PROCEDURE — 86901 BLOOD TYPING SEROLOGIC RH(D): CPT

## 2023-01-04 PROCEDURE — 70450 CT HEAD/BRAIN W/O DYE: CPT

## 2023-01-04 PROCEDURE — 83036 HEMOGLOBIN GLYCOSYLATED A1C: CPT

## 2023-01-04 PROCEDURE — 83605 ASSAY OF LACTIC ACID: CPT

## 2023-01-04 PROCEDURE — 84443 ASSAY THYROID STIM HORMONE: CPT

## 2023-01-04 PROCEDURE — 80053 COMPREHEN METABOLIC PANEL: CPT

## 2023-01-04 PROCEDURE — 72170 X-RAY EXAM OF PELVIS: CPT

## 2023-01-04 PROCEDURE — 96375 TX/PRO/DX INJ NEW DRUG ADDON: CPT

## 2023-01-04 PROCEDURE — 99232 SBSQ HOSP IP/OBS MODERATE 35: CPT

## 2023-01-04 PROCEDURE — 82962 GLUCOSE BLOOD TEST: CPT

## 2023-01-04 PROCEDURE — 82570 ASSAY OF URINE CREATININE: CPT

## 2023-01-04 PROCEDURE — 84436 ASSAY OF TOTAL THYROXINE: CPT

## 2023-01-04 PROCEDURE — 82550 ASSAY OF CK (CPK): CPT

## 2023-01-04 PROCEDURE — 97162 PT EVAL MOD COMPLEX 30 MIN: CPT

## 2023-01-04 PROCEDURE — 80048 BASIC METABOLIC PNL TOTAL CA: CPT

## 2023-01-04 PROCEDURE — 96374 THER/PROPH/DIAG INJ IV PUSH: CPT

## 2023-01-04 PROCEDURE — C8929: CPT

## 2023-01-04 PROCEDURE — 84295 ASSAY OF SERUM SODIUM: CPT

## 2023-01-04 PROCEDURE — 82435 ASSAY OF BLOOD CHLORIDE: CPT

## 2023-01-04 PROCEDURE — 84480 ASSAY TRIIODOTHYRONINE (T3): CPT

## 2023-01-04 PROCEDURE — 84100 ASSAY OF PHOSPHORUS: CPT

## 2023-01-04 PROCEDURE — 83735 ASSAY OF MAGNESIUM: CPT

## 2023-01-04 PROCEDURE — 87641 MR-STAPH DNA AMP PROBE: CPT

## 2023-01-04 PROCEDURE — 85018 HEMOGLOBIN: CPT

## 2023-01-04 PROCEDURE — 97110 THERAPEUTIC EXERCISES: CPT

## 2023-01-04 PROCEDURE — 84132 ASSAY OF SERUM POTASSIUM: CPT

## 2023-01-04 PROCEDURE — 82803 BLOOD GASES ANY COMBINATION: CPT

## 2023-01-04 PROCEDURE — 82436 ASSAY OF URINE CHLORIDE: CPT

## 2023-01-04 PROCEDURE — 81001 URINALYSIS AUTO W/SCOPE: CPT

## 2023-01-04 PROCEDURE — U0003: CPT

## 2023-01-04 RX ORDER — BRIVARACETAM 25 MG/1
50 TABLET, FILM COATED ORAL
Refills: 0 | Status: DISCONTINUED | OUTPATIENT
Start: 2023-01-04 | End: 2023-01-04

## 2023-01-04 RX ORDER — AMLODIPINE BESYLATE 2.5 MG/1
1 TABLET ORAL
Qty: 0 | Refills: 0 | DISCHARGE

## 2023-01-04 RX ORDER — AMLODIPINE BESYLATE 2.5 MG/1
2.5 TABLET ORAL DAILY
Refills: 0 | Status: DISCONTINUED | OUTPATIENT
Start: 2023-01-04 | End: 2023-01-04

## 2023-01-04 RX ORDER — AMLODIPINE BESYLATE 2.5 MG/1
1 TABLET ORAL
Qty: 0 | Refills: 0 | DISCHARGE
Start: 2023-01-04

## 2023-01-04 RX ORDER — ATORVASTATIN CALCIUM 80 MG/1
20 TABLET, FILM COATED ORAL AT BEDTIME
Refills: 0 | Status: DISCONTINUED | OUTPATIENT
Start: 2023-01-04 | End: 2023-02-02

## 2023-01-04 RX ORDER — ENOXAPARIN SODIUM 100 MG/ML
40 INJECTION SUBCUTANEOUS
Qty: 0 | Refills: 0 | DISCHARGE
Start: 2023-01-04

## 2023-01-04 RX ORDER — ACETAMINOPHEN 500 MG
2 TABLET ORAL
Qty: 0 | Refills: 0 | DISCHARGE
Start: 2023-01-04

## 2023-01-04 RX ORDER — ASPIRIN/CALCIUM CARB/MAGNESIUM 324 MG
1 TABLET ORAL
Qty: 0 | Refills: 0 | DISCHARGE

## 2023-01-04 RX ORDER — POLYETHYLENE GLYCOL 3350 17 G/17G
17 POWDER, FOR SOLUTION ORAL
Qty: 0 | Refills: 0 | DISCHARGE
Start: 2023-01-04

## 2023-01-04 RX ORDER — AMLODIPINE BESYLATE 2.5 MG/1
2.5 TABLET ORAL DAILY
Refills: 0 | Status: DISCONTINUED | OUTPATIENT
Start: 2023-01-04 | End: 2023-01-05

## 2023-01-04 RX ORDER — LATANOPROST 0.05 MG/ML
1 SOLUTION/ DROPS OPHTHALMIC; TOPICAL AT BEDTIME
Refills: 0 | Status: DISCONTINUED | OUTPATIENT
Start: 2023-01-04 | End: 2023-02-02

## 2023-01-04 RX ORDER — ACETAMINOPHEN 500 MG
650 TABLET ORAL EVERY 6 HOURS
Refills: 0 | Status: DISCONTINUED | OUTPATIENT
Start: 2023-01-04 | End: 2023-02-02

## 2023-01-04 RX ORDER — ENOXAPARIN SODIUM 100 MG/ML
40 INJECTION SUBCUTANEOUS
Refills: 0 | Status: DISCONTINUED | OUTPATIENT
Start: 2023-01-04 | End: 2023-02-02

## 2023-01-04 RX ORDER — POLYETHYLENE GLYCOL 3350 17 G/17G
17 POWDER, FOR SOLUTION ORAL
Refills: 0 | Status: DISCONTINUED | OUTPATIENT
Start: 2023-01-04 | End: 2023-01-07

## 2023-01-04 RX ORDER — BRIVARACETAM 25 MG/1
50 TABLET, FILM COATED ORAL
Refills: 0 | Status: DISCONTINUED | OUTPATIENT
Start: 2023-01-04 | End: 2023-01-05

## 2023-01-04 RX ORDER — SENNA PLUS 8.6 MG/1
2 TABLET ORAL
Qty: 0 | Refills: 0 | DISCHARGE
Start: 2023-01-04

## 2023-01-04 RX ORDER — SENNA PLUS 8.6 MG/1
2 TABLET ORAL AT BEDTIME
Refills: 0 | Status: DISCONTINUED | OUTPATIENT
Start: 2023-01-04 | End: 2023-01-07

## 2023-01-04 RX ADMIN — POLYETHYLENE GLYCOL 3350 17 GRAM(S): 17 POWDER, FOR SOLUTION ORAL at 18:32

## 2023-01-04 RX ADMIN — BRIVARACETAM 220 MILLIGRAM(S): 25 TABLET, FILM COATED ORAL at 05:11

## 2023-01-04 RX ADMIN — CYCLOSPORINE 1 DROP(S): 0.5 EMULSION OPHTHALMIC at 05:11

## 2023-01-04 RX ADMIN — ATORVASTATIN CALCIUM 20 MILLIGRAM(S): 80 TABLET, FILM COATED ORAL at 22:03

## 2023-01-04 RX ADMIN — Medication 5 MILLIGRAM(S): at 13:11

## 2023-01-04 RX ADMIN — LATANOPROST 1 DROP(S): 0.05 SOLUTION/ DROPS OPHTHALMIC; TOPICAL at 22:04

## 2023-01-04 RX ADMIN — Medication 650 MILLIGRAM(S): at 12:43

## 2023-01-04 RX ADMIN — SENNA PLUS 2 TABLET(S): 8.6 TABLET ORAL at 22:03

## 2023-01-04 RX ADMIN — ENOXAPARIN SODIUM 40 MILLIGRAM(S): 100 INJECTION SUBCUTANEOUS at 18:32

## 2023-01-04 RX ADMIN — AMLODIPINE BESYLATE 5 MILLIGRAM(S): 2.5 TABLET ORAL at 05:11

## 2023-01-04 RX ADMIN — BRIVARACETAM 50 MILLIGRAM(S): 25 TABLET, FILM COATED ORAL at 18:32

## 2023-01-04 RX ADMIN — POLYETHYLENE GLYCOL 3350 17 GRAM(S): 17 POWDER, FOR SOLUTION ORAL at 05:12

## 2023-01-04 NOTE — PROGRESS NOTE ADULT - SUBJECTIVE AND OBJECTIVE BOX
DATE OF SERVICE: 01-04-23 @ 12:12    Patient is a 82y old  Female who presents with a chief complaint of SDH (04 Jan 2023 10:59)      INTERVAL HISTORY: Feels ok.     REVIEW OF SYSTEMS:  CONSTITUTIONAL: No weakness  EYES/ENT: No visual changes;  No throat pain   NECK: No pain or stiffness  RESPIRATORY: No cough, wheezing; No shortness of breath  CARDIOVASCULAR: No chest pain or palpitations  GASTROINTESTINAL: No abdominal  pain. No nausea, vomiting, or hematemesis  GENITOURINARY: No dysuria, frequency or hematuria  NEUROLOGICAL: No stroke like symptoms  SKIN: No rashes      	  MEDICATIONS:  amLODIPine   Tablet 2.5 milliGRAM(s) Oral daily        PHYSICAL EXAM:  T(C): 36.6 (01-04-23 @ 09:25), Max: 37.7 (01-04-23 @ 01:00)  HR: 75 (01-04-23 @ 09:25) (73 - 86)  BP: 101/63 (01-04-23 @ 09:25) (100/62 - 125/75)  RR: 17 (01-04-23 @ 09:25) (17 - 19)  SpO2: 96% (01-04-23 @ 09:25) (96% - 97%)  Wt(kg): --  I&O's Summary    03 Jan 2023 07:01  -  04 Jan 2023 07:00  --------------------------------------------------------  IN: 1340 mL / OUT: 2200 mL / NET: -860 mL          Appearance: In no distress	  HEENT:    PERRL, EOMI	  Cardiovascular:  S1 S2, No JVD  Respiratory: Lungs clear to auscultation	  Gastrointestinal:  Soft, Non-tender, + BS	  Vascularature:  No edema of LE  Psychiatric: Appropriate affect   Neuro: no acute focal deficits           01-03    137  |  103  |  12  ----------------------------<  98  3.7   |  27  |  0.57    Ca    8.2<L>      03 Jan 2023 06:17    TPro  5.9<L>  /  Alb  2.6<L>  /  TBili  0.5  /  DBili  x   /  AST  57<H>  /  ALT  39  /  AlkPhos  65  01-03        Labs personally reviewed      ASSESSMENT/PLAN: 	      Ms. Laya Arevalo is a 81 yo female with PMH of HTN, HLD and glaucoma who presents after fall with SDH now admitted for NSCU for further management. 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     Problem/Plan -1  Problem: Cardiac Risk Stratificatin  - ECG with no ischemia noted  - Patient not in decompendated HF  - TTE grossly unremarkable  - Trop likely elevated d/t acute rhabdo secondary to fall and unknown downtime  - No hx of severe MS/AS.  - No hx of tachy steven arrhythmia  - Patient is mod risk for mod risk non-elective neurosurgery. No contraindication to proceed.  - Tolerated procedure well.     Problem/Plan -2  Problem: Elevated Trop  - ECG with no ischemia noted  - TTE unremarkable with normal LV and no WMA  - Elevated trop likely elevated d/t acute rhabdo secondary to fall and unknown downtime    Problem/Plan -3  Problem: Acute Rhabdomyolysis  - Patient s/p fall with unknown downtime  - CK upon presentation 77727 now downtrending (7407--> 499)  - Cont to closely trend  - Resolved    Problem/Plan -4  Problem:  Hypertension  - BP downtrending and now soft  - Amlodipine reduced to 5mg PO daily  - BP now within normal limits on lower dose of amlodipine    Problem/Plan -5  Problem: HLD  - c/w Atorvastatin           Gemma Perez, AG-NP   Nathan Jones DO [facility]  Cardiovascular Medicine  [address] [place] [address], [address]  Available through call or text on [employer]  Office: [phone]

## 2023-01-04 NOTE — H&P ADULT - ASSESSMENT
82y female with a history of HTN, HLD, glaucoma who presented to [facility] on 12/28/22 after fall and found to have chronic left SDH s/p L francis hole SDH evacuation. Hospital course complicated by elevated CK and troponin likely secondary to acute rhabdomyolysis..       Admitted to [facility] acute inpatient rehab on 1/4/23 for ADL, gait, and functional impairments.     # Traumatic SDH  - Comprehensive Multidisciplinary Rehab Program: 3 hours a day, 5 days a week with PT/OT/SLP     P&O as needed  -s/p left [place] hole 12/29/22  -c/w brivaracetam to complete 7 days (last day 1/5 evening)  -staples to be removed 1/8/23  -continue to hold ASA per neurosurg    #Rhabdomyolysis  -elevated CK (now downtrending) and troponin  -EKG and TTE wnl    #Glaucoma  -c/w home eyedrops    #HTN  -c/w with decreased home dose of amlodipine    #HLD  -c/w atrovastatin    # Mood/Cognition:  -Neuropsych consult PRN    # Sleep:  - Maintain low stim environment    # Pain Management:  - Tylenol PRN    # GI/Bowel:  - Senna QHS, Miralax daily PRN  - GI ppx:    # /Bladder:   - Bladder scan x1 on admission, SC PRN  - Encourage timed voids every 4 hours while awake       # Skin/Pressure Injury:  - Skin assessment on admission: ***  - Monitor Incisions: ***  - Turn every 2 hours while in bed    # Diet:   - Diet Consistency/Modifications: Regular diet  - Aspiration Precautions  - SLP consult for swallow function evaluation and treatment    # DVT ppx:  - lovenox  - Last Doppler on 12/29 negative for DVT    # Restrictions/Precautions:  - Precautions: falls, seizures, aspiration    ---------------  Outpatient Follow-up:    Melva López)  Neurosurgery  [address] [place] [address], [address]  [place] [address]  Phone: [phone]  Fax: [phone]  Follow Up Time:    PCP    --------------    Goals: Safe discharge to home  Estimated Length of Stay: 10-14 days  Rehab Potential: Good  Medical Prognosis: Good  Estimated Disposition: Home with Home Care  ---------------    PRESCREEN COMPARISON:  I have reviewed the prescreen information and I have found no relevant changes between the preadmission screening and my post admission evaluation.    RATIONALE FOR INPATIENT ADMISSION: Patient demonstrates the following:  [X] Medically appropriate for rehabilitation admission  [X] Has attainable rehab goals with an appropriate initial discharge plan  [X]Has rehabilitation potential (expected to make a significant improvement within a reasonable period of time)  [X] Requires close medical management by a rehab physician, rehab nursing care, Hospitalist and comprehensive interdisciplinary team (including PT, OT and/or SLP, Prosthetics and Orthotics)   82y female with a history of HTN, HLD, glaucoma who presented to [facility] on 12/28/22 after fall and found to have chronic left SDH s/p L francis hole SDH evacuation. Hospital course complicated by elevated CK and troponin likely secondary to acute rhabdomyolysis.       Admitted to [facility] acute inpatient rehab on 1/4/23 for ADL, gait, and functional impairments.     # Traumatic SDH  - Comprehensive Multidisciplinary Rehab Program: 3 hours a day, 5 days a week with PT/OT/SLP     P&O as needed  -s/p left Francis hole 12/29/22  -c/w brivaracetam to complete 7 days (last day 1/5 evening)  -staples to be removed 1/8/23  -continue to hold ASA per neurosurg    #Rhabdomyolysis - improved  -elevated CK (now downtrending) and troponin  -EKG and TTE wnl    #Glaucoma  -c/w home eyedrops    #HTN  -c/w with decreased home dose of amlodipine    #HLD  -c/w atorvastatin    # Mood/Cognition:  -Neuropsych consult PRN    # Sleep:  - Maintain low stim environment    # Pain Management:  - Tylenol PRN    # GI/Bowel:  - Senna QHS, Miralax daily     # /Bladder:   - Bladder scan x1 on admission, SC PRN  - Encourage timed voids every 4 hours while awake       # Skin/Pressure Injury:  - Skin assessment on admission:  right lateral hip abrasion 5cm x 2cm likely secondary to fall at home  - Monitor Incisions: left surgical clean incision intact with staples;    # Diet:   - Diet Consistency/Modifications: Regular diet  - Aspiration Precautions  - SLP consult for swallow function evaluation and treatment    # DVT ppx:  - lovenox  - Last Doppler on 12/29 negative for DVT    # Restrictions/Precautions:  - Precautions: falls, seizures, aspiration    ---------------  Outpatient Follow-up:    Melva López)  Neurosurgery  [address] [place] [address], [address]  [place] [address]  Phone: [phone]  Fax: [phone]  Follow Up Time:    PCP    --------------    Goals: Safe discharge to home  Estimated Length of Stay: 10-14 days  Rehab Potential: Good  Medical Prognosis: Good  Estimated Disposition: Home with Home Care  ---------------    PRESCREEN COMPARISON:  I have reviewed the prescreen information and I have found no relevant changes between the preadmission screening and my post admission evaluation.    RATIONALE FOR INPATIENT ADMISSION: Patient demonstrates the following:  [X] Medically appropriate for rehabilitation admission  [X] Has attainable rehab goals with an appropriate initial discharge plan  [X]Has rehabilitation potential (expected to make a significant improvement within a reasonable period of time)  [X] Requires close medical management by a rehab physician, rehab nursing care, Hospitalist and comprehensive interdisciplinary team (including PT, OT and/or SLP, Prosthetics and Orthotics)   82y female with a history of HTN, HLD, glaucoma who presented to [facility] on 12/28/22 after fall and found to have chronic left SDH s/p L francis hole SDH evacuation.     Admitted to [facility] acute inpatient rehab on 1/4/23 with cognitive deficits, and  ADL, gait, and functional impairments.     # Traumatic SDH  - Comprehensive Multidisciplinary Rehab Program: 3 hours a day, 5 days a week with PT/OT/SLP     P&O as needed  -s/p left [place] hole 12/29/22  -c/w brivaracetam to complete 7 days (last day 1/5 evening)  -staples to be removed 1/8/23  -continue to hold ASA per neurosurg    #Rhabdomyolysis - improved  -elevated CK (now downtrending) and troponin  -EKG and TTE wnl    #Glaucoma  -c/w home eyedrops    #HTN  -c/w with decreased home dose of amlodipine    #HLD  -c/w atorvastatin        # Sleep:  - Maintain low stim environment    # Pain Management:  - Tylenol PRN    # GI/Bowel:  - Senna QHS, Miralax daily     # /Bladder:   - Bladder scan x1 on admission, SC PRN  - Encourage timed voids every 4 hours while awake       # Skin/Pressure Injury:  - Skin assessment on admission:  right lateral hip abrasion 5cm x 2cm likely secondary to fall at home-- apply wound gel, cavilon to periwound and cover with allvyn dressing.   - Monitor Incisions: left surgical clean incision intact with staples;    # Diet:   - Diet Consistency/Modifications: Regular diet  - Aspiration Precautions  - SLP consult for swallow function evaluation and treatment    # DVT ppx:  - lovenox  - Last Doppler on 12/29 negative for DVT    # Restrictions/Precautions:  - Precautions: falls, seizures, aspiration    ---------------  Outpatient Follow-up:    Melva López)  Neurosurgery  [address] [place] [address], [address]  [place] [address]  Phone: [phone]  Fax: [phone]  Follow Up Time:    PCP    --------------    Goals: Safe discharge to home  Estimated Length of Stay: 14 days  Rehab Potential: Good  Medical Prognosis: Good  Estimated Disposition: Home with Home Care  ---------------    PRESCREEN COMPARISON:  I have reviewed the prescreen information and I have found no relevant changes between the preadmission screening and my post admission evaluation.    RATIONALE FOR INPATIENT ADMISSION: Patient demonstrates the following:  [X] Medically appropriate for rehabilitation admission  [X] Has attainable rehab goals with an appropriate initial discharge plan  [X]Has rehabilitation potential (expected to make a significant improvement within a reasonable period of time)  [X] Requires close medical management by a rehab physician, rehab nursing care, Hospitalist and comprehensive interdisciplinary team (including PT, OT and/or SLP, Prosthetics and Orthotics)

## 2023-01-04 NOTE — PROGRESS NOTE ADULT - PROVIDER SPECIALTY LIST ADULT
NSICU
NSICU
Neurosurgery
Neurosurgery
Cardiology
NSICU
NSICU
Neurosurgery
Cardiology
NSICU
Neurosurgery
Hospitalist
Hospitalist

## 2023-01-04 NOTE — PROGRESS NOTE ADULT - PROBLEM SELECTOR PLAN 6
Elevated CK on admission likely secondary to fall, now downtrending.      Elevated trop on admission likely d/t acute rhabdo secondary to fall and unknown downtime. ECG with no ischemia noted. TTE unremarkable with normal LV and no WMA
Elevated CK on admission likely secondary to fall, now downtrending.      Elevated trop on admission likely d/t acute rhabdo secondary to fall and unknown downtime. ECG with no ischemia noted. TTE unremarkable with normal LV and no WMA

## 2023-01-04 NOTE — H&P ADULT - ATTENDING COMMENTS
Pt. seen with resident.  Agree with documentation above as per resident with amendments made as appropriate. Patient medically stable. Appropriate for acute rehabilitation.     left SDH s/p fall with cognitive deficits.

## 2023-01-04 NOTE — PROGRESS NOTE ADULT - THIS PATIENT HAS THE FOLLOWING CONDITION(S)/DIAGNOSES ON THIS ADMISSION:
Brain Compression / Herniation
None
Brain Compression / Herniation
Brain Compression / Herniation
None
Brain Compression / Herniation
None
None

## 2023-01-04 NOTE — H&P ADULT - NSHPLABSRESULTS_GEN_ALL_CORE
01-03    137  |  103  |  12  ----------------------------<  98  3.7   |  27  |  0.57  01-02    140  |  104  |  11  ----------------------------<  73  3.6   |  27  |  0.59    Ca    8.2<L>      03 Jan 2023 06:17  Ca    8.3<L>      02 Jan 2023 05:45    TPro  5.9<L>  /  Alb  2.6<L>  /  TBili  0.5  /  DBili  x   /  AST  57<H>  /  ALT  39  /  AlkPhos  65  01-03                                              10.5   9.93  )-----------( 165      ( 02 Jan 2023 05:44 )             33.5     CAPILLARY BLOOD GLUCOSE    Radiology      [facility]  12/31/2022      IMPRESSION:    Status post removal of left-sided subdural drainage catheter with stable   mildly hemorrhagic left convexity extra-axial fluid and air as compared   to prior study.

## 2023-01-04 NOTE — PROGRESS NOTE ADULT - SUBJECTIVE AND OBJECTIVE BOX
Patient is a 82y old  Female who presents with a chief complaint of SDH (03 Jan 2023 14:19)      SUBJECTIVE / OVERNIGHT EVENTS: Appears more alert today, feels well.     MEDICATIONS  (STANDING):  amLODIPine   Tablet 5 milliGRAM(s) Oral daily  atorvastatin 20 milliGRAM(s) Oral at bedtime  brivaracetam  IVPB 50 milliGRAM(s) IV Intermittent two times a day  cycloSPORINE (RESTASIS) 0.05% Emulsion 1 Drop(s) Both EYES two times a day  enoxaparin Injectable 40 milliGRAM(s) SubCutaneous <User Schedule>  latanoprost 0.005% Ophthalmic Solution 1 Drop(s) Both EYES at bedtime  polyethylene glycol 3350 17 Gram(s) Oral two times a day  senna 2 Tablet(s) Oral at bedtime    MEDICATIONS  (PRN):  acetaminophen     Tablet .. 650 milliGRAM(s) Oral every 6 hours PRN Temp greater or equal to 38C (100.4F), Mild Pain (1 - 3)  ondansetron Injectable 4 milliGRAM(s) IV Push every 6 hours PRN Nausea and/or Vomiting      CAPILLARY BLOOD GLUCOSE        I&O's Summary    03 Jan 2023 07:01  -  04 Jan 2023 07:00  --------------------------------------------------------  IN: 1340 mL / OUT: 2200 mL / NET: -860 mL        PHYSICAL EXAM:  T(C): 36.6 (01-04-23 @ 09:25), Max: 37.7 (01-04-23 @ 01:00)  HR: 75 (01-04-23 @ 09:25) (73 - 86)  BP: 101/63 (01-04-23 @ 09:25) (100/62 - 125/75)  RR: 17 (01-04-23 @ 09:25) (17 - 19)  SpO2: 96% (01-04-23 @ 09:25) (96% - 97%)  CONSTITUTIONAL: NAD, well-developed, well-groomed  EYES: PERRLA; conjunctiva and sclera clear  ENMT: Moist oral mucosa, no pharyngeal injection or exudates; normal dentition  NECK: Supple, no palpable masses; no thyromegaly  RESPIRATORY: Normal respiratory effort; lungs are clear to auscultation anteriorly  CARDIOVASCULAR: Regular rate and rhythm, normal S1 and S2, no murmur/rub/gallop; No lower extremity edema; Peripheral pulses are 2+ bilaterally  ABDOMEN: Nontender to palpation, normoactive bowel sounds, no rebound/guarding; No hepatosplenomegaly  MUSCULOSKELETAL:  No clubbing or cyanosis of digits; no joint swelling or tenderness to palpation  PSYCH: A+O to person, place; affect appropriate  NEUROLOGY: CN 2-12 are intact and symmetric; no gross sensory deficits   SKIN: No rashes; no palpable lesions    LABS:    01-03    137  |  103  |  12  ----------------------------<  98  3.7   |  27  |  0.57    Ca    8.2<L>      03 Jan 2023 06:17    TPro  5.9<L>  /  Alb  2.6<L>  /  TBili  0.5  /  DBili  x   /  AST  57<H>  /  ALT  39  /  AlkPhos  65  01-03      CARDIAC MARKERS ( 03 Jan 2023 06:17 )  x     / x     / 296 U/L / x     / x              RADIOLOGY & ADDITIONAL TESTS:    Imaging Personally Reviewed:    Consultant(s) Notes Reviewed:      Care Discussed with Consultants/Other Providers: Nsx

## 2023-01-04 NOTE — H&P ADULT - NSHPSOCIALHISTORY_GEN_ALL_CORE
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  Education: college level    FUNCTIONAL HISTORY      Previous Functional Status:  Independent in ambulation, ADL's, transfers prior to hospitalization    Current Functional Status:  min A with transfers and ambulation SOCIAL HISTORY  Smoking - prior history of smoking and quit >10 years ago  EtOH -  denies  drugs-- denies    Marital Status -    Lives Alone in a elevator building with no SPEEDY  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

## 2023-01-04 NOTE — H&P ADULT - NSHPPHYSICALEXAM_GEN_ALL_CORE
Constitutional - NAD, Comfortable  HEENT - NCAT, EOMI  Neck - Supple, No limited ROM  Chest - good chest expansion, good respiratory effort, CTAB   Cardio - warm and well perfused, RRR, no murmur  Abdomen -  Soft, NTND  Extremities - No peripheral edema, No calf tenderness   Neurologic Exam:                    Cognitive -             Orientation: Awake, Alert, AAO to self, place, date, year, situation            Attention:  Days of week, recall 3 objects without cuing            Memory: Recent/ remote memory intact            Thought: process, content appropriate     Speech - Fluent, Comprehensible, No dysarthria, No aphasia      Cranial Nerves - No facial asymmetry, Tongue midline, EOMI, Shoulder shrug intact     Motor -                      LEFT    UE - ShAB 5/5, EF 5/5, EE 5/5, WE 5/5,  WNL                    RIGHT UE - ShAB 5/5, EF 5/5, EE 5/5, WE 5/5,  WNL                    LEFT    LE - HF 5/5, KE 5/5, DF 5/5, PF 5/5                    RIGHT LE - HF 5/5, KE 5/5, DF 5/5, PF 5/5        Sensory - Intact to LT bilateral     Reflexes - DTR _ / 4 , neg Barrios's b/l, neg Babinski's b/l     Coordination - FTN / HTS intact     OculoVestibular -  No nystagmus  Psychiatric - Mood stable, Affect WNL  Skin on admission: Constitutional - NAD, Comfortable  HEENT - NCAT, EOMI  Neck - Supple, No limited ROM  Chest - good chest expansion, good respiratory effort, CTAB   Cardio - warm and well perfused, RRR, no murmur  Abdomen -  Soft, NTND  Extremities - No peripheral edema, No calf tenderness   Neurologic Exam:                    Cognitive -             Orientation: Awake, Alert, AAO to self, place, year            Attention:  Days of week intact, recall 2 objects with cuing; serial 7 impaired            Memory: Recent/ remote memory intact            Thought: delayed processing     Speech - Fluent, Comprehensible, No dysarthria, No aphasia, repetition intact     Cranial Nerves - No facial asymmetry, Tongue midline, EOMI, Shoulder shrug intact     Motor -                      LEFT    UE - ShAB 5/5, EF 5/5, EE 5/5, WE 5/5,  WNL                    RIGHT UE - ShAB 5/5, EF 5/5, EE 5/5, WE 5/5,  WNL                    LEFT    LE - HF 3/5, KE 4/5, DF 5/5, PF 5/5                    RIGHT LE - HF 5/5, KE 5/5, DF 5/5, PF 5/5        Sensory - Intact to LT bilateral     Coordination -HTS intact; mild dysmetria on FTN     OculoVestibular -  No nystagmus  Psychiatric - Mood stable, Affect WNL  Skin on admission: left surgical clean incision intact with staples; right lateral hip abrasion 5cm x 2cm likely secondary to fall at home Constitutional - NAD, Comfortable  HEENT -left cranial surgical incision with staples--no drainage  Neck - Supple, No limited ROM  Chest - good chest expansion, good respiratory effort, CTAB   Cardio - warm and well perfused, RRR, no murmur  Abdomen -  Soft, NTND  Extremities - No peripheral edema, No calf tenderness   Neurologic Exam:                    Cognitive -             Orientation: Awake, Alert, AAO to self, place, year            Attention:  Impaired-- delayed processing--Difficulty completing Days of week backward, ; serial 7 impaired            Memory: Recall 0/3 spontaneously after few min.  2/3 with categorical cues.             Thought: delayed processing     Speech - Fluent, Comprehensible, No dysarthria, No aphasia, repetition intact     Cranial Nerves - PERRLA, VF intact; No facial asymmetry, Tongue midline, EOMI, Shoulder shrug intact     Motor -                      LEFT    UE - ShAB 5/5, EF 5/5, EE 5/5, WE 5/5,  WNL                    RIGHT UE - ShAB 5/5, EF 5/5, EE 5/5, WE 5/5,  WNL                    LEFT    LE - HF 3/5, KE 4/5, DF 5/5, PF 5/5                    RIGHT LE - HF 5/5, KE 5/5, DF 5/5, PF 5/5        Sensory - Intact to LT bilateral     Coordination -HTS intact; mild dysmetria on FTN     OculoVestibular -  No nystagmus  Psychiatric - Mood stable, Affect WNL  Skin on admission: left surgical clean incision intact with staples; right lateral hip abrasion 5cm x 2cm likely secondary to fall at home

## 2023-01-04 NOTE — PROGRESS NOTE ADULT - PROBLEM SELECTOR PLAN 5
On travoprost and Restasis eye drops  - c/w home drops.
On travoprost and Restasis eye drops  - c/w home drops.

## 2023-01-04 NOTE — DISCHARGE NOTE PROVIDER - NSDCCPCAREPLAN_GEN_ALL_CORE_FT
PRINCIPAL DISCHARGE DIAGNOSIS  Diagnosis: Subdural hematoma  Assessment and Plan of Treatment: On 12/29/22 patient is s/p L francis hole for SDH evacuation. Continue briviact for 7 days (last dose 1/5 pm). Continue to hold asa 81 mg in setting of SDH.  Staples to be removed on 1/8/23. You may shower and wash your hair.   Follow up with Dr López after rehab.      SECONDARY DISCHARGE DIAGNOSES  Diagnosis: Rhabdomyolysis  Assessment and Plan of Treatment: Elevated CK on admission likely secondary to fall, now downtrending. Elevated troponin on admission likely d/t acute rhabdomyolysis secondary to fall and unknown downtime. ECG with no ischemia noted. TTE unremarkable with normal LV and no WMA.  Follow up with your Primary Care Physician after rehab.    Diagnosis: Glaucoma  Assessment and Plan of Treatment: Continue eye drops as prescribed. Follow up with your Opthalmologist after rehab.    Diagnosis: HTN (hypertension)  Assessment and Plan of Treatment: Continue decreased dose of amlodipine  Follow up with your Primary Care Physician after rehab.

## 2023-01-04 NOTE — PROGRESS NOTE ADULT - ASSESSMENT
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     Fever 100.7 yesterday night, subsequently afebrile. Currently in bed, feels well, denies pain, dyspnea or cough. Able to move all extremities.

## 2023-01-04 NOTE — PROGRESS NOTE ADULT - ASSESSMENT
HPI:  82F on ASA (last took 1-2d ago), hx HTN, HLD, glaucoma. Fall yest evening or this AM (pt doesn’t remember). CTH: largely chronic L SDH (approx 2cm) w/ 8mm MLS. Exam: AOx2, PERRL, EOMI, no facial, slight R drift, LOWERY at least 4+/5.    12/29 s/p L francis hole for SDH evacuation    PLAN:  Neuro:   - neuro checks q 4 hrs  - continue briviact x 7 days - last dose 1/5 pm  - glaucoma- continue restasis and xalatan  - hold asa in setting of SDH  -  Rhabdomyolysis- Elevated CK on admission likely secondary to fall, now downtrending.  - Elevated trop on admission likely d/t acute rhabdo secondary to fall and unknown downtime. ECG with no ischemia noted. TTE unremarkable with normal LV and no WMA.  Respiratory:   - satting well on room air  CV:  - HTN- decreased amlodipine to 2.5 mg daily for soft BP  - on statin  DVT ppx:   - venodynes, sq lovenox  ID:  - Fever. Now resolved, no localizing symptoms to suggest infection. COVID PCR negative.   GI:    - on bowel regimen  PT/OT:   - Acute rehab      [facility] # [id number]

## 2023-01-04 NOTE — DISCHARGE NOTE PROVIDER - NSDCFUADDINST_GEN_ALL_CORE_FT
Return to ER for fever, chills, nausea or vomiting, severe headache or pain not relieved with pain medication, sluggishness or change in mental status, any bleeding or drainage from wound,  or any weakness of extremities. You may shower and wash your hair on post op day #4. No rubbing or scrubbing of incision. Keep incision clean and dry. Do not apply creams or lotions to incision. No driving until cleared by your surgeon. Do not return to work until cleared by surgeon. Do not take advil, aleve or ibuprofen as they can cause bleeding.

## 2023-01-04 NOTE — PROGRESS NOTE ADULT - PROBLEM SELECTOR PLAN 2
Now resolved, no localizing symptoms to suggest infection. COVID PCR negative.     If fever recurs, consider UA, CXR and RVP. Monitor.
No localizing symptoms to suggest infection. COVID PCR pending.     If fever recurs, consider UA, CXR and RVP. Monitor.

## 2023-01-04 NOTE — PROGRESS NOTE ADULT - PROBLEM SELECTOR PLAN 1
Left SDH s/p francis hole 12/29  - seizure ppx (Briviact) x 7 days  - PT/OT  - hold ASA until neurosurgically clear
Left SDH s/p francis hole 12/29  - seizure ppx (Briviact) x 7 days  - PT/OT  - hold ASA.

## 2023-01-04 NOTE — DISCHARGE NOTE PROVIDER - HOSPITAL COURSE
82F on ASA (last took 1-2d ago), hx HTN, HLD, glaucoma. Fall yesterday evening or this AM (pt doesn’t remember). CTH: largely chronic L SDH (approx 2cm) w/ 8mm MLS. Exam: AOx2, PERRL, EOMI, no facial, slight R drift, LOWERY at least 4+/5.    On 12/29/22 patient is s/p L francis hole for SDH evacuation. Continue briviact for 7 days (last dose 1/5 pm). Continue to hold asa 81 mg in setting of SDH.  Elevated CK on admission likely secondary to fall, now downtrending. Elevated troponin on admission likely d/t acute rhabdomyolysis secondary to fall and unknown downtime. ECG with no ischemia noted. TTE unremarkable with normal LV and no WMA. Amlodipine decreased to 2.5 mg (was 5 mg) for soft BP. Patient was evaluated by Physical Therapy and was recommended for Acute rehab. On day of discharge patient is medically and neurologically stable.

## 2023-01-04 NOTE — PROGRESS NOTE ADULT - PROBLEM SELECTOR PLAN 3
SBP in the 90s yesterday, Norvasc decreased, holding parameters added.     Given IV hydration given soft BP and dark urine.     Urine now clearer, BP improved. Would decrease Norvasc further ot 2.5 mg, continue holding parameters.
SBP in the 90s today. Norvasc decreased, holding parameters added.     Consider IV hydration given soft BP and dark urine. Monitor.

## 2023-01-04 NOTE — DISCHARGE NOTE PROVIDER - CARE PROVIDER_API CALL
Melva López (MD)  Neurosurgery  [address], [address]  [place] [address]  Phone: [phone]  Fax: [phone]  Follow Up Time:

## 2023-01-04 NOTE — H&P ADULT - NSHPREVIEWOFSYSTEMS_GEN_ALL_CORE
REVIEW OF SYSTEMS  Constitutional: No fever, No Chills, No fatigue  HEENT: No eye pain, No visual disturbances, No difficulty hearing  Pulm: No cough,  No shortness of breath  Cardio: No chest pain, No palpitations  GI:  No abdominal pain, No nausea, No vomiting, No diarrhea, No constipation  : No dysuria, No frequency, No hematuria  Neuro: No headaches, No memory loss, No loss of strength, No numbness, No tremors  Skin: No itching, No rashes, No lesions   Endo: No temperature intolerance  MSK: No joint pain, No joint swelling, No muscle pain, No Neck or back pain  Psych:  No depression, No anxiety REVIEW OF SYSTEMS  Constitutional: No fever, No Chills, No fatigue  HEENT: No eye pain, No visual disturbances, No difficulty hearing  Pulm: No cough,  No shortness of breath  Cardio: No chest pain, No palpitations  GI:  No abdominal pain, No nausea, No vomiting, No diarrhea, No constipation  : No dysuria, No frequency, No hematuria  Neuro: No headaches, No memory loss, + loss of strength L LE, No numbness, No tremors  Skin: No itching, No rashes  MSK: No joint pain, No joint swelling, No muscle pain, No Neck or back pain  Psych:  No depression, No anxiety REVIEW OF SYSTEMS  Constitutional: No fever, No Chills, No fatigue  HEENT: No eye pain, No visual disturbances, No difficulty hearing  Pulm: No cough,  No shortness of breath  Cardio: No chest pain, No palpitations  GI:  No abdominal pain, No nausea, No vomiting, No diarrhea, No constipation  : No dysuria, No frequency, No hematuria  Neuro: No headaches, + memory loss, + loss of strength L LE, No numbness, No tremors  Skin: No itching, No rashes  MSK: No joint pain, No joint swelling, No muscle pain, No Neck or back pain  Psych:  No depression, No anxiety

## 2023-01-04 NOTE — H&P ADULT - HISTORY OF PRESENT ILLNESS
82F on ASA (last took 1-2d ago), hx HTN, HLD, glaucoma. Fall yesterday evening or this AM (pt doesn’t remember). CTH: largely chronic L SDH (approx 2cm) w/ 8mm MLS. Exam: AOx2, PERRL, EOMI, no facial, slight R drift, LOWERY at least 4+/5.    On 12/29/22 patient is s/p L francis hole for SDH evacuation. Continue briviact for 7 days (last dose 1/5 pm). Continue to hold asa 81 mg in setting of SDH.  Elevated CK on admission likely secondary to fall, now downtrending. Elevated troponin on admission likely d/t acute rhabdomyolysis secondary to fall and unknown downtime. ECG with no ischemia noted. TTE unremarkable with normal LV and no WMA. Amlodipine decreased to 2.5 mg (was 5 mg) for soft BP. Patient was evaluated by Physical Therapy and was recommended for Acute rehab. On day of discharge patient is medically and neurologically stable.      82y female with a history of HTN, HLD, glaucoma who presented to [facility] on 12/28/22 after fall and found to have CTH with largely chronic L SDH (approx 2cm) w/ 8mm MLS.  Exam on admission was AOx2, PERRL, EOMI, no facial, slight R drift, LOWERY at least 4+/5.  On 12/29/22 patient underwent L francis hole for SDH evacuation. She was started on brivaracetam to complete a 7 day course (last dose to be 1/5 pm). Asprin continued to be held in the setting of SDH.  Hospital course notably for elevated CK and troponin  Cardiology consulted and work-up including EKG and TTE wnl.  CK downtrended throughout course and elevated CK and troponin likely secondary to acute rhabdomyolysis secondary to fall.  BP was monitored and amlodipine decreased to 2.5 mg (was 5 mg) for soft BP. Patient was evaluated by Physical Therapy and was recommended for Acute rehab to improve cognitive and physical function.

## 2023-01-04 NOTE — DISCHARGE NOTE PROVIDER - NSDCMRMEDTOKEN_GEN_ALL_CORE_FT
acetaminophen 325 mg oral tablet: 2 tab(s) orally every 6 hours, As needed,  Mild Pain (1 - 3)  amLODIPine 2.5 mg oral tablet: 1 tab(s) orally once a day  atorvastatin 20 mg oral tablet: 1 tab(s) orally once a day (at bedtime)  brivaracetam 50 mg oral tablet: 1 tab(s) orally 2 times a day (last dose 1/5 pm)  enoxaparin: 40 milligram(s) subcutaneous once a day (at bedtime)  polyethylene glycol 3350 oral powder for reconstitution: 17 gram(s) orally 2 times a day  Restasis 0.05% ophthalmic emulsion: 1 drop(s) to each affected eye every 12 hours  senna leaf extract oral tablet: 2 tab(s) orally once a day (at bedtime)  travoprost 0.004% ophthalmic solution: 1 drop(s) to each affected eye once a day (in the evening)

## 2023-01-04 NOTE — PROGRESS NOTE ADULT - SUBJECTIVE AND OBJECTIVE BOX
SUBJECTIVE: Pt seen and examined, resting comfortably in bed, no c/o HA, afebrile    OVERNIGHT EVENTS: none    Vital Signs Last 24 Hrs  T(C): 36.6 (04 Jan 2023 09:25), Max: 37.7 (04 Jan 2023 01:00)  T(F): 97.9 (04 Jan 2023 09:25), Max: 99.8 (04 Jan 2023 01:00)  HR: 75 (04 Jan 2023 09:25) (73 - 86)  BP: 101/63 (04 Jan 2023 09:25) (100/62 - 125/75)  BP(mean): --  RR: 17 (04 Jan 2023 09:25) (17 - 19)  SpO2: 96% (04 Jan 2023 09:25) (96% - 97%)    Parameters below as of 04 Jan 2023 09:25  Patient On (Oxygen Delivery Method): room air        PHYSICAL EXAM:    General: No Acute Distress     Neurological: Awake, alert oriented to person and place not date, Following Commands, PERRL, EOMI, Face Symmetrical, Speech Fluent, Moving all extremities, Muscle Strength normal in all four extremities, No Drift, Sensation to Light Touch Intact    Pulmonary: Clear to Auscultation, No Rales, No Rhonchi, No Wheezes     Cardiovascular: S1, S2, Regular Rate and Rhythm     Gastrointestinal: Soft, Nontender, Nondistended     Incision: left cranial staples c/d/i    LABS:   01-03    137  |  103  |  12  ----------------------------<  98  3.7   |  27  |  0.57    Ca    8.2<L>      03 Jan 2023 06:17    TPro  5.9<L>  /  Alb  2.6<L>  /  TBili  0.5  /  DBili  x   /  AST  57<H>  /  ALT  39  /  AlkPhos  65  01-03 01-03 @ 07:01  -  01-04 @ 07:00  --------------------------------------------------------  IN: 1340 mL / OUT: 2200 mL / NET: -860 mL      DRAINS: none    MEDICATIONS:  Antibiotics:    Neuro:  acetaminophen     Tablet .. 650 milliGRAM(s) Oral every 6 hours PRN Temp greater or equal to 38C (100.4F), Mild Pain (1 - 3)  brivaracetam  IVPB 50 milliGRAM(s) IV Intermittent two times a day  ondansetron Injectable 4 milliGRAM(s) IV Push every 6 hours PRN Nausea and/or Vomiting    Cardiac:  amLODIPine   Tablet 5 milliGRAM(s) Oral daily    Pulm:    GI/:  polyethylene glycol 3350 17 Gram(s) Oral two times a day  senna 2 Tablet(s) Oral at bedtime    Other:   atorvastatin 20 milliGRAM(s) Oral at bedtime  cycloSPORINE (RESTASIS) 0.05% Emulsion 1 Drop(s) Both EYES two times a day  enoxaparin Injectable 40 milliGRAM(s) SubCutaneous <User Schedule>  latanoprost 0.005% Ophthalmic Solution 1 Drop(s) Both EYES at bedtime    DIET: [x] Regular [] CCD [] Renal [] Puree [] Dysphagia [] Tube Feeds:     IMAGING:   < from: CT Head No Cont (12.31.22 @ 23:45) >  IMPRESSION:    Status post removal of left-sided subdural drainage catheter with stable   mildly hemorrhagic left convexity extra-axial fluid and air as compared   to prior study.    --- End of Report ---    < end of copied text >  < from: [facility] Duplex Lower Ext Vein Scan, Bilat (12.29.22 @ 15:38) >  IMPRESSION:  No evidence of deep venous thrombosis in either lower extremity.    < end of copied text >

## 2023-01-04 NOTE — DISCHARGE NOTE NURSING/CASE MANAGEMENT/SOCIAL WORK - PATIENT PORTAL LINK FT
You can access the FollowMyHealth Patient Portal offered by [facility] by registering at the following website: [email or web address]. By joining [facility]’s FollowMyHealth portal, you will also be able to view your health information using other applications (apps) compatible with our system.

## 2023-01-04 NOTE — PATIENT PROFILE ADULT - FALL HARM RISK - HARM RISK INTERVENTIONS
Assistance with ambulation/Assistance OOB with selected safe patient handling equipment/Communicate Risk of Fall with Harm to all staff/Discuss with provider need for PT consult/Monitor gait and stability/Reinforce activity limits and safety measures with patient and family/Tailored Fall Risk Interventions/Visual Cue: Yellow wristband and red socks/Bed in lowest position, wheels locked, appropriate side rails in place/Call bell, personal items and telephone in reach/Instruct patient to call for assistance before getting out of bed or chair/Non-slip footwear when patient is out of bed/[place] to call system/Physically safe environment - no spills, clutter or unnecessary equipment/Purposeful Proactive Rounding/Room/bathroom lighting operational, light cord in reach

## 2023-01-05 LAB
ALBUMIN SERPL ELPH-MCNC: 1.9 G/DL — LOW (ref 3.3–5)
ALP SERPL-CCNC: 82 U/L — SIGNIFICANT CHANGE UP (ref 40–120)
ALT FLD-CCNC: 54 U/L — HIGH (ref 10–45)
ANION GAP SERPL CALC-SCNC: 5 MMOL/L — SIGNIFICANT CHANGE UP (ref 5–17)
AST SERPL-CCNC: 52 U/L — HIGH (ref 10–40)
BASOPHILS # BLD AUTO: 0.03 K/UL — SIGNIFICANT CHANGE UP (ref 0–0.2)
BASOPHILS NFR BLD AUTO: 0.3 % — SIGNIFICANT CHANGE UP (ref 0–2)
BILIRUB SERPL-MCNC: 0.4 MG/DL — SIGNIFICANT CHANGE UP (ref 0.2–1.2)
BUN SERPL-MCNC: 8 MG/DL — SIGNIFICANT CHANGE UP (ref 7–23)
CALCIUM SERPL-MCNC: 8.4 MG/DL — SIGNIFICANT CHANGE UP (ref 8.4–10.5)
CHLORIDE SERPL-SCNC: 104 MMOL/L — SIGNIFICANT CHANGE UP (ref 96–108)
CO2 SERPL-SCNC: 31 MMOL/L — SIGNIFICANT CHANGE UP (ref 22–31)
CREAT SERPL-MCNC: 0.57 MG/DL — SIGNIFICANT CHANGE UP (ref 0.5–1.3)
EGFR: 91 ML/MIN/1.73M2 — SIGNIFICANT CHANGE UP
EOSINOPHIL # BLD AUTO: 0.15 K/UL — SIGNIFICANT CHANGE UP (ref 0–0.5)
EOSINOPHIL NFR BLD AUTO: 1.4 % — SIGNIFICANT CHANGE UP (ref 0–6)
GLUCOSE SERPL-MCNC: 83 MG/DL — SIGNIFICANT CHANGE UP (ref 70–99)
HCT VFR BLD CALC: 31.6 % — LOW (ref 34.5–45)
HGB BLD-MCNC: 10 G/DL — LOW (ref 11.5–15.5)
IMM GRANULOCYTES NFR BLD AUTO: 0.7 % — SIGNIFICANT CHANGE UP (ref 0–0.9)
LYMPHOCYTES # BLD AUTO: 0.97 K/UL — LOW (ref 1–3.3)
LYMPHOCYTES # BLD AUTO: 9.1 % — LOW (ref 13–44)
MCHC RBC-ENTMCNC: 27.5 PG — SIGNIFICANT CHANGE UP (ref 27–34)
MCHC RBC-ENTMCNC: 31.6 GM/DL — LOW (ref 32–36)
MCV RBC AUTO: 86.8 FL — SIGNIFICANT CHANGE UP (ref 80–100)
MONOCYTES # BLD AUTO: 1 K/UL — HIGH (ref 0–0.9)
MONOCYTES NFR BLD AUTO: 9.4 % — SIGNIFICANT CHANGE UP (ref 2–14)
NEUTROPHILS # BLD AUTO: 8.47 K/UL — HIGH (ref 1.8–7.4)
NEUTROPHILS NFR BLD AUTO: 79.1 % — HIGH (ref 43–77)
NRBC # BLD: 0 /100 WBCS — SIGNIFICANT CHANGE UP (ref 0–0)
PLATELET # BLD AUTO: 239 K/UL — SIGNIFICANT CHANGE UP (ref 150–400)
POTASSIUM SERPL-MCNC: 4.3 MMOL/L — SIGNIFICANT CHANGE UP (ref 3.5–5.3)
POTASSIUM SERPL-SCNC: 4.3 MMOL/L — SIGNIFICANT CHANGE UP (ref 3.5–5.3)
PROT SERPL-MCNC: 6 G/DL — SIGNIFICANT CHANGE UP (ref 6–8.3)
RBC # BLD: 3.64 M/UL — LOW (ref 3.8–5.2)
RBC # FLD: 14.1 % — SIGNIFICANT CHANGE UP (ref 10.3–14.5)
SODIUM SERPL-SCNC: 140 MMOL/L — SIGNIFICANT CHANGE UP (ref 135–145)
WBC # BLD: 10.69 K/UL — HIGH (ref 3.8–10.5)
WBC # FLD AUTO: 10.69 K/UL — HIGH (ref 3.8–10.5)

## 2023-01-05 PROCEDURE — 99232 SBSQ HOSP IP/OBS MODERATE 35: CPT

## 2023-01-05 RX ORDER — AMLODIPINE BESYLATE 2.5 MG/1
2.5 TABLET ORAL DAILY
Refills: 0 | Status: DISCONTINUED | OUTPATIENT
Start: 2023-01-06 | End: 2023-01-19

## 2023-01-05 RX ADMIN — LATANOPROST 1 DROP(S): 0.05 SOLUTION/ DROPS OPHTHALMIC; TOPICAL at 21:31

## 2023-01-05 RX ADMIN — POLYETHYLENE GLYCOL 3350 17 GRAM(S): 17 POWDER, FOR SOLUTION ORAL at 17:28

## 2023-01-05 RX ADMIN — POLYETHYLENE GLYCOL 3350 17 GRAM(S): 17 POWDER, FOR SOLUTION ORAL at 05:50

## 2023-01-05 RX ADMIN — SENNA PLUS 2 TABLET(S): 8.6 TABLET ORAL at 21:30

## 2023-01-05 RX ADMIN — AMLODIPINE BESYLATE 2.5 MILLIGRAM(S): 2.5 TABLET ORAL at 05:50

## 2023-01-05 RX ADMIN — BRIVARACETAM 50 MILLIGRAM(S): 25 TABLET, FILM COATED ORAL at 05:50

## 2023-01-05 RX ADMIN — ATORVASTATIN CALCIUM 20 MILLIGRAM(S): 80 TABLET, FILM COATED ORAL at 21:30

## 2023-01-05 RX ADMIN — ENOXAPARIN SODIUM 40 MILLIGRAM(S): 100 INJECTION SUBCUTANEOUS at 18:09

## 2023-01-05 NOTE — CONSULT NOTE ADULT - ASSESSMENT
82y female with a history of HTN, HLD, glaucoma who presented to [facility] on 12/28/22 after fall and found to have chronic left SDH s/p L francis hole SDH evacuation.     Admitted to [facility] acute inpatient rehab on 1/4/23 with cognitive deficits, and  ADL, gait, and functional impairments.     # Traumatic SDH  - Comprehensive Multidisciplinary Rehab Program: 3 hours a day, 5 days a week with PT/OT/SLP     P&O as needed  -s/p left [place] hole 12/29/22  -c/w brivaracetam to complete 7 days (last day 1/5 evening)  -staples to be removed 1/8/23  -continue to hold ASA per neurosurg    #Rhabdomyolysis - improved  -elevated CK (now downtrending) and troponin  -EKG and TTE wnl    #Glaucoma  -c/w home eyedrops    #HTN  -c/w with decreased home dose of amlodipine    #HLD  -c/w atorvastatin        # Sleep:  - Maintain low stim environment    # Pain Management:  - Tylenol PRN    # GI/Bowel:  - Senna QHS, Miralax daily     # /Bladder:   - Bladder scan x1 on admission, SC PRN  - Encourage timed voids every 4 hours while awake       # Skin/Pressure Injury:  - Skin assessment on admission:  right lateral hip abrasion 5cm x 2cm likely secondary to fall at home-- apply wound gel, cavilon to periwound and cover with allvyn dressing.   - Monitor Incisions: left surgical clean incision intact with staples;    # Diet:   - Diet Consistency/Modifications: Regular diet  - Aspiration Precautions  - SLP consult for swallow function evaluation and treatment    # DVT ppx:  - lovenox  - Last Doppler on 12/29 negative for DVT   82y female with a history of HTN, HLD, glaucoma who presented to [facility] on 12/28/22 after fall and found to have chronic left SDH s/p L francis hole SDH evacuation.     # Traumatic SDH  -s/p left Francis hole 12/29/22  -s/p brivaracetam  -staples to be removed 1/8/23  -continue to hold ASA per neurosurg    #Normocytic anemia  - Will monitor Hg/Hct, transfuse if Hg <7  - Will check iron/B12/folate with next lab draw    #Rhabdomyolysis - improved  -elevated CK (now downtrending) and troponin  -EKG and TTE wnl    #Glaucoma  -c/w home eyedrops    #HTN  -c/w amlodipine 2.5mg daily    #HLD  -c/w atorvastatin    # DVT ppx:  lovenox

## 2023-01-05 NOTE — DIETITIAN INITIAL EVALUATION ADULT - PERTINENT MEDS FT
MEDICATIONS  (STANDING):  amLODIPine   Tablet 2.5 milliGRAM(s) Oral daily  atorvastatin 20 milliGRAM(s) Oral at bedtime  brivaracetam 50 milliGRAM(s) Oral two times a day  enoxaparin Injectable 40 milliGRAM(s) SubCutaneous <User Schedule>  latanoprost 0.005% Ophthalmic Solution 1 Drop(s) Both EYES at bedtime  polyethylene glycol 3350 17 Gram(s) Oral two times a day  senna 2 Tablet(s) Oral at bedtime    MEDICATIONS  (PRN):  acetaminophen     Tablet .. 650 milliGRAM(s) Oral every 6 hours PRN Temp greater or equal to 38C (100.4F), Mild Pain (1 - 3)

## 2023-01-05 NOTE — DIETITIAN INITIAL EVALUATION ADULT - ENERGY INTAKE
----- Message from Claire Neville sent at 3/17/2020 11:49 AM CDT -----  Contact: Pt  Type:  Patient Requesting to r/s    Who Called Pt  Who Left Message for Patient:Says Radha was supposed to call her to r/s her appt since she has appts scheduled on same day  Does the patient know what this is regarding? R/s  Would the patient rather a call back or a response via [facility]? Callback  Best Call Back Number: [phone]    
Appointment rescheduled to tomorrow.   
Fair (50-75%)

## 2023-01-05 NOTE — PROGRESS NOTE ADULT - ATTENDING COMMENTS
Pt. seen with resident.  Agree with documentation above as per resident with amendments made as appropriate. Patient medically stable. Making progress towards rehab goals.       Left SDH  no acute issues.    Completed Seizure ppx with Briviact. -- stopped.   check PVRs.

## 2023-01-05 NOTE — DIETITIAN INITIAL EVALUATION ADULT - PERTINENT LABORATORY DATA
01-05    140  |  104  |  8   ----------------------------<  83  4.3   |  31  |  0.57    Ca    8.4      05 Jan 2023 08:34    TPro  6.0  /  Alb  1.9<L>  /  TBili  0.4  /  DBili  x   /  AST  52<H>  /  ALT  54<H>  /  AlkPhos  82  01-05  A1C with Estimated Average Glucose Result: 6.1 % (12-29-22 @ 03:00)

## 2023-01-05 NOTE — DIETITIAN INITIAL EVALUATION ADULT - OTHER INFO
82y female with a history of HTN, HLD, glaucoma who presented to [facility] on 12/28/22 after fall and found to have chronic left SDH s/p L francis hole SDH evacuation.  Pt seen feeding herself and eating fairly well at lunch this afternoon. She was unable to recall UBW, although lean in appearance. Receptive to trying Ensure High Protein qd to optimize intake. Denies GI distress.

## 2023-01-05 NOTE — DIETITIAN INITIAL EVALUATION ADULT - ORAL INTAKE PTA/DIET HISTORY
Pt endorses good appetite PTA- states that she was preparing meals for herself at home. Example of lunch: hummus with sae bread. Unable to provide thorough diet hx at this time due to periods of confusion. States that she is able to self feed. Denies use of oral nutrition supplement or micronutrient supplements PTA.

## 2023-01-05 NOTE — DIETITIAN NUTRITION RISK NOTIFICATION - TREATMENT: THE FOLLOWING DIET HAS BEEN RECOMMENDED
Diet, Regular:   Supplement Feeding Modality:  Oral  Ensure Plus High Protein Cans or Servings Per Day:  1       Frequency:  Daily (01-05-23 @ 13:42) [Pending Verification By Attending]  Diet, Regular (01-04-23 @ 14:31) [Active]

## 2023-01-05 NOTE — PROGRESS NOTE ADULT - ASSESSMENT
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     Admitted to [facility] acute inpatient rehab on 1/4/23 with cognitive deficits, and  ADL, gait, and functional impairments.     # Traumatic SDH  - Comprehensive Multidisciplinary Rehab Program: 3 hours a day, 5 days a week with PT/OT/SLP     P&O as needed  -s/p left [place] hole 12/29/22  -c/w brivaracetam to complete 7 days (last day 1/5 evening)  -staples to be removed 1/8/23  -continue to hold ASA per neurosurg    #Rhabdomyolysis - improved  -elevated CK (now downtrending) and troponin  -EKG and TTE wnl    #Glaucoma  -c/w home eyedrops    #HTN  -c/w with decreased home dose of amlodipine    #HLD  -c/w atorvastatin        # Sleep:  - Maintain low stim environment    # Pain Management:  - Tylenol PRN    # GI/Bowel:  - Senna QHS, Miralax daily     # /Bladder:   - Encourage timed voids every 4 hours while awake       # Skin/Pressure Injury:  - Skin assessment on admission:  right lateral hip abrasion 5cm x 2cm likely secondary to fall at home-- apply wound gel, cavilon to periwound and cover with allvyn dressing.   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     Admitted to [place] acute inpatient rehab on 1/4/23 with cognitive deficits, and  ADL, gait, and functional impairments.     # Traumatic SDH  - Comprehensive Multidisciplinary Rehab Program: 3 hours a day, 5 days a week with PT/OT/SLP     P&O as needed  -s/p left [place] hole 12/29/22  --Seizure ppx:  brivaracetam to completed 7 days --discontinued  -staples to be removed 1/8/23  -continue to hold ASA per neurosurg    #Rhabdomyolysis - improved  -elevated CK (now downtrending) and troponin  -EKG and TTE wnl    #Glaucoma  -c/w home eyedrops    #HTN  -c/w with decreased home dose of amlodipine    #HLD  -c/w atorvastatin      # Sleep:  - Maintain low stim environment    # Pain Management:  - Tylenol PRN    # GI/Bowel:  - Senna QHS, Miralax daily     # /Bladder:   --check PVRs  - Encourage timed voids every 4 hours while awake       # Skin/Pressure Injury:  - Skin assessment on admission:  right lateral hip abrasion 5cm x 2cm likely secondary to fall at home-- apply wound gel, cavilon to periwound and cover with allvyn dressing.   - Monitor Incisions: left surgical clean incision intact with staples (to be removed 1/8)    # Diet:   - Diet Consistency/Modifications: Regular diet  - Aspiration Precautions  - c/w  SLP treatment    # DVT ppx:  - lovenox  - Last Doppler on 12/29 negative for DVT    # Restrictions/Precautions:  - Precautions: falls, seizures, aspiration

## 2023-01-05 NOTE — CONSULT NOTE ADULT - SUBJECTIVE AND OBJECTIVE BOX
82y female with a history of HTN, HLD, glaucoma who presented to [facility] on 12/28/22 after fall and found to have CTH with largely chronic L SDH (approx 2cm) w/ 8mm MLS.  Exam on admission was AOx2, PERRL, EOMI, no facial, slight R drift, LOWERY at least 4+/5.  On 12/29/22 patient underwent L francis hole for SDH evacuation. She was started on brivaracetam to complete a 7 day course (last dose to be 1/5 pm). Asprin continued to be held in the setting of SDH.  Hospital course notably for elevated CK and troponin  Cardiology consulted and work-up including EKG and TTE wnl.  CK downtrended throughout course and elevated CK and troponin likely secondary to acute rhabdomyolysis secondary to fall.  BP was monitored and amlodipine decreased to 2.5 mg (was 5 mg) for soft BP.         PAST MEDICAL & SURGICAL HISTORY:  HLD (hyperlipidemia)  HTN (hypertension)  S/P hysterectomy  Benign teratoma    FAMILY HISTORY  Father: - at age - with history of   Mother: - at age - with history of     SOCIAL HISTORY  Substance Use (street drugs): (  ) never used  (  ) other:  Tobacco Usage:  (   ) never smoked   (   ) former smoker   (   ) current smoker  (     ) pack year  Alcohol Usage:  Sexual History:   Recent Travel:    Allergies  hydrochlorothiazide (Unknown)  Intolerances    acetaminophen     Tablet .. 650 milliGRAM(s) Oral every 6 hours PRN  amLODIPine   Tablet 2.5 milliGRAM(s) Oral daily  atorvastatin 20 milliGRAM(s) Oral at bedtime  brivaracetam 50 milliGRAM(s) Oral two times a day  enoxaparin Injectable 40 milliGRAM(s) SubCutaneous <User Schedule>  latanoprost 0.005% Ophthalmic Solution 1 Drop(s) Both EYES at bedtime  polyethylene glycol 3350 17 Gram(s) Oral two times a day  senna 2 Tablet(s) Oral at bedtime      REVIEW OF SYSTEMS:  CONSTITUTIONAL: No fever, weight loss, or fatigue  EYES: No eye pain, visual disturbances, or discharge  ENMT:  No difficulty hearing, tinnitus, vertigo; No sinus or throat pain  NECK: No pain or stiffness  BREASTS: No pain, masses, or nipple discharge  RESPIRATORY: No cough, wheezing, chills or hemoptysis; No shortness of breath  CARDIOVASCULAR: No chest pain, palpitations, dizziness, or leg swelling  GASTROINTESTINAL: No abdominal or epigastric pain. No nausea, vomiting, or hematemesis; No diarrhea or constipation. No melena or hematochezia.  GENITOURINARY: No dysuria, frequency, hematuria, or incontinence  NEUROLOGICAL: No headaches, memory loss, loss of strength, numbness, or tremors  SKIN: No itching, burning, rashes, or lesions   LYMPH NODES: No enlarged glands  ENDOCRINE: No heat or cold intolerance; No hair loss  MUSCULOSKELETAL: No joint pain or swelling; No muscle, back, or extremity pain  PSYCHIATRIC: No depression, anxiety, mood swings, or difficulty sleeping  HEME/LYMPH: No easy bruising, or bleeding gums  ALLERY AND IMMUNOLOGIC: No hives or eczema    ALL ROS REVIEWED AND NORMAL EXCEPT AS STATED ABOVE    T(C): 36.8 (01-04-23 @ 21:10), Max: 37.2 (01-04-23 @ 14:22)  HR: 75 (01-04-23 @ 21:10) (75 - 83)  BP: 107/63 (01-04-23 @ 21:10) (101/63 - 115/64)  RR: 16 (01-04-23 @ 21:10) (16 - 19)  SpO2: 97% (01-04-23 @ 21:10) (95% - 98%)  Wt(kg): --Vital Signs Last 24 Hrs  T(C): 36.8 (04 Jan 2023 21:10), Max: 37.2 (04 Jan 2023 14:22)  T(F): 98.2 (04 Jan 2023 21:10), Max: 98.9 (04 Jan 2023 14:22)  HR: 75 (04 Jan 2023 21:10) (75 - 83)  BP: 107/63 (04 Jan 2023 21:10) (101/63 - 115/64)  BP(mean): --  RR: 16 (04 Jan 2023 21:10) (16 - 19)  SpO2: 97% (04 Jan 2023 21:10) (95% - 98%)    Parameters below as of 04 Jan 2023 21:10  Patient On (Oxygen Delivery Method): room air    PHYSICAL EXAM:  GENERAL: NAD, well-groomed, well-developed  HEAD:  Atraumatic, Normocephalic  EYES: EOMI, PERRLA, conjunctiva and sclera clear  ENMT: No tonsillar erythema, exudates, or enlargement; Moist mucous membranes, Good dentition, No lesions  NECK: Supple, No JVD, Normal thyroid  NERVOUS SYSTEM:  Alert & Oriented X3, Good concentration; Motor Strength 5/5 B/L upper and lower extremities; DTRs 2+ intact and symmetric  CHEST/LUNG: Clear to percussion bilaterally; No rales, rhonchi, wheezing, or rubs  HEART: Regular rate and rhythm; No murmurs, rubs, or gallops  ABDOMEN: Soft, Nontender, Nondistended; Bowel sounds present  EXTREMITIES:  2+ Peripheral Pulses, No clubbing, cyanosis, or edema  LYMPH: No lymphadenopathy noted  SKIN: No rashes or lesions    LABS:  CAPILLARY BLOOD GLUCOSE    RADIOLOGY & ADDITIONAL TESTS:    CT Head No Cont (12.31.22 @ 23:45) >  IMPRESSION:  Status post removal of left-sided subdural drainage catheter with stable   mildly hemorrhagic left convexity extra-axial fluid and air as compared   to prior study.    Consultant(s) Notes Reviewed:  [x ] YES  [ ] NO  Care Discussed with Consultants/Other Providers [ x] YES  [ ] NO  Imaging Personally Reviewed:  [ ] YES  [ x] NO 82y female with a history of HTN, HLD, glaucoma who presented to [facility] on 12/28/22 after fall and found to have CTH with largely chronic L SDH (approx 2cm) w/ 8mm MLS.  Exam on admission was AOx2, PERRL, EOMI, no facial, slight R drift, LOWERY at least 4+/5.  On 12/29/22 patient underwent L francis hole for SDH evacuation. She was started on brivaracetam to complete a 7 day course (last dose to be 1/5 pm). Asprin continued to be held in the setting of SDH.  Hospital course notably for elevated CK and troponin  Cardiology consulted and work-up including EKG and TTE wnl.  CK downtrended throughout course and elevated CK and troponin likely secondary to acute rhabdomyolysis secondary to fall.  BP was monitored and amlodipine decreased to 2.5 mg (was 5 mg) for soft BP.     Patient is not a reliable source of information.    She is tolerating a diet; she a BM yesterday    PAST MEDICAL & SURGICAL HISTORY:  HLD (hyperlipidemia)  HTN (hypertension)  S/P hysterectomy  Benign teratoma    FAMILY HISTORY  I cannot obtain information; patient not a reliable source of information    SOCIAL HISTORY  Substance Use (street drugs): ( x ) never used  (  ) other:  Tobacco Usage:  ( x  ) never smoked   (   ) former smoker   (   ) current smoker  (     ) pack year  Alcohol Usage:Denies  Sexual History: Denies  Recent Travel:Denies    Allergies  hydrochlorothiazide (Unknown)  Intolerances    acetaminophen     Tablet .. 650 milliGRAM(s) Oral every 6 hours PRN  amLODIPine   Tablet 2.5 milliGRAM(s) Oral daily  atorvastatin 20 milliGRAM(s) Oral at bedtime  brivaracetam 50 milliGRAM(s) Oral two times a day  enoxaparin Injectable 40 milliGRAM(s) SubCutaneous <User Schedule>  latanoprost 0.005% Ophthalmic Solution 1 Drop(s) Both EYES at bedtime  polyethylene glycol 3350 17 Gram(s) Oral two times a day  senna 2 Tablet(s) Oral at bedtime      REVIEW OF SYSTEMS:  CONSTITUTIONAL: No fever, weight loss, or fatigue  EYES: No eye pain, visual disturbances, or discharge  ENMT:  No difficulty hearing, tinnitus, vertigo; No sinus or throat pain  NECK: No pain or stiffness  BREASTS: No pain, masses, or nipple discharge  RESPIRATORY: No cough, wheezing, chills or hemoptysis; No shortness of breath  CARDIOVASCULAR: No chest pain, palpitations, dizziness, or leg swelling  GASTROINTESTINAL: No abdominal or epigastric pain. No nausea, vomiting, or hematemesis; No diarrhea or constipation. No melena or hematochezia.  GENITOURINARY: No dysuria, frequency, hematuria, or incontinence  NEUROLOGICAL: No headaches, memory loss, loss of strength, numbness, or tremors  SKIN: No itching, burning, rashes, or lesions   LYMPH NODES: No enlarged glands  ENDOCRINE: No heat or cold intolerance; No hair loss  MUSCULOSKELETAL: No joint pain or swelling; No muscle, back, or extremity pain  PSYCHIATRIC: No depression, anxiety, mood swings, or difficulty sleeping  HEME/LYMPH: No easy bruising, or bleeding gums  ALLERY AND IMMUNOLOGIC: No hives or eczema    ALL ROS REVIEWED AND NORMAL EXCEPT AS STATED ABOVE    T(C): 36.8 (01-04-23 @ 21:10), Max: 37.2 (01-04-23 @ 14:22)  HR: 75 (01-04-23 @ 21:10) (75 - 83)  BP: 107/63 (01-04-23 @ 21:10) (101/63 - 115/64)  RR: 16 (01-04-23 @ 21:10) (16 - 19)  SpO2: 97% (01-04-23 @ 21:10) (95% - 98%)  Wt(kg): --Vital Signs Last 24 Hrs  T(C): 36.8 (04 Jan 2023 21:10), Max: 37.2 (04 Jan 2023 14:22)  T(F): 98.2 (04 Jan 2023 21:10), Max: 98.9 (04 Jan 2023 14:22)  HR: 75 (04 Jan 2023 21:10) (75 - 83)  BP: 107/63 (04 Jan 2023 21:10) (101/63 - 115/64)  BP(mean): --  RR: 16 (04 Jan 2023 21:10) (16 - 19)  SpO2: 97% (04 Jan 2023 21:10) (95% - 98%)    Parameters below as of 04 Jan 2023 21:10  Patient On (Oxygen Delivery Method): room air    PHYSICAL EXAM:  GENERAL: NAD, forgetful, speaking in full sentences, follows simple commands, well-groomed, well-developed  HEAD:  Atraumatic, Normocephalic  EYES: EOMI, PERRLA, conjunctiva and sclera clear  ENMT: No tonsillar erythema, exudates, or enlargement; Moist mucous membranes, Good dentition, No lesions  NECK: Supple, No JVD, Normal thyroid  NERVOUS SYSTEM:  Alert & Oriented X3, Good concentration; Motor Strength 5/5 B/L upper and lower extremities; DTRs 2+ intact and symmetric  CHEST/LUNG: Clear to percussion bilaterally; No rales, rhonchi, wheezing, or rubs  HEART: Regular rate and rhythm; No murmurs, rubs, or gallops  ABDOMEN: Soft, Nontender, Nondistended; Bowel sounds present  EXTREMITIES:  2+ Peripheral Pulses, No clubbing, cyanosis, or edema  LYMPH: No lymphadenopathy noted  SKIN: No rashes or lesions    LABS:  CAPILLARY BLOOD GLUCOSE    RADIOLOGY & ADDITIONAL TESTS:    CT Head No Cont (12.31.22 @ 23:45) >  IMPRESSION:  Status post removal of left-sided subdural drainage catheter with stable   mildly hemorrhagic left convexity extra-axial fluid and air as compared   to prior study.    Consultant(s) Notes Reviewed:  [x ] YES  [ ] NO  Care Discussed with Consultants/Other Providers [ x] YES  [ ] NO  Imaging Personally Reviewed:  [ ] YES  [ x] NO

## 2023-01-05 NOTE — PROGRESS NOTE ADULT - SUBJECTIVE AND OBJECTIVE BOX
Patient is a 82y old  Female who presents with a chief complaint of Traumatic SDH (2023 07:13)      HPI:  82y female with a history of HTN, HLD, glaucoma who presented to [facility] on 22 after fall and found to have CTH with largely chronic L SDH (approx 2cm) w/ 8mm MLS.  Exam on admission was AOx2, PERRL, EOMI, no facial, slight R drift, LOWERY at least 4+/5.  On 22 patient underwent L francis hole for SDH evacuation. She was started on brivaracetam to complete a 7 day course (last dose to be 1/5 pm). Asprin continued to be held in the setting of SDH.  Hospital course notably for elevated CK and troponin  Cardiology consulted and work-up including EKG and TTE wnl.  CK downtrended throughout course and elevated CK and troponin likely secondary to acute rhabdomyolysis secondary to fall.  BP was monitored and amlodipine decreased to 2.5 mg (was 5 mg) for soft BP. Patient was evaluated by Physical Therapy and was recommended for Acute rehab to improve cognitive and physical function.   (2023 14:18)      PAST MEDICAL & SURGICAL HISTORY:  HLD (hyperlipidemia)      HTN (hypertension)      S/P hysterectomy      Benign teratoma          Allergies    hydrochlorothiazide (Unknown)    Intolerances          PHYSICAL EXAM    VITALS  82y  Vital Signs Last 24 Hrs  T(C): 37.1 (2023 08:20), Max: 37.2 (2023 14:22)  T(F): 98.7 (2023 08:20), Max: 98.9 (2023 14:22)  HR: 70 (2023 08:20) (70 - 83)  BP: 115/70 (2023 08:20) (101/63 - 115/70)  BP(mean): --  RR: 16 (2023 08:20) (16 - 19)  SpO2: 99% (2023 08:20) (95% - 99%)    Parameters below as of 2023 08:20  Patient On (Oxygen Delivery Method): room air      Daily Height in cm: 170.18 (2023 14:22)    Daily Weight in k.9 (2023 23:14)        RECENT LABS:                      CAPILLARY BLOOD GLUCOSE          MEDICATIONS  (STANDING):  amLODIPine   Tablet 2.5 milliGRAM(s) Oral daily  atorvastatin 20 milliGRAM(s) Oral at bedtime  brivaracetam 50 milliGRAM(s) Oral two times a day  enoxaparin Injectable 40 milliGRAM(s) SubCutaneous <User Schedule>  latanoprost 0.005% Ophthalmic Solution 1 Drop(s) Both EYES at bedtime  polyethylene glycol 3350 17 Gram(s) Oral two times a day  senna 2 Tablet(s) Oral at bedtime    MEDICATIONS  (PRN):  acetaminophen     Tablet .. 650 milliGRAM(s) Oral every 6 hours PRN Temp greater or equal to 38C (100.4F), Mild Pain (1 - 3)           Patient is a 82y old  Female who presents with a chief complaint of Traumatic SDH (2023 07:13)      HPI:  82y female with a history of HTN, HLD, glaucoma who presented to [facility] on 22 after fall and found to have CTH with largely chronic L SDH (approx 2cm) w/ 8mm MLS.  Exam on admission was AOx2, PERRL, EOMI, no facial, slight R drift, LOWERY at least 4+/5.  On 22 patient underwent L francis hole for SDH evacuation. She was started on brivaracetam to complete a 7 day course (last dose to be 1/5 pm). Asprin continued to be held in the setting of SDH.  Hospital course notably for elevated CK and troponin  Cardiology consulted and work-up including EKG and TTE wnl.  CK downtrended throughout course and elevated CK and troponin likely secondary to acute rhabdomyolysis secondary to fall.  BP was monitored and amlodipine decreased to 2.5 mg (was 5 mg) for soft BP. Patient was evaluated by Physical Therapy and was recommended for Acute rehab to improve cognitive and physical function.   (2023 14:18)      SUBJECTIVE HISTORY:  Patient seen and evaluated at bedside on morning rounds.  Patient without complaints.  She cannot recall her last BM.   States slept well overnight    REVIEW OF SYSTEMS:  denies abdominal pain/discomfort  +constipation      PHYSICAL EXAM  Constitutional - NAD, Comfortable  HEENT -left cranial surgical incision with staples--no drainage  Neck - Supple, No limited ROM  Chest - good chest expansion, good respiratory effort, CTAB   Cardio - warm and well perfused, RRR, no murmur  Abdomen -  Soft, NTND  Extremities - No peripheral edema, No calf tenderness   Neurologic Exam:                    Cognitive -             Orientation: Awake, Alert, AAO to self, place, year            Attention:  Impaired-- delayed processing--Difficulty completing Days of week backward, ; serial 7 impaired            Memory: Recall 0/3 spontaneously after few min.  2/3 with categorical cues.             Thought: delayed processing     Speech - Fluent, Comprehensible, No dysarthria, No aphasia, repetition intact     Cranial Nerves - PERRLA, VF intact; No facial asymmetry, Tongue midline, EOMI, Shoulder shrug intact     Motor -                      LEFT    UE - ShAB 5/5, EF 5/5, EE 5/5, WE 5/5,  WNL                    RIGHT UE - ShAB 5/5, EF 5/5, EE 5/5, WE 5/5,  WNL                    LEFT    LE - HF 3/5, KE 4/5, DF 5/5, PF 5/5                    RIGHT LE - HF 5/5, KE 5/5, DF 5/5, PF 5/5        Coordination -HTS intact; mild dysmetria on FTN     OculoVestibular -  No nystagmus  Psychiatric - Mood stable, Affect WNL  Skin on admission: left surgical clean incision intact with staples; right lateral hip abrasion 5cm x 2cm likely secondary to fall at home        VITALS  82y  Vital Signs Last 24 Hrs  T(C): 37.1 (2023 08:20), Max: 37.2 (2023 14:22)  T(F): 98.7 (2023 08:20), Max: 98.9 (2023 14:22)  HR: 70 (2023 08:20) (70 - 83)  BP: 115/70 (2023 08:20) (101/63 - 115/70)  BP(mean): --  RR: 16 (2023 08:20) (16 - 19)  SpO2: 99% (2023 08:20) (95% - 99%)    Parameters below as of 2023 08:20  Patient On (Oxygen Delivery Method): room air      Daily Height in cm: 170.18 (2023 14:22)    Daily Weight in k.9 (2023 23:14)        RECENT LABS:      140  |  104  |  8   ----------------------------<  83  4.3   |  31  |  0.57      137  |  103  |  12  ----------------------------<  98  3.7   |  27  |  0.57    Ca    8.4      2023 08:34  Ca    8.2<L>      2023 06:17    TPro  6.0  /  Alb  1.9<L>  /  TBili  0.4  /  DBili  x   /  AST  52<H>  /  ALT  54<H>  /  AlkPhos  82    TPro  5.9<L>  /  Alb  2.6<L>  /  TBili  0.5  /  DBili  x   /  AST  57<H>  /  ALT  39  /  AlkPhos  65                                                10.0   10.69 )-----------( 239      ( 2023 08:34 )             31.6     CAPILLARY BLOOD GLUCOSE            MEDICATIONS  (STANDING):  amLODIPine   Tablet 2.5 milliGRAM(s) Oral daily  atorvastatin 20 milliGRAM(s) Oral at bedtime  brivaracetam 50 milliGRAM(s) Oral two times a day  enoxaparin Injectable 40 milliGRAM(s) SubCutaneous <User Schedule>  latanoprost 0.005% Ophthalmic Solution 1 Drop(s) Both EYES at bedtime  polyethylene glycol 3350 17 Gram(s) Oral two times a day  senna 2 Tablet(s) Oral at bedtime    MEDICATIONS  (PRN):  acetaminophen     Tablet .. 650 milliGRAM(s) Oral every 6 hours PRN Temp greater or equal to 38C (100.4F), Mild Pain (1 - 3)

## 2023-01-06 PROCEDURE — 99232 SBSQ HOSP IP/OBS MODERATE 35: CPT

## 2023-01-06 RX ADMIN — ATORVASTATIN CALCIUM 20 MILLIGRAM(S): 80 TABLET, FILM COATED ORAL at 21:30

## 2023-01-06 RX ADMIN — POLYETHYLENE GLYCOL 3350 17 GRAM(S): 17 POWDER, FOR SOLUTION ORAL at 05:12

## 2023-01-06 RX ADMIN — LATANOPROST 1 DROP(S): 0.05 SOLUTION/ DROPS OPHTHALMIC; TOPICAL at 21:30

## 2023-01-06 RX ADMIN — AMLODIPINE BESYLATE 2.5 MILLIGRAM(S): 2.5 TABLET ORAL at 05:12

## 2023-01-06 RX ADMIN — SENNA PLUS 2 TABLET(S): 8.6 TABLET ORAL at 21:30

## 2023-01-06 RX ADMIN — ENOXAPARIN SODIUM 40 MILLIGRAM(S): 100 INJECTION SUBCUTANEOUS at 18:20

## 2023-01-06 NOTE — PROGRESS NOTE ADULT - ATTENDING COMMENTS
Pt. seen with resident.  Agree with documentation above as per resident with amendments made as appropriate. Patient medically stable. Making progress towards rehab goals.     TBI--  SDH-- Stable. Pt. seen with resident.  Agree with documentation above as per resident with amendments made as appropriate. Patient medically stable. Making progress towards rehab goals.     TBI--  SDH-- Stable.  Neuroendocrine w/u-  --Check AM cortisol and TSH and free T4 with routine labs 1/9 AM

## 2023-01-06 NOTE — PROGRESS NOTE ADULT - ASSESSMENT
82y female with a history of HTN, HLD, glaucoma who presented to [facility] on 12/28/22 after fall and found to have chronic left SDH s/p L francis hole SDH evacuation.     # Traumatic SDH  -s/p left Francis hole 12/29/22  -s/p brivaracetam  -staples to be removed 1/8/23  -continue to hold ASA per neurosurg    #Normocytic anemia  - Will monitor Hg/Hct, transfuse if Hg <7  - Iron/B12/folate pending    #Rhabdomyolysis - improved  -elevated CK (now downtrending) and troponin  -EKG and TTE wnl    #Glaucoma  -c/w home eyedrops    #HTN  -c/w amlodipine 2.5mg daily    #HLD  -c/w atorvastatin    # DVT ppx:  lovenox

## 2023-01-06 NOTE — PROGRESS NOTE ADULT - SUBJECTIVE AND OBJECTIVE BOX
Patient is a 82y old  Female who presents with a chief complaint of Traumatic subdural hemorrhage without loss of consciousness, initial encounter     (2023 13:27)      HPI:  82y female with a history of HTN, HLD, glaucoma who presented to [facility] on 22 after fall and found to have CTH with largely chronic L SDH (approx 2cm) w/ 8mm MLS.  Exam on admission was AOx2, PERRL, EOMI, no facial, slight R drift, LOWERY at least 4+/5.  On 22 patient underwent L francis hole for SDH evacuation. She was started on brivaracetam to complete a 7 day course (last dose to be 1/5 pm). Asprin continued to be held in the setting of SDH.  Hospital course notably for elevated CK and troponin  Cardiology consulted and work-up including EKG and TTE wnl.  CK downtrended throughout course and elevated CK and troponin likely secondary to acute rhabdomyolysis secondary to fall.  BP was monitored and amlodipine decreased to 2.5 mg (was 5 mg) for soft BP. Patient was evaluated by Physical Therapy and was recommended for Acute rehab to improve cognitive and physical function.   (2023 14:18)      SUBJECTIVE HISTORY:  Patient seen and evaluated at bedside.  She reports sleeping well and denies pain.  She is without complaints.    REVIEW OF SYSTEMS:  denies HA, abdominal pain, n/v    PHYSICAL EXAM  Constitutional - NAD, Comfortable  HEENT -left cranial surgical incision with staples--no drainage  Chest - good chest expansion, good respiratory effort, CTAB   Cardio - warm and well perfused, RRR, no murmur  Abdomen -  Soft, NTND  Extremities - No peripheral edema, No calf tenderness   Neurologic Exam:                    Cognitive -             Orientation: Awake, Alert, AAO to self, place, year            Attention:  Impaired-- delayed processing; difficult reading comprehension     Speech - Fluent, Comprehensible, No dysarthria, No aphasia, repetition intact     Motor -                      LEFT    UE - ShAB 5/5, EF 5/5, EE 5/5, WE 5/5,  WNL                    RIGHT UE - ShAB 5/5, EF 5/5, EE 5/5, WE 5/5,  WNL                    LEFT    LE - HF 3/5, KE 4/5, DF 5/5, PF 5/5                    RIGHT LE - HF 5/5, KE 5/5, DF 5/5, PF 5/5        Coordination -HTS intact; mild dysmetria on FTN     OculoVestibular -  No nystagmus  Psychiatric - Mood stable, Affect WNL  Skin on admission: left surgical clean incision intact with staples; right lateral hip abrasion 5cm x 2cm likely secondary to fall at home    VITALS  82y  Vital Signs Last 24 Hrs  T(C): 36.7 (2023 07:53), Max: 36.7 (2023 07:53)  T(F): 98 (2023 07:53), Max: 98 (2023 07:53)  HR: 62 (2023 07:53) (62 - 85)  BP: 125/67 (2023 07:53) (107/63 - 125/67)  BP(mean): --  RR: 16 (2023 07:53) (15 - 16)  SpO2: 95% (2023 07:53) (95% - 100%)    Parameters below as of 2023 07:53  Patient On (Oxygen Delivery Method): room air      Daily     Daily Weight in k.9 (2023 22:28)        RECENT LABS:                          10.0   10.69 )-----------( 239      ( 2023 08:34 )             31.6     01-05    140  |  104  |  8   ----------------------------<  83  4.3   |  31  |  0.57    Ca    8.4      2023 08:34    TPro  6.0  /  Alb  1.9<L>  /  TBili  0.4  /  DBili  x   /  AST  52<H>  /  ALT  54<H>  /  AlkPhos  82  01-05    LIVER FUNCTIONS - ( 2023 08:34 )  Alb: 1.9 g/dL / Pro: 6.0 g/dL / ALK PHOS: 82 U/L / ALT: 54 U/L / AST: 52 U/L / GGT: x                   CAPILLARY BLOOD GLUCOSE          MEDICATIONS  (STANDING):  amLODIPine   Tablet 2.5 milliGRAM(s) Oral daily  atorvastatin 20 milliGRAM(s) Oral at bedtime  enoxaparin Injectable 40 milliGRAM(s) SubCutaneous <User Schedule>  latanoprost 0.005% Ophthalmic Solution 1 Drop(s) Both EYES at bedtime  polyethylene glycol 3350 17 Gram(s) Oral two times a day  senna 2 Tablet(s) Oral at bedtime    MEDICATIONS  (PRN):  acetaminophen     Tablet .. 650 milliGRAM(s) Oral every 6 hours PRN Temp greater or equal to 38C (100.4F), Mild Pain (1 - 3)

## 2023-01-06 NOTE — PROGRESS NOTE ADULT - ASSESSMENT
82y female with a history of HTN, HLD, glaucoma who presented to [facility] on 12/28/22 after fall and found to have chronic left SDH s/p L francis hole SDH evacuation.     Admitted to [place] acute inpatient rehab on 1/4/23 with cognitive deficits, and  ADL, gait, and functional impairments.     # Traumatic SDH  - Comprehensive Multidisciplinary Rehab Program: 3 hours a day, 5 days a week with PT/OT/SLP     P&O as needed  -s/p left [place] hole 12/29/22  --Seizure ppx:  brivaracetam to completed 7 days --discontinued  -staples to be removed 1/8/23  -continue to hold ASA per neurosurg    #Rhabdomyolysis - improved  -elevated CK (now downtrending) and troponin  -EKG and TTE wnl    #Glaucoma  -c/w home eyedrops    #HTN  -c/w with decreased home dose of amlodipine    #HLD  -c/w atorvastatin      # Sleep:  - Maintain low stim environment    # Pain Management:  - Tylenol PRN    # GI/Bowel:  - Senna QHS, Miralax daily     # /Bladder:   --dc'd PVR  - Encourage timed voids every 4 hours while awake       # Skin/Pressure Injury:  - Skin assessment on admission:  right lateral hip abrasion 5cm x 2cm likely secondary to fall at home-- apply wound gel, cavilon to periwound and cover with allvyn dressing.   - Monitor Incisions: left surgical clean incision intact with staples (to be removed 1/8)    # Diet:   - Diet Consistency/Modifications: Regular diet  - Aspiration Precautions  - c/w  SLP treatment    # DVT ppx:  - lovenox  - Last Doppler on 12/29 negative for DVT    # Restrictions/Precautions:  - Precautions: falls, seizures, aspiration     82y female with a history of HTN, HLD, glaucoma who presented to [facility] on 12/28/22 after fall and found to have chronic left SDH s/p L francis hole SDH evacuation.     Admitted to [place] acute inpatient rehab on 1/4/23 with cognitive deficits, and  ADL, gait, and functional impairments.     # Traumatic SDH  - Comprehensive Multidisciplinary Rehab Program: 3 hours a day, 5 days a week with PT/OT/SLP     P&O as needed  -s/p left [place] hole 12/29/22  --Seizure ppx:  brivaracetam to completed 7 days --discontinued 1/5  -staples to be removed 1/8/23  -continue to hold ASA per neurosurg    #Rhabdomyolysis - improved  -elevated CK (now downtrending) and troponin  -EKG and TTE wnl    #Glaucoma  -c/w home eyedrops    #HTN  -c/w with decreased home dose of amlodipine    #HLD  -c/w atorvastatin      # Sleep:  - Maintain low stim environment    # Pain Management:  - Tylenol PRN    # GI/Bowel:  - Senna QHS, Miralax daily     # /Bladder:   --dc'd PVR  - Encourage timed voids every 4 hours while awake       # Skin/Pressure Injury:  - Skin assessment on admission:  right lateral hip abrasion 5cm x 2cm likely secondary to fall at home-- apply wound gel, cavilon to periwound and cover with allvyn dressing.   - Monitor Incisions: left surgical clean incision intact with staples (to be removed 1/8)    # Diet:   - Diet Consistency/Modifications: Regular diet  - Aspiration Precautions  - c/w  SLP treatment    # DVT ppx:  - lovenox  - Last Doppler on 12/29 negative for DVT    # Restrictions/Precautions:  - Precautions: falls, seizures, aspiration     82y female with a history of HTN, HLD, glaucoma who presented to [facility] on 12/28/22 after fall and found to have chronic left SDH s/p L francis hole SDH evacuation.     Admitted to [place] acute inpatient rehab on 1/4/23 with cognitive deficits, and  ADL, gait, and functional impairments.     # Traumatic SDH  - Comprehensive Multidisciplinary Rehab Program: 3 hours a day, 5 days a week with PT/OT/SLP     P&O as needed  -s/p left [place] hole 12/29/22  --Seizure ppx:  brivaracetam to completed 7 days --discontinued 1/5  -staples to be removed 1/8/23  -continue to hold ASA per neurosurg    Neuroendocrine w/u-  --Check AM cortisol and TSH and free T4 with routine labs 1/9 AM    #Rhabdomyolysis - improved  -elevated CK (now downtrending) and troponin  -EKG and TTE wnl    #Glaucoma  -c/w home eyedrops    #HTN  -c/w with decreased home dose of amlodipine    #HLD  -c/w atorvastatin      # Sleep:  - Maintain low stim environment    # Pain Management:  - Tylenol PRN    # GI/Bowel:  - Senna QHS, Miralax daily     # /Bladder:   --dc'd PVR  - Encourage timed voids every 4 hours while awake       # Skin/Pressure Injury:  - Skin assessment on admission:  right lateral hip abrasion 5cm x 2cm likely secondary to fall at home-- apply wound gel, cavilon to periwound and cover with allvyn dressing.   - Monitor Incisions: left surgical clean incision intact with staples (to be removed 1/8)    # Diet:   - Diet Consistency/Modifications: Regular diet  - Aspiration Precautions  - c/w  SLP treatment    # DVT ppx:  - lovenox  - Last Doppler on 12/29 negative for DVT    # Restrictions/Precautions:  - Precautions: falls, seizures, aspiration

## 2023-01-06 NOTE — PROGRESS NOTE ADULT - SUBJECTIVE AND OBJECTIVE BOX
Patient is a 82y old  Female who presents with a chief complaint of Traumatic SDH (06 Jan 2023 11:04)    No events overnight  Denies chest pain, SOB    Patient seen and examined at bedside.    ALLERGIES:  hydrochlorothiazide (Unknown)    MEDICATIONS  (STANDING):  amLODIPine   Tablet 2.5 milliGRAM(s) Oral daily  atorvastatin 20 milliGRAM(s) Oral at bedtime  enoxaparin Injectable 40 milliGRAM(s) SubCutaneous <User Schedule>  latanoprost 0.005% Ophthalmic Solution 1 Drop(s) Both EYES at bedtime  polyethylene glycol 3350 17 Gram(s) Oral two times a day  senna 2 Tablet(s) Oral at bedtime    MEDICATIONS  (PRN):  acetaminophen     Tablet .. 650 milliGRAM(s) Oral every 6 hours PRN Temp greater or equal to 38C (100.4F), Mild Pain (1 - 3)    Vital Signs Last 24 Hrs  T(F): 98 (06 Jan 2023 07:53), Max: 98 (06 Jan 2023 07:53)  HR: 62 (06 Jan 2023 07:53) (62 - 85)  BP: 125/67 (06 Jan 2023 07:53) (107/63 - 125/67)  RR: 16 (06 Jan 2023 07:53) (15 - 16)  SpO2: 95% (06 Jan 2023 07:53) (95% - 100%)  I&O's Summary    BMI (kg/m2): 23.8 (01-04-23 @ 14:22)  PHYSICAL EXAM:  General: NAD, A/O, speaking in full sentences, follows simple commands  ENT: MMM, no scleral icterus  Neck: Supple, No JVD, no thyroidomegaly  Lungs: Clear to auscultation bilaterally, no wheezes, no rales, no rhonchi, good inspiratory effort  Cardio: RRR, S1/S2, No murmurs  Abdomen: Soft, Nontender, Nondistended; Bowel sounds present  Extremities: No calf tenderness, No pitting edema, no skin changes    LABS:                        10.0   10.69 )-----------( 239      ( 05 Jan 2023 08:34 )             31.6       01-05    140  |  104  |  8   ----------------------------<  83  4.3   |  31  |  0.57    Ca    8.4      05 Jan 2023 08:34    TPro  6.0  /  Alb  1.9  /  TBili  0.4  /  DBili  x   /  AST  52  /  ALT  54  /  AlkPhos  82  01-05 12-29 Chol 157 mg/dL LDL -- HDL 69 mg/dL Trig 66 mg/dL    COVID-19 PCR: NotDetec (01-04-23 @ 16:15)  COVID-19 PCR: NotDetec (01-03-23 @ 10:19)  COVID-19 PCR: NotDetec (01-04-23 @ 16:15)  COVID-19 PCR: NotDetec (01-03-23 @ 10:19)  COVID-19 PCR: NotDetec (03-29-22 @ 22:53)

## 2023-01-07 PROCEDURE — 99232 SBSQ HOSP IP/OBS MODERATE 35: CPT

## 2023-01-07 PROCEDURE — 99231 SBSQ HOSP IP/OBS SF/LOW 25: CPT

## 2023-01-07 RX ORDER — POLYETHYLENE GLYCOL 3350 17 G/17G
17 POWDER, FOR SOLUTION ORAL
Refills: 0 | Status: DISCONTINUED | OUTPATIENT
Start: 2023-01-07 | End: 2023-02-02

## 2023-01-07 RX ADMIN — POLYETHYLENE GLYCOL 3350 17 GRAM(S): 17 POWDER, FOR SOLUTION ORAL at 05:22

## 2023-01-07 RX ADMIN — ENOXAPARIN SODIUM 40 MILLIGRAM(S): 100 INJECTION SUBCUTANEOUS at 18:05

## 2023-01-07 RX ADMIN — ATORVASTATIN CALCIUM 20 MILLIGRAM(S): 80 TABLET, FILM COATED ORAL at 21:59

## 2023-01-07 RX ADMIN — AMLODIPINE BESYLATE 2.5 MILLIGRAM(S): 2.5 TABLET ORAL at 05:22

## 2023-01-07 RX ADMIN — LATANOPROST 1 DROP(S): 0.05 SOLUTION/ DROPS OPHTHALMIC; TOPICAL at 21:58

## 2023-01-07 NOTE — PROGRESS NOTE ADULT - ASSESSMENT
82y female with a history of HTN, HLD, glaucoma who presented to [facility] on 12/28/22 after fall and found to have chronic left SDH s/p L francis hole SDH evacuation.     Admitted to [place] acute inpatient rehab on 1/4/23 with cognitive deficits, and  ADL, gait, and functional impairments.     # Traumatic SDH  - Comprehensive Multidisciplinary Rehab Program: 3 hours a day, 5 days a week with PT/OT/SLP     P&O as needed  -s/p left [place] hole 12/29/22  --Seizure ppx:  brivaracetam to completed 7 days --discontinued 1/5  -Denies headaches  -staples to be removed 1/8/23  -continue to hold ASA per neurosurg    Neuroendocrine w/u-  --Check AM cortisol and TSH and free T4 with routine labs 1/9 AM    #Rhabdomyolysis - improved  -elevated CK (now downtrending) and troponin  -EKG and TTE wnl    #Glaucoma  -c/w home eyedrops    #HTN  -c/w with decreased home dose of amlodipine    #HLD  -c/w atorvastatin      # Sleep:  - Maintain low stim environment    # Pain Management:  - Tylenol PRN    # GI/Bowel:  - Senna QHS, Miralax daily     # /Bladder:   --dc'd PVR  - Encourage timed voids every 4 hours while awake       # Skin/Pressure Injury:  - Skin assessment on admission:  right lateral hip abrasion 5cm x 2cm likely secondary to fall at home-- apply wound gel, cavilon to periwound and cover with allvyn dressing.   - Monitor Incisions: left surgical clean incision intact with staples (to be removed 1/8)    # Diet:   - Diet Consistency/Modifications: Regular diet  - Aspiration Precautions  - c/w  SLP treatment    # DVT ppx:  - lovenox  - Last Doppler on 12/29 negative for DVT    # Restrictions/Precautions:  - Precautions: falls, seizures, aspiration

## 2023-01-07 NOTE — PROGRESS NOTE ADULT - SUBJECTIVE AND OBJECTIVE BOX
Patient is a 82y old  Female who presents with a chief complaint of Traumatic subdural hemorrhage without loss of consciousness, initial encounter     (2023 13:27)      HPI:  82y female with a history of HTN, HLD, glaucoma who presented to [facility] on 22 after fall and found to have CTH with largely chronic L SDH (approx 2cm) w/ 8mm MLS.  Exam on admission was AOx2, PERRL, EOMI, no facial, slight R drift, LOWERY at least 4+/5.  On 22 patient underwent L francis hole for SDH evacuation. She was started on brivaracetam to complete a 7 day course (last dose to be 1/5 pm). Asprin continued to be held in the setting of SDH.  Hospital course notably for elevated CK and troponin  Cardiology consulted and work-up including EKG and TTE wnl.  CK downtrended throughout course and elevated CK and troponin likely secondary to acute rhabdomyolysis secondary to fall.  BP was monitored and amlodipine decreased to 2.5 mg (was 5 mg) for soft BP. Patient was evaluated by Physical Therapy and was recommended for Acute rehab to improve cognitive and physical function.   (2023 14:18)      SUBJECTIVE HISTORY:  Patient seen and evaluated at bedside.  She reports sleeping well and denies pain SOB  She is without complaints.    REVIEW OF SYSTEMS:  denies HA, abdominal pain, n/v    PHYSICAL EXAM  Constitutional - NAD, Comfortable  HEENT -left cranial surgical incision with staples--no drainage  Chest - good chest expansion, good respiratory effort, CTAB   Cardio - warm and well perfused, RRR, no murmur  Abdomen -  Soft, NTND  Extremities - No peripheral edema, No calf tenderness   Neurologic Exam:                    Cognitive -             Orientation: Awake, Alert, AAO to self, place, year            Attention:  Impaired-- delayed processing; difficult reading comprehension     Speech - Fluent, Comprehensible, No dysarthria, No aphasia, repetition intact     Motor -                      LEFT    UE - ShAB 5/5, EF 5/5, EE 5/5, WE 5/5,  WNL                    RIGHT UE - ShAB 5/5, EF 5/5, EE 5/5, WE 5/5,  WNL                    LEFT    LE - HF 3/5, KE 4/5, DF 5/5, PF 5/5                    RIGHT LE - HF 5/5, KE 5/5, DF 5/5, PF 5/5        Coordination -HTS intact; mild dysmetria on FTN     OculoVestibular -  No nystagmus  Psychiatric - Mood stable, Affect WNL  Skin on admission: left surgical clean incision intact with staples; right lateral hip abrasion 5cm x 2cm likely secondary to fall at home    VITALS  82y    T(C): 36.6 (2023 08:40), Max: 36.6 (2023 20:12)  T(F): 97.9 (2023 08:40), Max: 97.9 (2023 20:12)  HR: 65 (2023 08:40) (65 - 77)  BP: 110/64 (2023 08:40) (110/64 - 121/67)  RR: 15 (2023 08:40) (15 - 16)  SpO2: 97% (2023 08:40) (96% - 97%)    Daily Weight in k.9 (2023 22:28)  RECENT LABS:                    10.0   10.69 )-----------( 239      ( 2023 08:34 )             31.6     01-05    140  |  104  |  8   ----------------------------<  83  4.3   |  31  |  0.57    Ca    8.4      2023 08:34    TPro  6.0  /  Alb  1.9<L>  /  TBili  0.4  /  DBili  x   /  AST  52<H>  /  ALT  54<H>  /  AlkPhos  82  01-05    LIVER FUNCTIONS - ( 2023 08:34 )  Alb: 1.9 g/dL / Pro: 6.0 g/dL / ALK PHOS: 82 U/L / ALT: 54 U/L / AST: 52 U/L / GGT: x           MEDICATIONS  (STANDING):  amLODIPine   Tablet 2.5 milliGRAM(s) Oral daily  atorvastatin 20 milliGRAM(s) Oral at bedtime  enoxaparin Injectable 40 milliGRAM(s) SubCutaneous <User Schedule>  latanoprost 0.005% Ophthalmic Solution 1 Drop(s) Both EYES at bedtime  polyethylene glycol 3350 17 Gram(s) Oral two times a day  senna 2 Tablet(s) Oral at bedtime    MEDICATIONS  (PRN):  acetaminophen     Tablet .. 650 milliGRAM(s) Oral every 6 hours PRN Temp greater or equal to 38C (100.4F), Mild Pain (1 - 3)

## 2023-01-07 NOTE — PROGRESS NOTE ADULT - SUBJECTIVE AND OBJECTIVE BOX
Patient is a 82y old  Female who presents with a chief complaint of Traumatic SDH    No events overnight  Denies chest pain, SOB    Patient seen and examined at bedside.    ALLERGIES:  hydrochlorothiazide (Unknown)    MEDICATIONS  (STANDING):  amLODIPine   Tablet 2.5 milliGRAM(s) Oral daily  atorvastatin 20 milliGRAM(s) Oral at bedtime  enoxaparin Injectable 40 milliGRAM(s) SubCutaneous <User Schedule>  latanoprost 0.005% Ophthalmic Solution 1 Drop(s) Both EYES at bedtime  polyethylene glycol 3350 17 Gram(s) Oral two times a day  senna 2 Tablet(s) Oral at bedtime    MEDICATIONS  (PRN):  acetaminophen     Tablet .. 650 milliGRAM(s) Oral every 6 hours PRN Temp greater or equal to 38C (100.4F), Mild Pain (1 - 3)    Vital Signs Last 24 Hrs  T(C): 36.6 (07 Jan 2023 08:40), Max: 36.6 (06 Jan 2023 20:12)  T(F): 97.9 (07 Jan 2023 08:40), Max: 97.9 (06 Jan 2023 20:12)  HR: 65 (07 Jan 2023 08:40) (65 - 77)  BP: 110/64 (07 Jan 2023 08:40) (110/64 - 121/67)  RR: 15 (07 Jan 2023 08:40) (15 - 16)  SpO2: 97% (07 Jan 2023 08:40) (96% - 97%)    Parameters below as of 07 Jan 2023 08:40  Patient On (Oxygen Delivery Method): room air    PHYSICAL EXAM:  General: NAD, A/O, speaking in full sentences, follows simple commands  ENT: MMM, no scleral icterus  Neck: Supple, No JVD, no thyroidomegaly  Lungs: Clear to auscultation bilaterally, no wheezes, no rales, no rhonchi, good inspiratory effort  Cardio: RRR, S1/S2, No murmurs  Abdomen: Soft, Nontender, Nondistended; Bowel sounds present  Extremities: No calf tenderness, No pitting edema, no skin changes    LABS:                        10.0   10.69 )-----------( 239      ( 05 Jan 2023 08:34 )             31.6       01-05    140  |  104  |  8   ----------------------------<  83  4.3   |  31  |  0.57    Ca    8.4      05 Jan 2023 08:34    TPro  6.0  /  Alb  1.9  /  TBili  0.4  /  DBili  x   /  AST  52  /  ALT  54  /  AlkPhos  82  01-05 12-29 Chol 157 mg/dL LDL -- HDL 69 mg/dL Trig 66 mg/dL    COVID-19 PCR: NotDetec (01-04-23 @ 16:15)  COVID-19 PCR: NotDetec (01-03-23 @ 10:19)  COVID-19 PCR: NotDetec (01-04-23 @ 16:15)  COVID-19 PCR: NotDetec (01-03-23 @ 10:19)  COVID-19 PCR: NotDetec (03-29-22 @ 22:53)

## 2023-01-08 PROCEDURE — 99232 SBSQ HOSP IP/OBS MODERATE 35: CPT

## 2023-01-08 PROCEDURE — 99231 SBSQ HOSP IP/OBS SF/LOW 25: CPT

## 2023-01-08 RX ADMIN — LATANOPROST 1 DROP(S): 0.05 SOLUTION/ DROPS OPHTHALMIC; TOPICAL at 21:02

## 2023-01-08 RX ADMIN — ENOXAPARIN SODIUM 40 MILLIGRAM(S): 100 INJECTION SUBCUTANEOUS at 18:01

## 2023-01-08 RX ADMIN — ATORVASTATIN CALCIUM 20 MILLIGRAM(S): 80 TABLET, FILM COATED ORAL at 21:02

## 2023-01-08 RX ADMIN — AMLODIPINE BESYLATE 2.5 MILLIGRAM(S): 2.5 TABLET ORAL at 05:04

## 2023-01-08 NOTE — PROGRESS NOTE ADULT - ASSESSMENT
82y female with a history of HTN, HLD, glaucoma who presented to [facility] on 12/28/22 after fall and found to have chronic left SDH s/p L francis hole SDH evacuation.     Admitted to [place] acute inpatient rehab on 1/4/23 with cognitive deficits, and  ADL, gait, and functional impairments.     # Traumatic SDH  - Comprehensive Multidisciplinary Rehab Program: 3 hours a day, 5 days a week with PT/OT/SLP     P&O as needed  -s/p left [place] hole 12/29/22  --Seizure ppx:  brivaracetam to completed 7 days --discontinued 1/5  -Denies headaches  -will have resident  to remove staples to left scalp  -continue to hold ASA per neurosurg    Neuroendocrine w/u-  --Check AM cortisol and TSH and free T4 with routine labs 1/9 AM    #Rhabdomyolysis - improved  -elevated CK (now downtrending) and troponin  -EKG and TTE wnl    #Glaucoma  -c/w home eyedrops    #HTN  -c/w with decreased home dose of amlodipine    #HLD  -c/w atorvastatin      # Sleep:  - Maintain low stim environment    # Pain Management:  - Tylenol PRN    # GI/Bowel:  - Senna QHS, Miralax daily     # /Bladder:   --dc'd PVR  - Encourage timed voids every 4 hours while awake       # Skin/Pressure Injury:  - Skin assessment on admission:  right lateral hip abrasion 5cm x 2cm likely secondary to fall at home-- apply wound gel, cavilon to periwound and cover with allvyn dressing.   - Monitor Incisions: left surgical clean incision intact with staples (to be removed 1/8)    # Diet:   - Diet Consistency/Modifications: Regular diet  - Aspiration Precautions  - c/w  SLP treatment    # DVT ppx:  - lovenox  - Last Doppler on 12/29 negative for DVT    # Restrictions/Precautions:  - Precautions: falls, seizures, aspiration

## 2023-01-08 NOTE — PROGRESS NOTE ADULT - SUBJECTIVE AND OBJECTIVE BOX
Patient is a 82y old  Female who presents with a chief complaint of Traumatic SDH    No events overnight  Denies chest pain, SOB    Patient seen and examined at bedside.    ALLERGIES:  hydrochlorothiazide (Unknown)    MEDICATIONS  (STANDING):  amLODIPine   Tablet 2.5 milliGRAM(s) Oral daily  atorvastatin 20 milliGRAM(s) Oral at bedtime  enoxaparin Injectable 40 milliGRAM(s) SubCutaneous <User Schedule>  latanoprost 0.005% Ophthalmic Solution 1 Drop(s) Both EYES at bedtime  polyethylene glycol 3350 17 Gram(s) Oral two times a day  senna 2 Tablet(s) Oral at bedtime    MEDICATIONS  (PRN):  acetaminophen     Tablet .. 650 milliGRAM(s) Oral every 6 hours PRN Temp greater or equal to 38C (100.4F), Mild Pain (1 - 3)    Vital Signs Last 24 Hrs  T(C): 36.7 (08 Jan 2023 08:49), Max: 36.7 (08 Jan 2023 08:49)  T(F): 98.1 (08 Jan 2023 08:49), Max: 98.1 (08 Jan 2023 08:49)  HR: 63 (08 Jan 2023 08:49) (63 - 72)  BP: 140/75 (08 Jan 2023 08:49) (130/62 - 140/78)  RR: 15 (08 Jan 2023 08:49) (15 - 16)  SpO2: 100% (08 Jan 2023 08:49) (96% - 100%)    Parameters below as of 08 Jan 2023 08:49  Patient On (Oxygen Delivery Method): room air    PHYSICAL EXAM:  General: NAD, A/O, speaking in full sentences, follows simple commands  ENT: MMM, no scleral icterus  Neck: Supple, No JVD, no thyroidomegaly  Lungs: Clear to auscultation bilaterally, no wheezes, no rales, no rhonchi, good inspiratory effort  Cardio: RRR, S1/S2, No murmurs  Abdomen: Soft, Nontender, Nondistended; Bowel sounds present  Extremities: No calf tenderness, No pitting edema, no skin changes    LABS:                        10.0   10.69 )-----------( 239      ( 05 Jan 2023 08:34 )             31.6       01-05    140  |  104  |  8   ----------------------------<  83  4.3   |  31  |  0.57    Ca    8.4      05 Jan 2023 08:34    TPro  6.0  /  Alb  1.9  /  TBili  0.4  /  DBili  x   /  AST  52  /  ALT  54  /  AlkPhos  82  01-05 12-29 Chol 157 mg/dL LDL -- HDL 69 mg/dL Trig 66 mg/dL    COVID-19 PCR: NotDetec (01-04-23 @ 16:15)  COVID-19 PCR: NotDetec (01-03-23 @ 10:19)  COVID-19 PCR: NotDetec (01-04-23 @ 16:15)  COVID-19 PCR: NotDetec (01-03-23 @ 10:19)  COVID-19 PCR: NotDetec (03-29-22 @ 22:53)

## 2023-01-08 NOTE — PROGRESS NOTE ADULT - SUBJECTIVE AND OBJECTIVE BOX
Patient is a 82y old  Female who presents with a chief complaint of Traumatic subdural hemorrhage without loss of consciousness, initial encounter     (05 Jan 2023 13:27)      HPI:  82y female with a history of HTN, HLD, glaucoma who presented to [facility] on 12/28/22 after fall and found to have CTH with largely chronic L SDH (approx 2cm) w/ 8mm MLS.  Exam on admission was AOx2, PERRL, EOMI, no facial, slight R drift, LOWERY at least 4+/5.  On 12/29/22 patient underwent L francis hole for SDH evacuation. She was started on brivaracetam to complete a 7 day course (last dose to be 1/5 pm). Asprin continued to be held in the setting of SDH.  Hospital course notably for elevated CK and troponin  Cardiology consulted and work-up including EKG and TTE wnl.  CK downtrended throughout course and elevated CK and troponin likely secondary to acute rhabdomyolysis secondary to fall.  BP was monitored and amlodipine decreased to 2.5 mg (was 5 mg) for soft BP. Patient was evaluated by Physical Therapy and was recommended for Acute rehab to improve cognitive and physical function.   (04 Jan 2023 14:18)      SUBJECTIVE HISTORY:  Patient seen and evaluated at bedside.  She reports sleeping well and denies pain SOB  She is without complaints.    REVIEW OF SYSTEMS:  denies HA, abdominal pain, n/v    PHYSICAL EXAM  Constitutional - NAD, Comfortable  HEENT -left cranial surgical incision with staples--no drainage  Chest - good chest expansion, good respiratory effort, CTAB   Cardio - warm and well perfused, RRR, no murmur  Abdomen -  Soft, NTND  Extremities - No peripheral edema, No calf tenderness   Neurologic Exam:                    Cognitive -             Orientation: Awake, Alert, AAO to self, place, year            Attention:  Impaired-- delayed processing; difficult reading comprehension     Speech - Fluent, Comprehensible, No dysarthria, No aphasia, repetition intact     Motor -                      LEFT    UE - ShAB 5/5, EF 5/5, EE 5/5, WE 5/5,  WNL                    RIGHT UE - ShAB 5/5, EF 5/5, EE 5/5, WE 5/5,  WNL                    LEFT    LE - HF 3/5, KE 4/5, DF 5/5, PF 5/5                    RIGHT LE - HF 5/5, KE 5/5, DF 5/5, PF 5/5        Coordination -HTS intact; mild dysmetria on FTN     OculoVestibular -  No nystagmus  Psychiatric - Mood stable, Affect WNL  Skin on admission: left surgical clean incision intact with staples; right lateral hip abrasion 5cm x 2cm likely secondary to fall at home    VITALS  82y    ICU Vital Signs Last 24 Hrs  T(C): 36.7 (08 Jan 2023 08:49), Max: 36.7 (08 Jan 2023 08:49)  T(F): 98.1 (08 Jan 2023 08:49), Max: 98.1 (08 Jan 2023 08:49)  HR: 63 (08 Jan 2023 08:49) (63 - 72)  BP: 140/75 (08 Jan 2023 08:49) (130/62 - 140/78)  RR: 15 (08 Jan 2023 08:49) (15 - 16)  SpO2: 100% (08 Jan 2023 08:49) (96% - 100%)      RECENT LABS:                    10.0   10.69 )-----------( 239      ( 05 Jan 2023 08:34 )             31.6     01-05    140  |  104  |  8   ----------------------------<  83  4.3   |  31  |  0.57    Ca    8.4      05 Jan 2023 08:34    TPro  6.0  /  Alb  1.9<L>  /  TBili  0.4  /  DBili  x   /  AST  52<H>  /  ALT  54<H>  /  AlkPhos  82  01-05    LIVER FUNCTIONS - ( 05 Jan 2023 08:34 )  Alb: 1.9 g/dL / Pro: 6.0 g/dL / ALK PHOS: 82 U/L / ALT: 54 U/L / AST: 52 U/L / GGT: x           MEDICATIONS  (STANDING):  amLODIPine   Tablet 2.5 milliGRAM(s) Oral daily  atorvastatin 20 milliGRAM(s) Oral at bedtime  enoxaparin Injectable 40 milliGRAM(s) SubCutaneous <User Schedule>  latanoprost 0.005% Ophthalmic Solution 1 Drop(s) Both EYES at bedtime  polyethylene glycol 3350 17 Gram(s) Oral two times a day  senna 2 Tablet(s) Oral at bedtime    MEDICATIONS  (PRN):  acetaminophen     Tablet .. 650 milliGRAM(s) Oral every 6 hours PRN Temp greater or equal to 38C (100.4F), Mild Pain (1 - 3)

## 2023-01-08 NOTE — PROGRESS NOTE ADULT - ASSESSMENT
82y female with a history of HTN, HLD, glaucoma who presented to [facility] on 12/28/22 after fall and found to have chronic left SDH s/p L francis hole SDH evacuation.     # Traumatic SDH  -s/p left Francis hole 12/29/22  -s/p brivaracetam  -staples to be removed 1/8/23  -continue to hold ASA per neurosurg    #Normocytic anemia  - Will monitor Hg/Hct, transfuse if Hg <7  - Iron/B12/folate pending    #Rhabdomyolysis - improved  -elevated CK (now downtrending) and troponin  -EKG and TTE wnl    #Glaucoma  -c/w home eyedrops    #HTN  -c/w amlodipine 2.5mg daily    #HLD  -c/w atorvastatin    # DVT ppx:  lovenox

## 2023-01-09 LAB
ALBUMIN SERPL ELPH-MCNC: 2.1 G/DL — LOW (ref 3.3–5)
ALP SERPL-CCNC: 97 U/L — SIGNIFICANT CHANGE UP (ref 40–120)
ALT FLD-CCNC: 64 U/L — HIGH (ref 10–45)
ANION GAP SERPL CALC-SCNC: 7 MMOL/L — SIGNIFICANT CHANGE UP (ref 5–17)
AST SERPL-CCNC: 64 U/L — HIGH (ref 10–40)
BILIRUB SERPL-MCNC: 0.2 MG/DL — SIGNIFICANT CHANGE UP (ref 0.2–1.2)
BUN SERPL-MCNC: 12 MG/DL — SIGNIFICANT CHANGE UP (ref 7–23)
CALCIUM SERPL-MCNC: 8.5 MG/DL — SIGNIFICANT CHANGE UP (ref 8.4–10.5)
CHLORIDE SERPL-SCNC: 109 MMOL/L — HIGH (ref 96–108)
CK SERPL-CCNC: 79 U/L — SIGNIFICANT CHANGE UP (ref 25–170)
CO2 SERPL-SCNC: 30 MMOL/L — SIGNIFICANT CHANGE UP (ref 22–31)
CORTIS AM PEAK SERPL-MCNC: 14.9 UG/DL — SIGNIFICANT CHANGE UP (ref 6–18.4)
CREAT SERPL-MCNC: 0.61 MG/DL — SIGNIFICANT CHANGE UP (ref 0.5–1.3)
EGFR: 89 ML/MIN/1.73M2 — SIGNIFICANT CHANGE UP
FOLATE SERPL-MCNC: 13.1 NG/ML — SIGNIFICANT CHANGE UP
GLUCOSE SERPL-MCNC: 84 MG/DL — SIGNIFICANT CHANGE UP (ref 70–99)
HCT VFR BLD CALC: 33.1 % — LOW (ref 34.5–45)
HGB BLD-MCNC: 10.3 G/DL — LOW (ref 11.5–15.5)
IRON SATN MFR SERPL: 15 % — SIGNIFICANT CHANGE UP (ref 14–50)
IRON SATN MFR SERPL: 33 UG/DL — SIGNIFICANT CHANGE UP (ref 30–160)
MCHC RBC-ENTMCNC: 26.6 PG — LOW (ref 27–34)
MCHC RBC-ENTMCNC: 31.1 GM/DL — LOW (ref 32–36)
MCV RBC AUTO: 85.5 FL — SIGNIFICANT CHANGE UP (ref 80–100)
NRBC # BLD: 0 /100 WBCS — SIGNIFICANT CHANGE UP (ref 0–0)
PLATELET # BLD AUTO: 365 K/UL — SIGNIFICANT CHANGE UP (ref 150–400)
POTASSIUM SERPL-MCNC: 3.8 MMOL/L — SIGNIFICANT CHANGE UP (ref 3.5–5.3)
POTASSIUM SERPL-SCNC: 3.8 MMOL/L — SIGNIFICANT CHANGE UP (ref 3.5–5.3)
PROT SERPL-MCNC: 6.2 G/DL — SIGNIFICANT CHANGE UP (ref 6–8.3)
RBC # BLD: 3.87 M/UL — SIGNIFICANT CHANGE UP (ref 3.8–5.2)
RBC # FLD: 14.5 % — SIGNIFICANT CHANGE UP (ref 10.3–14.5)
SODIUM SERPL-SCNC: 146 MMOL/L — HIGH (ref 135–145)
T4 FREE SERPL-MCNC: 1.5 NG/DL — SIGNIFICANT CHANGE UP (ref 0.9–1.8)
TIBC SERPL-MCNC: 211 UG/DL — LOW (ref 220–430)
TSH SERPL-MCNC: 1.85 UIU/ML — SIGNIFICANT CHANGE UP (ref 0.36–3.74)
UIBC SERPL-MCNC: 179 UG/DL — SIGNIFICANT CHANGE UP (ref 110–370)
VIT B12 SERPL-MCNC: 994 PG/ML — SIGNIFICANT CHANGE UP (ref 232–1245)
WBC # BLD: 8.07 K/UL — SIGNIFICANT CHANGE UP (ref 3.8–10.5)
WBC # FLD AUTO: 8.07 K/UL — SIGNIFICANT CHANGE UP (ref 3.8–10.5)

## 2023-01-09 PROCEDURE — 99232 SBSQ HOSP IP/OBS MODERATE 35: CPT

## 2023-01-09 RX ADMIN — ENOXAPARIN SODIUM 40 MILLIGRAM(S): 100 INJECTION SUBCUTANEOUS at 17:55

## 2023-01-09 RX ADMIN — AMLODIPINE BESYLATE 2.5 MILLIGRAM(S): 2.5 TABLET ORAL at 05:39

## 2023-01-09 RX ADMIN — LATANOPROST 1 DROP(S): 0.05 SOLUTION/ DROPS OPHTHALMIC; TOPICAL at 21:56

## 2023-01-09 RX ADMIN — ATORVASTATIN CALCIUM 20 MILLIGRAM(S): 80 TABLET, FILM COATED ORAL at 21:56

## 2023-01-09 NOTE — PROGRESS NOTE ADULT - ATTENDING COMMENTS
I have personally seen and examined the patient with the resident.. Medical records reviewed. I have made amendments to the documentation where necessary and adjusted the history, physical examination, and plan as documented by the resident.    No headaches, no seizures, stable exam with expressive aphasia and moderate to severe cognitive deficits, mild right sided weakness ( decreased FMC, pronator drift) and gait impairment. All deficits are improving. Surveillance Head CT.

## 2023-01-09 NOTE — PROGRESS NOTE ADULT - ASSESSMENT
82y female with a history of HTN, HLD, glaucoma who presented to [facility] on 12/28/22 after fall and found to have chronic left SDH s/p L francis hole SDH evacuation.     Admitted to [place] acute inpatient rehab on 1/4/23 with cognitive deficits, and  ADL, gait, and functional impairments.     # Traumatic SDH  - Comprehensive Multidisciplinary Rehab Program: 3 hours a day, 5 days a week with PT/OT/SLP     P&O as needed  -s/p left [place] hole 12/29/22  --Seizure ppx:  brivaracetam to completed 7 days --discontinued 1/5  -staples removed 1/8/23  -continue to hold ASA per neurosurg  -FU repeat CTH 1/9 for evaluation of SDH and midline shift    Neuroendocrine w/u-  --AM cortisol, TSH and free T4 wnl    #Rhabdomyolysis - improved  -elevated CK (now downtrending) and troponin  -EKG and TTE wnl  -FU repeat CK lvel    #Glaucoma  -c/w home eyedrops    #HTN  -c/w with decreased home dose of amlodipine    #HLD  -c/w atorvastatin      # Sleep:  - Maintain low stim environment    # Pain Management:  - Tylenol PRN    # GI/Bowel:  - Senna QHS, Miralax daily     # /Bladder:   - Encourage timed voids every 4 hours while awake       # Skin/Pressure Injury:  - Skin assessment on admission:  right lateral hip abrasion 5cm x 2cm likely secondary to fall at home-- apply wound gel, cavilon to periwound and cover with allvyn dressing.   - Monitor Incisions: left surgical clean incision intact staples removed 1/8    # Diet:   - Diet Consistency/Modifications: Regular diet  - Aspiration Precautions  - c/w  SLP treatment    # DVT ppx:  - lovenox  - Last Doppler on 12/29 negative for DVT    # Restrictions/Precautions:  - Precautions: falls, seizures, aspiration

## 2023-01-09 NOTE — PROGRESS NOTE ADULT - SUBJECTIVE AND OBJECTIVE BOX
Patient is a 82y old  Female who presents with a chief complaint of Traumatic SDH (09 Jan 2023 14:13)      HPI:  82y female with a history of HTN, HLD, glaucoma who presented to [facility] on 12/28/22 after fall and found to have CTH with largely chronic L SDH (approx 2cm) w/ 8mm MLS.  Exam on admission was AOx2, PERRL, EOMI, no facial, slight R drift, LOWERY at least 4+/5.  On 12/29/22 patient underwent L francis hole for SDH evacuation. She was started on brivaracetam to complete a 7 day course (last dose to be 1/5 pm). Asprin continued to be held in the setting of SDH.  Hospital course notably for elevated CK and troponin  Cardiology consulted and work-up including EKG and TTE wnl.  CK downtrended throughout course and elevated CK and troponin likely secondary to acute rhabdomyolysis secondary to fall.  BP was monitored and amlodipine decreased to 2.5 mg (was 5 mg) for soft BP. Patient was evaluated by Physical Therapy and was recommended for Acute rehab to improve cognitive and physical function.   (04 Jan 2023 14:18)      SUBJECTIVE HISTORY:  Patient seen and evaluated on AM rounds.  Patient reports some difficulty sleeping but does not want to trial melatonin at this time.  She denies headaches.    REVIEW OF SYSTEM:  denies HA, abdominal pain, n/v, constipation        PHYSICAL EXAM  Constitutional - NAD, Comfortable  HEENT -left cranial surgical incision staples removed 1/8--no drainage  Chest - good chest expansion, good respiratory effort, CTAB   Cardio - warm and well perfused, RRR, no murmur  Abdomen -  Soft, NTND  Extremities - No peripheral edema, No calf tenderness   Neurologic Exam:                    Cognitive -             Orientation: Awake, Alert, AAO to self, place, year            Attention:  Impaired-- delayed processing     Speech - + word finding difficulties; Fluent, Comprehensible, No dysarthria, No aphasia, repetition intact     Motor -                      LEFT    UE - ShAB 5/5, EF 5/5, EE 5/5, WE 5/5,  WNL                    RIGHT UE - ShAB 5/5, EF 5/5, EE 5/5, WE 5/5,  WNL                    LEFT    LE - HF 3/5, KE 4/5, DF 5/5, PF 5/5                    RIGHT LE - HF 5/5, KE 5/5, DF 5/5, PF 5/5        Coordination -HTS intact; mild dysmetria on FTN; mild right sided pronator drift     OculoVestibular -  No nystagmus  Psychiatric - Mood stable, Affect WNL  Skin on admission: left surgical clean incision intact staples removed 1/8; right lateral hip abrasion 5cm x 2cm likely secondary to fall at home        VITALS  82y  Vital Signs Last 24 Hrs  T(C): 36.8 (09 Jan 2023 07:18), Max: 36.8 (09 Jan 2023 07:18)  T(F): 98.3 (09 Jan 2023 07:18), Max: 98.3 (09 Jan 2023 07:18)  HR: 66 (09 Jan 2023 07:18) (64 - 74)  BP: 130/68 (09 Jan 2023 07:18) (111/60 - 130/68)  BP(mean): --  RR: 16 (09 Jan 2023 07:18) (14 - 16)  SpO2: 98% (09 Jan 2023 07:18) (98% - 100%)    Parameters below as of 09 Jan 2023 07:18  Patient On (Oxygen Delivery Method): room air      Daily     Daily         RECENT LABS:                          10.3   8.07  )-----------( 365      ( 09 Jan 2023 06:27 )             33.1     01-09    146<H>  |  109<H>  |  12  ----------------------------<  84  3.8   |  30  |  0.61    Ca    8.5      09 Jan 2023 06:27    TPro  6.2  /  Alb  2.1<L>  /  TBili  0.2  /  DBili  x   /  AST  64<H>  /  ALT  64<H>  /  AlkPhos  97  01-09    LIVER FUNCTIONS - ( 09 Jan 2023 06:27 )  Alb: 2.1 g/dL / Pro: 6.2 g/dL / ALK PHOS: 97 U/L / ALT: 64 U/L / AST: 64 U/L / GGT: x                   CAPILLARY BLOOD GLUCOSE          MEDICATIONS  (STANDING):  amLODIPine   Tablet 2.5 milliGRAM(s) Oral daily  atorvastatin 20 milliGRAM(s) Oral at bedtime  enoxaparin Injectable 40 milliGRAM(s) SubCutaneous <User Schedule>  latanoprost 0.005% Ophthalmic Solution 1 Drop(s) Both EYES at bedtime    MEDICATIONS  (PRN):  acetaminophen     Tablet .. 650 milliGRAM(s) Oral every 6 hours PRN Temp greater or equal to 38C (100.4F), Mild Pain (1 - 3)  polyethylene glycol 3350 17 Gram(s) Oral two times a day PRN Constipation

## 2023-01-09 NOTE — PROGRESS NOTE ADULT - ASSESSMENT
82y female with a history of HTN, HLD, glaucoma who presented to [facility] on 12/28/22 after fall and found to have chronic left SDH s/p L francis hole SDH evacuation.     # Traumatic SDH  -s/p left Francis hole 12/29/22  -s/p brivaracetam  -staples to be removed 1/8/23  -continue to hold ASA per neurosurg    #Normocytic anemia  - Will monitor Hg/Hct, transfuse if Hg <7  - Iron/B12/folate pending    #Rhabdomyolysis - improved  -elevated CK (now downtrending) and troponin  -EKG and TTE wnl    #Glaucoma  -c/w home eyedrops    #HTN  -c/w amlodipine 2.5mg daily    #HLD  -c/w atorvastatin    # DVT ppx:  lovenox

## 2023-01-09 NOTE — PROGRESS NOTE ADULT - SUBJECTIVE AND OBJECTIVE BOX
Patient is a 82y old  Female who presents with a chief complaint of Traumatic SDH (08 Jan 2023 10:35)    No events overnight  Denies chest pain, SOB  Patient seen and examined at bedside.    ALLERGIES:  hydrochlorothiazide (Unknown)    MEDICATIONS  (STANDING):  amLODIPine   Tablet 2.5 milliGRAM(s) Oral daily  atorvastatin 20 milliGRAM(s) Oral at bedtime  enoxaparin Injectable 40 milliGRAM(s) SubCutaneous <User Schedule>  latanoprost 0.005% Ophthalmic Solution 1 Drop(s) Both EYES at bedtime    MEDICATIONS  (PRN):  acetaminophen     Tablet .. 650 milliGRAM(s) Oral every 6 hours PRN Temp greater or equal to 38C (100.4F), Mild Pain (1 - 3)  polyethylene glycol 3350 17 Gram(s) Oral two times a day PRN Constipation    Vital Signs Last 24 Hrs  T(F): 98.3 (09 Jan 2023 07:18), Max: 98.3 (09 Jan 2023 07:18)  HR: 66 (09 Jan 2023 07:18) (64 - 74)  BP: 130/68 (09 Jan 2023 07:18) (111/60 - 130/68)  RR: 16 (09 Jan 2023 07:18) (14 - 16)  SpO2: 98% (09 Jan 2023 07:18) (98% - 100%)  I&O's Summary    08 Jan 2023 07:01  -  09 Jan 2023 07:00  --------------------------------------------------------  IN: 0 mL / OUT: 2 mL / NET: -2 mL      BMI (kg/m2): 23.8 (01-04-23 @ 14:22)  PHYSICAL EXAM:  General: NAD, A/O, speaking in full sentences, follows simple commands  ENT: MMM, no scleral icterus  Neck: Supple, No JVD, no thyroidomegaly  Lungs: Clear to auscultation bilaterally, no wheezes, no rales, no rhonchi, good inspiratory effort  Cardio: RRR, S1/S2, No murmurs  Abdomen: Soft, Nontender, Nondistended; Bowel sounds present  Extremities: No calf tenderness, No pitting edema, no skin changes    LABS:                        10.3   8.07  )-----------( 365      ( 09 Jan 2023 06:27 )             33.1       01-09    146  |  109  |  12  ----------------------------<  84  3.8   |  30  |  0.61    Ca    8.5      09 Jan 2023 06:27    TPro  6.2  /  Alb  2.1  /  TBili  0.2  /  DBili  x   /  AST  64  /  ALT  64  /  AlkPhos  97  01-09 12-29 Chol 157 mg/dL LDL -- HDL 69 mg/dL Trig 66 mg/dL  TSH 1.853   TSH with FT4 reflex --  Total T3 --    COVID-19 PCR: NotDetec (01-04-23 @ 16:15)  COVID-19 PCR: NotDetec (01-03-23 @ 10:19)  COVID-19 PCR: NotDetec (01-04-23 @ 16:15)  COVID-19 PCR: NotDetec (01-03-23 @ 10:19)  COVID-19 PCR: NotDetec (03-29-22 @ 22:53)

## 2023-01-10 PROBLEM — Z00.00 ENCOUNTER FOR PREVENTIVE HEALTH EXAMINATION: Status: ACTIVE | Noted: 2023-01-10

## 2023-01-10 PROCEDURE — 70450 CT HEAD/BRAIN W/O DYE: CPT | Mod: 26

## 2023-01-10 PROCEDURE — 99232 SBSQ HOSP IP/OBS MODERATE 35: CPT

## 2023-01-10 RX ADMIN — AMLODIPINE BESYLATE 2.5 MILLIGRAM(S): 2.5 TABLET ORAL at 05:14

## 2023-01-10 RX ADMIN — LATANOPROST 1 DROP(S): 0.05 SOLUTION/ DROPS OPHTHALMIC; TOPICAL at 21:35

## 2023-01-10 RX ADMIN — ATORVASTATIN CALCIUM 20 MILLIGRAM(S): 80 TABLET, FILM COATED ORAL at 21:35

## 2023-01-10 RX ADMIN — ENOXAPARIN SODIUM 40 MILLIGRAM(S): 100 INJECTION SUBCUTANEOUS at 17:50

## 2023-01-10 NOTE — PROGRESS NOTE ADULT - ATTENDING COMMENTS
I have personally seen and examined the patient with the fellow. Medical records reviewed. I have made amendments to the documentation where necessary and adjusted the history, physical examination, and plan as documented by the fellow.    Improving clinically, functionally and radiologically - Head CT reviewed  Full program

## 2023-01-10 NOTE — PROGRESS NOTE ADULT - ASSESSMENT
82y female with a history of HTN, HLD, glaucoma who presented to [facility] on 12/28/22 after fall and found to have chronic left SDH s/p L francis hole SDH evacuation.     Admitted to [place] acute inpatient rehab on 1/4/23 with cognitive deficits, and  ADL, gait, and functional impairments.     # Traumatic SDH  - Comprehensive Multidisciplinary Rehab Program: 3 hours a day, 5 days a week with PT/OT/SLP     P&O as needed  -s/p left [place] hole 12/29/22  --Seizure ppx:  brivaracetam to completed 7 days --discontinued 1/5  -staples removed 1/8/23  -continue to hold ASA per neurosurg  -Repeat CT head 1/10-  Previously noted left-sided subdural hematoma is again identified. Decrease in size, mass effect and decreased left-to-right shift is seen.    Neuroendocrine w/u-  --AM cortisol, TSH and free T4 wnl    #Rhabdomyolysis - Resolved  -elevated CK (now downtrending) and troponin  -EKG and TTE wnl  -Repeat CK level-- 79, previously 296    #Glaucoma  -c/w home eyedrops    #HTN  -c/w with decreased home dose of amlodipine    #HLD  -c/w atorvastatin    # Sleep:  - Maintain low stim environment    # Pain Management:  - Tylenol PRN    # GI/Bowel:  - Senna QHS, Miralax daily     # /Bladder:   - Encourage timed voids every 4 hours while awake       # Skin/Pressure Injury:  - Skin assessment on admission:  right lateral hip abrasion 5cm x 2cm likely secondary to fall at home-- apply wound gel, cavilon to periwound and cover with allvyn dressing.   - Monitor Incisions: left surgical clean incision intact staples removed 1/8    # Diet:   - Diet Consistency/Modifications: Regular diet  - Aspiration Precautions  - c/w  SLP treatment    # DVT ppx:  - lovenox  - Last Doppler on 12/29 negative for DVT    # Restrictions/Precautions:  - Precautions: falls, seizures, aspiration     82y female with a history of HTN, HLD, glaucoma who presented to [facility] on 12/28/22 after fall and found to have chronic left SDH s/p L francis hole SDH evacuation.     Admitted to [place] acute inpatient rehab on 1/4/23 with cognitive deficits, and  ADL, gait, and functional impairments.     # Traumatic SDH  - Comprehensive Multidisciplinary Rehab Program: 3 hours a day, 5 days a week with PT/OT/SLP     P&O as needed  -s/p left [place] hole 12/29/22  --Seizure ppx:  brivaracetam to completed 7 days --discontinued 1/5  -staples removed 1/8/23  -continue to hold ASA per neurosurg  -Repeat CT head 1/10-  Previously noted left-sided subdural hematoma is again identified. Decrease in size, mass effect and decreased left-to-right shift is seen.    Neuroendocrine w/u-  --AM cortisol, TSH and free T4 wnl    #Rhabdomyolysis - Resolved  -elevated CK (now downtrending) and troponin  -EKG and TTE wnl  -Repeat CK level-- 79, previously 296    #Glaucoma  -c/w home eyedrops    #HTN  -c/w with decreased home dose of amlodipine    #HLD  -c/w atorvastatin    # Sleep:  - Maintain low stim environment    # Pain Management:  - Tylenol PRN    # GI/Bowel:  - Senna QHS, Miralax daily     # /Bladder:   - Encourage timed voids every 4 hours while awake       # Skin/Pressure Injury:  - Skin assessment on admission:  right lateral hip abrasion 5cm x 2cm likely secondary to fall at home-- apply wound gel, cavilon to periwound and cover with allvyn dressing.   - Monitor Incisions: left surgical clean incision intact staples removed 1/8    # Diet:   - Diet Consistency/Modifications: Regular diet  - Aspiration Precautions  - c/w  SLP treatment    # DVT ppx:  - lovenox  - Last Doppler on 12/29 negative for DVT    # Restrictions/Precautions:  - Precautions: falls, seizures, aspiration    IDT 1/10  Social work- Patient resides alone in an apartment with elevator access; independent prior  ST- Mil- moderate language deficits, decreased problem solving skills, confabulations at times, poor awareness, impulsive, needs redirection  OT- Dressing, toileting, transfers- Min A, UBD- Supervision A.  PT- Min A with transfers, ambulates 50 ft with RW min A with WC follow  Goals- ambulate to bathroom with RW with supervision, appropriate call bell use, Improve problem solving and safety awareness   Dispo- 1/20 home vs [facility]

## 2023-01-10 NOTE — PROGRESS NOTE ADULT - SUBJECTIVE AND OBJECTIVE BOX
SUBJECTIVE/OBJECTIVE: Patient seen and examined while sitting in the wheelchair. No acute overnight events, slept well. Endorses memory issues but no other complaints at this time. Patient had repeat CT head this morning, stable.      REVIEW OF SYSTEMS  Constitutional: No fever, No Chills, + fatigue  Pulm: No cough,  No shortness of breath  Cardio: No chest pain, No palpitations  Neuro: + memory loss    VITALS  82y  Vital Signs Last 24 Hrs  T(C): 36.7 (09 Jan 2023 20:15), Max: 36.7 (09 Jan 2023 20:15)  T(F): 98 (09 Jan 2023 20:15), Max: 98 (09 Jan 2023 20:15)  HR: 64 (10 Yaron 2023 05:16) (60 - 64)  BP: 116/66 (10 Yaron 2023 05:16) (116/66 - 117/60)  RR: 14 (10 Yaron 2023 05:16) (14 - 14)  SpO2: 100% (10 Yaron 2023 05:16) (97% - 100%)    Parameters below as of 10 Yaron 2023 05:16  Patient On (Oxygen Delivery Method): room air    PHYSICAL EXAM  Constitutional - NAD, Comfortable  HEENT -left cranial surgical incision; staples removed 1/8--no drainage  Chest - good chest expansion, good respiratory effort, CTAB   Cardio - warm and well perfused, RRR, no murmur  Abdomen -  Soft, NTND  Extremities - No peripheral edema, No calf tenderness   Neurologic Exam:                    Cognitive -             Orientation: Awake, Alert, AAO to self, place, year            Attention:  Impaired-- delayed processing     Speech - + word finding difficulties; Fluent, Comprehensible, No dysarthria, No aphasia, repetition intact     Motor -                      LEFT    UE - ShAB 5/5, EF 5/5, EE 5/5, WE 5/5,  WNL                    RIGHT UE - ShAB 5/5, EF 5/5, EE 5/5, WE 5/5,  WNL                    LEFT    LE - HF 3/5, KE 4/5, DF 5/5, PF 5/5                    RIGHT LE - HF 5/5, KE 5/5, DF 5/5, PF 5/5        Coordination -HTS intact; mild dysmetria on FTN; mild right sided pronator drift     OculoVestibular -  No nystagmus  Psychiatric - Mood stable, Affect WNL  Skin on admission: left surgical clean incision intact staples removed 1/8; right lateral hip abrasion 5cm x 2cm likely secondary to fall at home      RECENT LABS:                        10.3   8.07  )-----------( 365      ( 09 Jan 2023 06:27 )             33.1     01-09    146<H>  |  109<H>  |  12  ----------------------------<  84  3.8   |  30  |  0.61    Ca    8.5      09 Jan 2023 06:27    TPro  6.2  /  Alb  2.1<L>  /  TBili  0.2  /  DBili  x   /  AST  64<H>  /  ALT  64<H>  /  AlkPhos  97  01-09    LIVER FUNCTIONS - ( 09 Jan 2023 06:27 )  Alb: 2.1 g/dL / Pro: 6.2 g/dL / ALK PHOS: 97 U/L / ALT: 64 U/L / AST: 64 U/L / GGT: x           MEDICATIONS:  MEDICATIONS  (STANDING):  amLODIPine   Tablet 2.5 milliGRAM(s) Oral daily  atorvastatin 20 milliGRAM(s) Oral at bedtime  enoxaparin Injectable 40 milliGRAM(s) SubCutaneous <User Schedule>  latanoprost 0.005% Ophthalmic Solution 1 Drop(s) Both EYES at bedtime    MEDICATIONS  (PRN):  acetaminophen     Tablet .. 650 milliGRAM(s) Oral every 6 hours PRN Temp greater or equal to 38C (100.4F), Mild Pain (1 - 3)  polyethylene glycol 3350 17 Gram(s) Oral two times a day PRN Constipation

## 2023-01-11 PROCEDURE — 99232 SBSQ HOSP IP/OBS MODERATE 35: CPT

## 2023-01-11 RX ADMIN — LATANOPROST 1 DROP(S): 0.05 SOLUTION/ DROPS OPHTHALMIC; TOPICAL at 21:03

## 2023-01-11 RX ADMIN — ENOXAPARIN SODIUM 40 MILLIGRAM(S): 100 INJECTION SUBCUTANEOUS at 18:11

## 2023-01-11 RX ADMIN — ATORVASTATIN CALCIUM 20 MILLIGRAM(S): 80 TABLET, FILM COATED ORAL at 21:03

## 2023-01-11 RX ADMIN — AMLODIPINE BESYLATE 2.5 MILLIGRAM(S): 2.5 TABLET ORAL at 05:45

## 2023-01-11 NOTE — PROGRESS NOTE ADULT - SUBJECTIVE AND OBJECTIVE BOX
Patient is a 82y old  Female who presents with a chief complaint of Traumatic SDH (10 Yaron 2023 10:52)    No events overnight  Denies chest pain, SOB  Tolerating diet  Patient seen and examined at bedside.    ALLERGIES:  hydrochlorothiazide (Unknown)    MEDICATIONS  (STANDING):  amLODIPine   Tablet 2.5 milliGRAM(s) Oral daily  atorvastatin 20 milliGRAM(s) Oral at bedtime  enoxaparin Injectable 40 milliGRAM(s) SubCutaneous <User Schedule>  latanoprost 0.005% Ophthalmic Solution 1 Drop(s) Both EYES at bedtime    MEDICATIONS  (PRN):  acetaminophen     Tablet .. 650 milliGRAM(s) Oral every 6 hours PRN Temp greater or equal to 38C (100.4F), Mild Pain (1 - 3)  polyethylene glycol 3350 17 Gram(s) Oral two times a day PRN Constipation    Vital Signs Last 24 Hrs  T(F): 97.9 (11 Jan 2023 07:57), Max: 98.4 (10 Yaron 2023 19:58)  HR: 63 (11 Jan 2023 07:57) (63 - 79)  BP: 152/77 (11 Jan 2023 07:57) (112/64 - 152/77)  RR: 16 (11 Jan 2023 07:57) (14 - 16)  SpO2: 98% (11 Jan 2023 07:57) (98% - 100%)  I&O's Summary    PHYSICAL EXAM:  General: NAD, A/Ox3, speaking in full sentences, follows simple commands, sitting up in chair  ENT: MMM, no scleral icterus  Neck: Supple, No JVD, no thyroidomegaly  Lungs: Clear to auscultation bilaterally, no wheezes, no rales, no rhonchi, good inspiratory effort  Cardio: RRR, S1/S2, No murmurs  Abdomen: Soft, Nontender, Nondistended; Bowel sounds present  Extremities: No calf tenderness, No pitting edema, no skin changes      LABS:                        10.3   8.07  )-----------( 365      ( 09 Jan 2023 06:27 )             33.1       01-09    146  |  109  |  12  ----------------------------<  84  3.8   |  30  |  0.61    Ca    8.5      09 Jan 2023 06:27    TPro  6.2  /  Alb  2.1  /  TBili  0.2  /  DBili  x   /  AST  64  /  ALT  64  /  AlkPhos  97  01-09     CARDIAC MARKERS ( 09 Jan 2023 06:27 )  x     / x     / 79 U/L / x     / x        12-29 Chol 157 mg/dL LDL -- HDL 69 mg/dL Trig 66 mg/dL  TSH 1.853   TSH with FT4 reflex --  Total T3 --    COVID-19 PCR: NotDetec (01-04-23 @ 16:15)  COVID-19 PCR: NotDetec (01-03-23 @ 10:19)  COVID-19 PCR: NotDetec (01-04-23 @ 16:15)  COVID-19 PCR: NotDetec (01-03-23 @ 10:19)  COVID-19 PCR: NotDetec (03-29-22 @ 22:53)

## 2023-01-11 NOTE — PROGRESS NOTE ADULT - NS ATTEND AMEND GEN_ALL_CORE FT
I have personally seen and examined the patient with the NP. Medical records reviewed. I have made amendments to the documentation where necessary and adjusted the history, physical examination, and plan as documented by the NP.    No headache, no seizures  Neurologically, functionally and radiologically improving 11-Sep-2017 01:30

## 2023-01-11 NOTE — PROGRESS NOTE ADULT - ASSESSMENT
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     # Traumatic SDH  -s/p left [place] hole 12/29/22  -s/p brivaracetam  -continue to hold ASA per neurosurg    #Normocytic anemia  - Will monitor Hg/Hct, transfuse if Hg <7  - Iron/B12/folate WNL    #Rhabdomyolysis - improved  -elevated CK (now downtrending) and troponin  -EKG and TTE wnl    #Glaucoma  -c/w home eyedrops    #HTN  -c/w amlodipine 2.5mg daily    #HLD  -c/w atorvastatin    # DVT ppx:  lovenox

## 2023-01-11 NOTE — PROGRESS NOTE ADULT - ASSESSMENT
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     Admitted to [facility] acute inpatient rehab on 1/4/23 with cognitive deficits, and  ADL, gait, and functional impairments.     # Traumatic SDH  - Comprehensive Multidisciplinary Rehab Program: 3 hours a day, 5 days a week with PT/OT/SLP     P&O as needed  -s/p left [place] hole 12/29/22  --Seizure ppx:  brivaracetam completed- 7 days-on 1/5  -staples removed 1/8/23-healing well  -continue to hold ASA per neurosurg  -Repeat CT head 1/10-  Previously noted left-sided subdural hematoma is again identified. Decrease in size, mass effect and decreased left-to-right shift is seen.    Neuroendocrine w/u-  --AM cortisol, TSH and free T4 wnl    #Rhabdomyolysis - Resolved  -elevated CK (now downtrending) and troponin  -EKG and TTE wnl  -Repeat CK level-- 79    #Glaucoma  -c/w home eyedrops    #HTN  -c/w with decreased home dose of amlodipine    #HLD  -c/w atorvastatin    # Sleep:  - Maintain low stim environment    # Pain Management:  - Tylenol PRN    # GI/Bowel:  - Senna QHS, Miralax daily     # /Bladder:   - Encourage timed voids every 4 hours while awake       # Skin/Pressure Injury:  - Skin assessment on admission:  right lateral hip abrasion 5cm x 2cm likely secondary to fall at home-- apply wound gel, cavilon to periwound and cover with allvyn dressing.   - Monitor Incisions: left surgical clean incision intact staples removed 1/8    # Diet:   - Diet Consistency/Modifications: Regular diet  - Aspiration Precautions  - c/w  SLP treatment    # DVT ppx:  - lovenox  - Last Doppler on 12/29 negative for DVT    # Restrictions/Precautions:  - Precautions: falls, seizures, aspiration    IDT 1/10  Social work- Patient resides alone in an apartment with elevator access; independent prior  ST Mil- moderate language deficits, decreased problem solving skills, confabulations at times, poor awareness, impulsive, needs redirection  OT- Dressing, toileting, transfers- Min A, UBD- Supervision A.  PT- Min A with transfers, ambulates 50 ft with RW min A with WC follow  Goals- ambulate to bathroom with RW with supervision, appropriate call bell use, Improve problem solving and safety awareness   Dispo- 1/20 home vs [facility]

## 2023-01-11 NOTE — PROGRESS NOTE ADULT - SUBJECTIVE AND OBJECTIVE BOX
HPI:  82y female with a history of HTN, HLD, glaucoma who presented to [facility] on 12/28/22 after fall and found to have CTH with largely chronic L SDH (approx 2cm) w/ 8mm MLS.  Exam on admission was AOx2, PERRL, EOMI, no facial, slight R drift, LOWERY at least 4+/5.  On 12/29/22 patient underwent L francis hole for SDH evacuation. She was started on brivaracetam to complete a 7 day course (last dose to be 1/5 pm). Asprin continued to be held in the setting of SDH.  Hospital course notably for elevated CK and troponin  Cardiology consulted and work-up including EKG and TTE wnl.  CK downtrended throughout course and elevated CK and troponin likely secondary to acute rhabdomyolysis secondary to fall.  BP was monitored and amlodipine decreased to 2.5 mg (was 5 mg) for soft BP. Patient was evaluated by Physical Therapy and was recommended for Acute rehab to improve cognitive and physical function.      SUBJECTIVE/OBJECTIVE: Patient seen and examined while sitting in the wheelchair. No acute overnight events, slept well.  No HA, participating in therapy, excellent mood today, no HA.     REVIEW OF SYSTEMS  Constitutional: No fever, No Chills  Pulm: No cough,  No shortness of breath  Cardio: No chest pain, No palpitations  Neuro: + memory loss      VITALS  Vital Signs Last 24 Hrs  T(C): 36.6 (11 Jan 2023 07:57), Max: 36.9 (10 Yaron 2023 19:58)  T(F): 97.9 (11 Jan 2023 07:57), Max: 98.4 (10 Yaron 2023 19:58)  HR: 63 (11 Jan 2023 07:57) (63 - 79)  BP: 152/77 (11 Jan 2023 07:57) (112/64 - 152/77)  BP(mean): --  RR: 16 (11 Jan 2023 07:57) (14 - 16)  SpO2: 98% (11 Jan 2023 07:57) (98% - 100%)    Parameters below as of 11 Jan 2023 07:57  Patient On (Oxygen Delivery Method): room air      CURRENT MEDICATIONS  MEDICATIONS  (STANDING):  amLODIPine   Tablet 2.5 milliGRAM(s) Oral daily  atorvastatin 20 milliGRAM(s) Oral at bedtime  enoxaparin Injectable 40 milliGRAM(s) SubCutaneous <User Schedule>  latanoprost 0.005% Ophthalmic Solution 1 Drop(s) Both EYES at bedtime    MEDICATIONS  (PRN):  acetaminophen     Tablet .. 650 milliGRAM(s) Oral every 6 hours PRN Temp greater or equal to 38C (100.4F), Mild Pain (1 - 3)  polyethylene glycol 3350 17 Gram(s) Oral two times a day PRN Constipation      PHYSICAL EXAM  Constitutional - NAD, Comfortable  HEENT -left cranial surgical incision; staples removed 1/8-healing well  Chest - good chest expansion, good respiratory effort, CTAB   Cardio - warm and well perfused, RRR, no murmur  Abdomen -  Soft, NTND  Extremities - No peripheral edema, No calf tenderness   Neurologic Exam:                    Cognitive -             Orientation: Awake, Alert, AAO to self, place, year            Attention:  Impaired-- delayed processing     Speech - + word finding difficulties; Fluent, Comprehensible, No dysarthria, No aphasia, repetition intact     Motor -                      LEFT    UE - ShAB 5/5, EF 5/5, EE 5/5, WE 5/5,  WNL                    RIGHT UE - ShAB 5/5, EF 5/5, EE 5/5, WE 5/5,  WNL                    LEFT    LE - HF 3/5, KE 4/5, DF 5/5, PF 5/5                    RIGHT LE - HF 5/5, KE 5/5, DF 5/5, PF 5/5     Psychiatric - Mood stable, Affect WNL  Skin on admission: left surgical clean incision intact staples removed 1/8; right lateral hip abrasion 5cm x 2cm likely secondary to fall at home    Continue comprehensive acute rehab program consisting of 3hrs/day of OT/PT and SLP.

## 2023-01-12 LAB
ALBUMIN SERPL ELPH-MCNC: 2.4 G/DL — LOW (ref 3.3–5)
ALP SERPL-CCNC: 117 U/L — SIGNIFICANT CHANGE UP (ref 40–120)
ALT FLD-CCNC: 66 U/L — HIGH (ref 10–45)
ANION GAP SERPL CALC-SCNC: 9 MMOL/L — SIGNIFICANT CHANGE UP (ref 5–17)
AST SERPL-CCNC: 53 U/L — HIGH (ref 10–40)
BILIRUB SERPL-MCNC: 0.2 MG/DL — SIGNIFICANT CHANGE UP (ref 0.2–1.2)
BUN SERPL-MCNC: 15 MG/DL — SIGNIFICANT CHANGE UP (ref 7–23)
CALCIUM SERPL-MCNC: 8.8 MG/DL — SIGNIFICANT CHANGE UP (ref 8.4–10.5)
CHLORIDE SERPL-SCNC: 107 MMOL/L — SIGNIFICANT CHANGE UP (ref 96–108)
CO2 SERPL-SCNC: 29 MMOL/L — SIGNIFICANT CHANGE UP (ref 22–31)
CREAT SERPL-MCNC: 0.65 MG/DL — SIGNIFICANT CHANGE UP (ref 0.5–1.3)
EGFR: 88 ML/MIN/1.73M2 — SIGNIFICANT CHANGE UP
GLUCOSE SERPL-MCNC: 85 MG/DL — SIGNIFICANT CHANGE UP (ref 70–99)
HCT VFR BLD CALC: 34.2 % — LOW (ref 34.5–45)
HGB BLD-MCNC: 10.6 G/DL — LOW (ref 11.5–15.5)
MCHC RBC-ENTMCNC: 26.6 PG — LOW (ref 27–34)
MCHC RBC-ENTMCNC: 31 GM/DL — LOW (ref 32–36)
MCV RBC AUTO: 85.9 FL — SIGNIFICANT CHANGE UP (ref 80–100)
NRBC # BLD: 0 /100 WBCS — SIGNIFICANT CHANGE UP (ref 0–0)
PLATELET # BLD AUTO: 403 K/UL — HIGH (ref 150–400)
POTASSIUM SERPL-MCNC: 3.7 MMOL/L — SIGNIFICANT CHANGE UP (ref 3.5–5.3)
POTASSIUM SERPL-SCNC: 3.7 MMOL/L — SIGNIFICANT CHANGE UP (ref 3.5–5.3)
PROT SERPL-MCNC: 6.6 G/DL — SIGNIFICANT CHANGE UP (ref 6–8.3)
RBC # BLD: 3.98 M/UL — SIGNIFICANT CHANGE UP (ref 3.8–5.2)
RBC # FLD: 14.7 % — HIGH (ref 10.3–14.5)
SODIUM SERPL-SCNC: 145 MMOL/L — SIGNIFICANT CHANGE UP (ref 135–145)
WBC # BLD: 7.14 K/UL — SIGNIFICANT CHANGE UP (ref 3.8–10.5)
WBC # FLD AUTO: 7.14 K/UL — SIGNIFICANT CHANGE UP (ref 3.8–10.5)

## 2023-01-12 PROCEDURE — 99232 SBSQ HOSP IP/OBS MODERATE 35: CPT

## 2023-01-12 RX ADMIN — ENOXAPARIN SODIUM 40 MILLIGRAM(S): 100 INJECTION SUBCUTANEOUS at 18:06

## 2023-01-12 RX ADMIN — LATANOPROST 1 DROP(S): 0.05 SOLUTION/ DROPS OPHTHALMIC; TOPICAL at 21:40

## 2023-01-12 RX ADMIN — ATORVASTATIN CALCIUM 20 MILLIGRAM(S): 80 TABLET, FILM COATED ORAL at 21:41

## 2023-01-12 RX ADMIN — AMLODIPINE BESYLATE 2.5 MILLIGRAM(S): 2.5 TABLET ORAL at 05:20

## 2023-01-12 NOTE — PROGRESS NOTE ADULT - ASSESSMENT
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     Admitted to [facility] acute inpatient rehab on 1/4/23 with cognitive deficits, and  ADL, gait, and functional impairments.     # Traumatic SDH  - Comprehensive Multidisciplinary Rehab Program: 3 hours a day, 5 days a week with PT/OT/SLP     P&O as needed  -s/p left [place] hole 12/29/22  --Seizure ppx:  brivaracetam completed- 7 days-on 1/5  -staples removed 1/8/23-healing well  -continue to hold ASA per neurosurg  -Repeat CT head 1/10-  Previously noted left-sided subdural hematoma is again identified. Decrease in size, mass effect and decreased left-to-right shift is seen.    Neuroendocrine w/u-  --AM cortisol, TSH and free T4 wnl    #Rhabdomyolysis - Resolved  -elevated CK (now downtrending) and troponin  -EKG and TTE wnl  -Repeat CK level-- 79    #Glaucoma  -c/w home eyedrops    #HTN  -c/w with decreased home dose of amlodipine with parameters    #HLD  -c/w atorvastatin    # Sleep:  - Maintain low stim environment    # Pain Management:  - Tylenol PRN    # GI/Bowel:  - Senna QHS, Miralax daily     # /Bladder:   - Encourage timed voids every 4 hours while awake       # Skin/Pressure Injury:  - Skin assessment on admission:  right lateral hip abrasion 5cm x 2cm likely secondary to fall at home-- apply wound gel, cavilon to periwound and cover with allvyn dressing.   - Monitor Incisions: left surgical clean incision intact staples removed 1/8    # Diet:   - Diet Consistency/Modifications: Regular diet  - Aspiration Precautions  - c/w  SLP treatment    # DVT ppx:  - lovenox  - Last Doppler on 12/29 negative for DVT  -FU repeat Doppler for right calf pain    # Restrictions/Precautions:  - Precautions: falls, seizures, aspiration    IDT 1/10  Social work- Patient resides alone in an apartment with elevator access; independent prior  ST- Mil- moderate language deficits, decreased problem solving skills, confabulations at times, poor awareness, impulsive, needs redirection  OT- Dressing, toileting, transfers- Min A, UBD- Supervision A.  PT- Min A with transfers, ambulates 50 ft with RW min A with WC follow  Goals- ambulate to bathroom with RW with supervision, appropriate call bell use, Improve problem solving and safety awareness   Dispo- 1/20 home vs [facility]

## 2023-01-12 NOTE — PROGRESS NOTE ADULT - SUBJECTIVE AND OBJECTIVE BOX
Patient is a 82y old  Female who presents with a chief complaint of Traumatic SDH (11 Jan 2023 11:14)    No events overnight  Denies chest pain, SOB  Patient seen and examined at bedside.    ALLERGIES:  hydrochlorothiazide (Unknown)    MEDICATIONS  (STANDING):  amLODIPine   Tablet 2.5 milliGRAM(s) Oral daily  atorvastatin 20 milliGRAM(s) Oral at bedtime  enoxaparin Injectable 40 milliGRAM(s) SubCutaneous <User Schedule>  latanoprost 0.005% Ophthalmic Solution 1 Drop(s) Both EYES at bedtime    MEDICATIONS  (PRN):  acetaminophen     Tablet .. 650 milliGRAM(s) Oral every 6 hours PRN Temp greater or equal to 38C (100.4F), Mild Pain (1 - 3)  polyethylene glycol 3350 17 Gram(s) Oral two times a day PRN Constipation    Vital Signs Last 24 Hrs  T(F): 98.3 (12 Jan 2023 07:57), Max: 98.3 (12 Jan 2023 07:57)  HR: 67 (12 Jan 2023 09:17) (63 - 69)  BP: 105/60 (12 Jan 2023 09:17) (95/56 - 155/70)  RR: 16 (12 Jan 2023 07:57) (14 - 16)  SpO2: 100% (12 Jan 2023 09:17) (96% - 100%)  I&O's Summary      PHYSICAL EXAM:  General: NAD, A/O x 3  ENT: MMM, no scleral icterus  Neck: Supple, No JVD, no thyroidomegaly  Lungs: Clear to auscultation bilaterally, no wheezes, no rales, no rhonchi, good inspiratory effort  Cardio: RRR, S1/S2, No murmurs  Abdomen: Soft, Nontender, Nondistended; Bowel sounds present  Extremities: No calf tenderness, No pitting edema, no skin changes    LABS:                        10.6   7.14  )-----------( 403      ( 12 Jan 2023 06:30 )             34.2       01-12    145  |  107  |  15  ----------------------------<  85  3.7   |  29  |  0.65    Ca    8.8      12 Jan 2023 06:30    TPro  6.6  /  Alb  2.4  /  TBili  0.2  /  DBili  x   /  AST  53  /  ALT  66  /  AlkPhos  117  01-12 12-29 Chol 157 mg/dL LDL -- HDL 69 mg/dL Trig 66 mg/dL    COVID-19 PCR: NotDetec (01-04-23 @ 16:15)  COVID-19 PCR: NotDetec (01-03-23 @ 10:19)  COVID-19 PCR: NotDetec (01-04-23 @ 16:15)  COVID-19 PCR: NotDetec (01-03-23 @ 10:19)  COVID-19 PCR: NotDetec (03-29-22 @ 22:53)

## 2023-01-12 NOTE — PROGRESS NOTE ADULT - ATTENDING COMMENTS
I have personally seen and examined the patient with the resident. Medical records reviewed. I have made amendments to the documentation where necessary and adjusted the history, physical examination, and plan as documented by the resident.

## 2023-01-12 NOTE — PROGRESS NOTE ADULT - ASSESSMENT
82y female with a history of HTN, HLD, glaucoma who presented to 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     # Traumatic SDH  -s/p left Francis hole 12/29/22  -s/p brivaracetam  -continue to hold ASA per neurosurg    #Normocytic anemia  - Will monitor Hg/Hct, transfuse if Hg <7  - Iron/B12/folate WNL    #Rhabdomyolysis - improved  -elevated CK (now downtrending) and troponin  -EKG and TTE wnl    #Glaucoma  -c/w home eyedrops    #HTN  - BLood pressures labile; will monitor for now  -c/w amlodipine 2.5mg daily with holding parameters    #HLD  -c/w atorvastatin    # DVT ppx:  lovenox

## 2023-01-12 NOTE — PROGRESS NOTE ADULT - SUBJECTIVE AND OBJECTIVE BOX
Patient is a 82y old  Female who presents with a chief complaint of Traumatic SDH (12 Jan 2023 11:57)      HPI:  82y female with a history of HTN, HLD, glaucoma who presented to [facility] on 12/28/22 after fall and found to have CTH with largely chronic L SDH (approx 2cm) w/ 8mm MLS.  Exam on admission was AOx2, PERRL, EOMI, no facial, slight R drift, LOWERY at least 4+/5.  On 12/29/22 patient underwent L francis hole for SDH evacuation. She was started on brivaracetam to complete a 7 day course (last dose to be 1/5 pm). Asprin continued to be held in the setting of SDH.  Hospital course notably for elevated CK and troponin  Cardiology consulted and work-up including EKG and TTE wnl.  CK downtrended throughout course and elevated CK and troponin likely secondary to acute rhabdomyolysis secondary to fall.  BP was monitored and amlodipine decreased to 2.5 mg (was 5 mg) for soft BP. Patient was evaluated by Physical Therapy and was recommended for Acute rehab to improve cognitive and physical function.   (04 Jan 2023 14:18)      SUBJECTIVE HISTORY:  Patient seen on AM rounds.  She reports sleeping well.  She reports some right calf pain.  She states she wears compression socks at home.      REVIEW OF SYSTEMS:  Denies lightheadedness, HA, n/v   +right calf pain      PHYSICAL EXAM  Constitutional - NAD, Comfortable  HEENT -left cranial surgical incision; staples removed 1/8-healing well  Chest - good chest expansion, good respiratory effort, CTAB   Cardio - warm and well perfused, RRR, no murmur  Abdomen -  Soft, NTND  Extremities - +peripheral edema BL, +right calf tenderness  Neurologic Exam:                    Cognitive -             Orientation: Awake, Alert, AAO to self, place, year            Attention:  Impaired-- delayed processing     Speech - + word finding difficulties; Fluent, Comprehensible, No dysarthria, No aphasia, repetition intact     Motor -                      LEFT    UE - ShAB 5/5, EF 5/5, EE 5/5, WE 5/5,  WNL                    RIGHT UE - ShAB 5/5, EF 5/5, EE 5/5, WE 5/5,  WNL                    LEFT    LE - HF 3/5, KE 4/5, DF 5/5, PF 5/5                    RIGHT LE - HF 5/5, KE 5/5, DF 5/5, PF 5/5     Psychiatric - Mood stable, Affect WNL  Skin on admission: left surgical clean incision intact staples removed 1/8; right lateral hip abrasion 5cm x 2cm likely secondary to fall at home    VITALS  82y  Vital Signs Last 24 Hrs  T(C): 36.8 (12 Jan 2023 07:57), Max: 36.8 (12 Jan 2023 07:57)  T(F): 98.3 (12 Jan 2023 07:57), Max: 98.3 (12 Jan 2023 07:57)  HR: 67 (12 Jan 2023 09:17) (63 - 69)  BP: 105/60 (12 Jan 2023 09:17) (95/56 - 155/70)  BP(mean): --  RR: 16 (12 Jan 2023 07:57) (14 - 16)  SpO2: 100% (12 Jan 2023 09:17) (96% - 100%)    Parameters below as of 12 Jan 2023 09:17  Patient On (Oxygen Delivery Method): room air      Daily     Daily         RECENT LABS:                          10.6   7.14  )-----------( 403      ( 12 Jan 2023 06:30 )             34.2     01-12    145  |  107  |  15  ----------------------------<  85  3.7   |  29  |  0.65    Ca    8.8      12 Jan 2023 06:30    TPro  6.6  /  Alb  2.4<L>  /  TBili  0.2  /  DBili  x   /  AST  53<H>  /  ALT  66<H>  /  AlkPhos  117  01-12    LIVER FUNCTIONS - ( 12 Jan 2023 06:30 )  Alb: 2.4 g/dL / Pro: 6.6 g/dL / ALK PHOS: 117 U/L / ALT: 66 U/L / AST: 53 U/L / GGT: x                   CAPILLARY BLOOD GLUCOSE          MEDICATIONS  (STANDING):  amLODIPine   Tablet 2.5 milliGRAM(s) Oral daily  atorvastatin 20 milliGRAM(s) Oral at bedtime  enoxaparin Injectable 40 milliGRAM(s) SubCutaneous <User Schedule>  latanoprost 0.005% Ophthalmic Solution 1 Drop(s) Both EYES at bedtime    MEDICATIONS  (PRN):  acetaminophen     Tablet .. 650 milliGRAM(s) Oral every 6 hours PRN Temp greater or equal to 38C (100.4F), Mild Pain (1 - 3)  polyethylene glycol 3350 17 Gram(s) Oral two times a day PRN Constipation

## 2023-01-13 PROCEDURE — 99232 SBSQ HOSP IP/OBS MODERATE 35: CPT

## 2023-01-13 RX ADMIN — ENOXAPARIN SODIUM 40 MILLIGRAM(S): 100 INJECTION SUBCUTANEOUS at 18:09

## 2023-01-13 RX ADMIN — AMLODIPINE BESYLATE 2.5 MILLIGRAM(S): 2.5 TABLET ORAL at 05:00

## 2023-01-13 RX ADMIN — LATANOPROST 1 DROP(S): 0.05 SOLUTION/ DROPS OPHTHALMIC; TOPICAL at 21:26

## 2023-01-13 RX ADMIN — ATORVASTATIN CALCIUM 20 MILLIGRAM(S): 80 TABLET, FILM COATED ORAL at 21:25

## 2023-01-13 NOTE — PROGRESS NOTE ADULT - ATTENDING COMMENTS
Progress note amended to include my discussions with patient, resident, hospitalist, RN, SW, PT and my findings    Patient pleasant, cooperative with exam and questioning. She had calf pain yesterday; accompanied by some swelling. She reports history of lower leg discomfort and swelling due to circulatory issues which she wears compression stockings for. She states she hadn't been compliant with wear, but after developing symptoms and using stockings, has improvement in swelling and resolution pain. she states pain is same as before. Had refused doppler yesterday and this morning, and can verbalize understanding of why team is recommending test. AT this time , as her sx have improved and she has continued refusal, will dc test. Reinforced that if pain recurs despite use of TEDS, or if any other sx develop that suggest DVT, that doppler is important to do.    BP stable. Head CT 1/10 stable. Continue program Progress note amended to include my discussions with patient, resident, hospitalist, RN, SW, PT and my findings    Patient pleasant, cooperative with exam and questioning. She had calf pain yesterday; accompanied by some swelling. She reports history of lower leg discomfort and swelling due to circulatory issues which she wears compression stockings for. She states she hadn't been compliant with wear, but after developing symptoms and using stockings, has improvement in swelling and resolution pain. she states pain is same as before. Had refused doppler yesterday and this morning, and can verbalize understanding of why team is recommending test. AT this time , as her sx have improved and she has continued refusal, will dc test. Reinforced that if pain recurs despite use of TEDS, or if any other sx develop that suggest DVT, that doppler is important to do.    BP stable. Head CT 1/10 stable. Continue program. Voiding well, dc bladder scan and PVR

## 2023-01-13 NOTE — PROGRESS NOTE ADULT - ASSESSMENT
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     Admitted to [facility] acute inpatient [facility] on 1/4/23 with cognitive deficits, and  ADL, gait, and functional impairments.     # Traumatic SDH  - Comprehensive Multidisciplinary Rehab Program: 3 hours a day, 5 days a week with PT/OT/SLP     P&O as needed  -s/p left [place] hole 12/29/22  --Seizure ppx:  brivaracetam completed- 7 days-on 1/5  -staples removed 1/8/23-healing well  -continue to hold ASA per neurosurg  -Repeat CT head 1/10-  Previously noted left-sided subdural hematoma is again identified. Decrease in size, mass effect and decreased left-to-right shift is seen.    Neuroendocrine w/u-  --AM cortisol, TSH and free T4 wnl    #Rhabdomyolysis - Resolved  -elevated CK (now downtrending) and troponin  -EKG and TTE wnl  -Repeat CK level-- 79    #Glaucoma  -c/w home eyedrops    #HTN  -c/w with decreased home dose of amlodipine with parameters    #HLD  -c/w atorvastatin    # Sleep:  - Maintain low stim environment    # Pain Management:  - Tylenol PRN    # GI/Bowel:  - Senna QHS, Miralax daily     # /Bladder:   - Encourage timed voids every 4 hours while awake       # Skin/Pressure Injury:  - Skin assessment on admission:  right lateral hip abrasion 5cm x 2cm likely secondary to fall at home-- apply wound gel, cavilon to periwound and cover with allvyn dressing.   - Monitor Incisions: left surgical clean incision intact staples removed 1/8    # Diet:   - Diet Consistency/Modifications: Regular diet  - Aspiration Precautions  - c/w  SLP treatment    # DVT ppx:  - lovenox  - Last Doppler on 12/29 negative for DVT  -FU repeat Doppler for right calf pain    # Restrictions/Precautions:  - Precautions: falls, seizures, aspiration    IDT 1/10  Social work- Patient resides alone in an apartment with elevator access; independent prior  ST- Mil- moderate language deficits, decreased problem solving skills, confabulations at times, poor awareness, impulsive, needs redirection  OT- Dressing, toileting, transfers- Min A, UBD- Supervision A.  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     Admitted to [facility] acute inpatient rehab on 1/4/23 with cognitive deficits, and  ADL, gait, and functional impairments.     # Traumatic SDH  - Comprehensive Multidisciplinary Rehab Program: 3 hours a day, 5 days a week with PT/OT/SLP     P&O as needed  -s/p left [place] hole 12/29/22  --Seizure ppx:  brivaracetam completed- 7 days-on 1/5  -staples removed 1/8/23-healing well  -continue to hold ASA per neurosurg  -Repeat CT head 1/10-  Previously noted left-sided subdural hematoma is again identified. Decrease in size, mass effect and decreased left-to-right shift is seen.    Neuroendocrine w/u-  --AM cortisol, TSH and free T4 wnl    #Rhabdomyolysis - Resolved  -elevated CK (now downtrending) and troponin  -EKG and TTE wnl  -Repeat CK level-- 79    #Glaucoma  -c/w home eyedrops    #HTN  -c/w with decreased home dose of amlodipine with parameters    #HLD  -c/w atorvastatin    # Sleep:  - Maintain low stim environment    # Pain Management:  - Tylenol PRN    # GI/Bowel:  - Senna QHS, Miralax daily     # /Bladder:   - Encourage timed voids every 4 hours while awake       # Skin/Pressure Injury:  - Skin assessment on admission:  right lateral hip abrasion 5cm x 2cm likely secondary to fall at home-- apply wound gel, cavilon to periwound and cover with allvyn dressing.   - Monitor Incisions: left surgical clean incision intact staples removed 1/8    # Diet:   - Diet Consistency/Modifications: Regular diet  - Aspiration Precautions  - c/w  SLP treatment    # DVT ppx:  - lovenox  - Last Doppler on 12/29 negative for DVT  -Patient refused Doppler 1/12 and 1/13   -consider repeat Doppler if right calf pain returns    # Restrictions/Precautions:  - Precautions: falls, seizures, aspiration    IDT 1/10  Social work- Patient resides alone in an apartment with elevator access; independent prior  ST- Min- moderate language deficits, decreased problem solving skills, confabulations at times, poor awareness, impulsive, needs redirection  OT- Dressing, toileting, transfers- Min A, UBD- Supervision A.  PT- Min A with transfers, ambulates 50 ft with RW min A with WC follow  Goals- ambulate to bathroom with RW with supervision, appropriate call bell use, Improve problem solving and safety awareness   Dispo- 1/20 home vs [facility]   82y female with a history of HTN, HLD, glaucoma who presented to [facility] on 12/28/22 s/p fall. 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     # Traumatic SDH  -s/p left [place] hole 12/29/22  -Repeat CT head 1/10-  Previously noted left-sided subdural hematoma is again identified. Decrease in size, mass effect and decreased left-to-right shift is seen.  - Seizure ppx: completed- 7 days  - staples removed 1/8/23  - continue to hold ASA per neurosurg  - Continue comprehensive Multidisciplinary Rehab Program: 3 hours a day, 5 days a week with PT/OT/SLP    # Neuroendocrine w/u  - AM cortisol, TSH and free T4 wnl    # Rhabdomyolysis   - Resolved    # Glaucoma  -c/w home eyedrops    # HTN  - amlodipine with parameters  -  (105/60 - 128/70) 1/13    # HLD  -c/w atorvastatin    # Pain Management:  - Tylenol PRN    # GI/Bowel:  - Senna QHS, Miralax daily     # Skin/Pressure Injury:  - right lateral hip abrasion 5cm x 2cm likely secondary to fall at home-- apply wound gel, cavilon to periwound and cover with allvyn dressing.     # Diet:   -  Regular diet    # DVT ppx:  - lovenox  - Last Doppler on 12/29 negative for DVT  - Patient refused Doppler 1/12 and 1/13   - consider repeat Doppler if right calf pain returns, disucssed with patient  - MYAH    # Case discussed in IDT 1/10  -  Min- moderate language deficits, decreased problem solving skills, confabulations at times, poor awareness, impulsive, needs redirection. Dressing, toileting, transfers- Min A, UBD- Supervision A. Min A with transfers, ambulates 50 ft with RW min A with WC follow  - Goals- ambulate to bathroom with RW with supervision, appropriate call bell use, Improve problem solving and safety awareness   - target 1/20 home vs [facility]

## 2023-01-13 NOTE — PROGRESS NOTE ADULT - SUBJECTIVE AND OBJECTIVE BOX
Patient is a 82y old  Female who presents with a chief complaint of Traumatic SDH (13 Jan 2023 08:20)    Patient refusing LE ULT....states she has always had swelling in her legs and has to wear compression stockings  Denies chest pain, SOB  Tolerating diet    Patient seen and examined at bedside.    ALLERGIES:  hydrochlorothiazide (Unknown)    MEDICATIONS  (STANDING):  amLODIPine   Tablet 2.5 milliGRAM(s) Oral daily  atorvastatin 20 milliGRAM(s) Oral at bedtime  enoxaparin Injectable 40 milliGRAM(s) SubCutaneous <User Schedule>  latanoprost 0.005% Ophthalmic Solution 1 Drop(s) Both EYES at bedtime    MEDICATIONS  (PRN):  acetaminophen     Tablet .. 650 milliGRAM(s) Oral every 6 hours PRN Temp greater or equal to 38C (100.4F), Mild Pain (1 - 3)  polyethylene glycol 3350 17 Gram(s) Oral two times a day PRN Constipation    Vital Signs Last 24 Hrs  T(F): 98.7 (13 Jan 2023 08:42), Max: 98.8 (12 Jan 2023 20:44)  HR: 64 (13 Jan 2023 08:42) (61 - 102)  BP: 123/63 (13 Jan 2023 08:42) (120/63 - 128/70)  RR: 15 (13 Jan 2023 08:42) (15 - 16)  SpO2: 98% (13 Jan 2023 08:42) (93% - 98%)  I&O's Summary    PHYSICAL EXAM:  General: NAD, A/O x 3  ENT: MMM, no scleral icterus  Neck: Supple, No JVD, no thyroidomegaly  Lungs: Clear to auscultation bilaterally, no wheezes, no rales, no rhonchi, good inspiratory effort  Cardio: RRR, S1/S2, No murmurs  Abdomen: Soft, Nontender, Nondistended; Bowel sounds present  Extremities: No calf tenderness, slight edema noted in LE,  no skin changes    LABS:                        10.6   7.14  )-----------( 403      ( 12 Jan 2023 06:30 )             34.2       01-12    145  |  107  |  15  ----------------------------<  85  3.7   |  29  |  0.65    Ca    8.8      12 Jan 2023 06:30    TPro  6.6  /  Alb  2.4  /  TBili  0.2  /  DBili  x   /  AST  53  /  ALT  66  /  AlkPhos  117  01-12 12-29 Chol 157 mg/dL LDL -- HDL 69 mg/dL Trig 66 mg/dL    COVID-19 PCR: NotDetec (01-04-23 @ 16:15)  COVID-19 PCR: NotDetec (01-03-23 @ 10:19)  COVID-19 PCR: NotDetec (01-04-23 @ 16:15)  COVID-19 PCR: NotDetec (01-03-23 @ 10:19)  COVID-19 PCR: NotDetec (03-29-22 @ 22:53)    RADIOLOGY & ADDITIONAL TESTS:  VA Duplex Lower Ext Vein Scan, Bilat (12.29.22 @ 15:38) >  IMPRESSION:  No evidence of deep venous thrombosis in either lower extremity.

## 2023-01-13 NOTE — PROGRESS NOTE ADULT - SUBJECTIVE AND OBJECTIVE BOX
Patient is a 82y old  Female who presents with a chief complaint of Traumatic SDH (12 Jan 2023 14:42)      HPI:  82y female with a history of HTN, HLD, glaucoma who presented to [facility] on 12/28/22 after fall and found to have CTH with largely chronic L SDH (approx 2cm) w/ 8mm MLS.  Exam on admission was AOx2, PERRL, EOMI, no facial, slight R drift, LOWERY at least 4+/5.  On 12/29/22 patient underwent L francis hole for SDH evacuation. She was started on brivaracetam to complete a 7 day course (last dose to be 1/5 pm). Asprin continued to be held in the setting of SDH.  Hospital course notably for elevated CK and troponin  Cardiology consulted and work-up including EKG and TTE wnl.  CK downtrended throughout course and elevated CK and troponin likely secondary to acute rhabdomyolysis secondary to fall.  BP was monitored and amlodipine decreased to 2.5 mg (was 5 mg) for soft BP. Patient was evaluated by Physical Therapy and was recommended for Acute rehab to improve cognitive and physical function.   (04 Jan 2023 14:18)      SUBJECTIVE HISTORY:      REVIEW OF SYSTEMS:        PHYSICAL EXAM    VITALS  82y  Vital Signs Last 24 Hrs  T(C): 37.1 (12 Jan 2023 20:44), Max: 37.1 (12 Jan 2023 20:44)  T(F): 98.8 (12 Jan 2023 20:44), Max: 98.8 (12 Jan 2023 20:44)  HR: 61 (13 Jan 2023 05:03) (61 - 102)  BP: 128/70 (13 Jan 2023 05:03) (105/60 - 128/70)  BP(mean): --  RR: 16 (12 Jan 2023 20:44) (16 - 16)  SpO2: 93% (12 Jan 2023 20:44) (93% - 100%)    Parameters below as of 12 Jan 2023 09:17  Patient On (Oxygen Delivery Method): room air      Daily     Daily         RECENT LABS:                          10.6   7.14  )-----------( 403      ( 12 Jan 2023 06:30 )             34.2     01-12    145  |  107  |  15  ----------------------------<  85  3.7   |  29  |  0.65    Ca    8.8      12 Jan 2023 06:30    TPro  6.6  /  Alb  2.4<L>  /  TBili  0.2  /  DBili  x   /  AST  53<H>  /  ALT  66<H>  /  AlkPhos  117  01-12    LIVER FUNCTIONS - ( 12 Jan 2023 06:30 )  Alb: 2.4 g/dL / Pro: 6.6 g/dL / ALK PHOS: 117 U/L / ALT: 66 U/L / AST: 53 U/L / GGT: x                   CAPILLARY BLOOD GLUCOSE          MEDICATIONS  (STANDING):  amLODIPine   Tablet 2.5 milliGRAM(s) Oral daily  atorvastatin 20 milliGRAM(s) Oral at bedtime  enoxaparin Injectable 40 milliGRAM(s) SubCutaneous <User Schedule>  latanoprost 0.005% Ophthalmic Solution 1 Drop(s) Both EYES at bedtime    MEDICATIONS  (PRN):  acetaminophen     Tablet .. 650 milliGRAM(s) Oral every 6 hours PRN Temp greater or equal to 38C (100.4F), Mild Pain (1 - 3)  polyethylene glycol 3350 17 Gram(s) Oral two times a day PRN Constipation           Patient is a 82y old  Female who presents with a chief complaint of Traumatic SDH (12 Jan 2023 14:42)      HPI:  82y female with a history of HTN, HLD, glaucoma who presented to [facility] on 12/28/22 after fall and found to have CTH with largely chronic L SDH (approx 2cm) w/ 8mm MLS.  Exam on admission was AOx2, PERRL, EOMI, no facial, slight R drift, LOWERY at least 4+/5.  On 12/29/22 patient underwent L francis hole for SDH evacuation. She was started on brivaracetam to complete a 7 day course (last dose to be 1/5 pm). Asprin continued to be held in the setting of SDH.  Hospital course notably for elevated CK and troponin  Cardiology consulted and work-up including EKG and TTE wnl.  CK downtrended throughout course and elevated CK and troponin likely secondary to acute rhabdomyolysis secondary to fall.  BP was monitored and amlodipine decreased to 2.5 mg (was 5 mg) for soft BP. Patient was evaluated by Physical Therapy and was recommended for Acute rehab to improve cognitive and physical function.   (04 Jan 2023 14:18)      SUBJECTIVE HISTORY:  Patient seen this AM on rounds.  Patient reports sleeping well.  She reports no further calf pain.  Provided explanation for ultrasound doppler and details of the test.  Patient again refuses and states she will consider it at a later time.    REVIEW OF SYSTEMS:  Denies CP, SOB, dizziness, abdominal pain      PHYSICAL EXAM  Constitutional - NAD, Comfortable  HEENT -left cranial surgical incision; staples removed 1/8-healing well  Chest - good chest expansion, good respiratory effort, CTAB   Cardio - warm and well perfused, RRR, no murmur  Abdomen -  Soft, NTND  Extremities - +peripheral edema BL, +right calf tenderness  Neurologic Exam:                    Cognitive -             Orientation: Awake, Alert, AAO to self, place, year            Attention:  Impaired-- delayed processing     Speech - + word finding difficulties; Fluent, Comprehensible, No dysarthria, No aphasia, repetition intact     Motor -                      LEFT    UE - ShAB 5/5, EF 5/5, EE 5/5, WE 5/5,  WNL                    RIGHT UE - ShAB 5/5, EF 5/5, EE 5/5, WE 5/5,  WNL                    LEFT    LE - HF 3/5, KE 4/5, DF 5/5, PF 5/5                    RIGHT LE - HF 5/5, KE 5/5, DF 5/5, PF 5/5     Psychiatric - Mood stable, Affect WNL  Skin on admission: left surgical clean incision intact staples removed 1/8; right lateral hip abrasion 5cm x 2cm likely secondary to fall at home    VITALS  82y  Vital Signs Last 24 Hrs  T(C): 37.1 (12 Jan 2023 20:44), Max: 37.1 (12 Jan 2023 20:44)  T(F): 98.8 (12 Jan 2023 20:44), Max: 98.8 (12 Jan 2023 20:44)  HR: 61 (13 Jan 2023 05:03) (61 - 102)  BP: 128/70 (13 Jan 2023 05:03) (105/60 - 128/70)  BP(mean): --  RR: 16 (12 Jan 2023 20:44) (16 - 16)  SpO2: 93% (12 Jan 2023 20:44) (93% - 100%)    Parameters below as of 12 Jan 2023 09:17  Patient On (Oxygen Delivery Method): room air      Daily     Daily         RECENT LABS:                          10.6   7.14  )-----------( 403      ( 12 Jan 2023 06:30 )             34.2     01-12    145  |  107  |  15  ----------------------------<  85  3.7   |  29  |  0.65    Ca    8.8      12 Jan 2023 06:30    TPro  6.6  /  Alb  2.4<L>  /  TBili  0.2  /  DBili  x   /  AST  53<H>  /  ALT  66<H>  /  AlkPhos  117  01-12    LIVER FUNCTIONS - ( 12 Jan 2023 06:30 )  Alb: 2.4 g/dL / Pro: 6.6 g/dL / ALK PHOS: 117 U/L / ALT: 66 U/L / AST: 53 U/L / GGT: x                   CAPILLARY BLOOD GLUCOSE          MEDICATIONS  (STANDING):  amLODIPine   Tablet 2.5 milliGRAM(s) Oral daily  atorvastatin 20 milliGRAM(s) Oral at bedtime  enoxaparin Injectable 40 milliGRAM(s) SubCutaneous <User Schedule>  latanoprost 0.005% Ophthalmic Solution 1 Drop(s) Both EYES at bedtime    MEDICATIONS  (PRN):  acetaminophen     Tablet .. 650 milliGRAM(s) Oral every 6 hours PRN Temp greater or equal to 38C (100.4F), Mild Pain (1 - 3)  polyethylene glycol 3350 17 Gram(s) Oral two times a day PRN Constipation           Patient is a 82y old  Female who presents with a chief complaint of Traumatic SDH (12 Jan 2023 14:42)      HPI:  82y female with a history of HTN, HLD, glaucoma who presented to [facility] on 12/28/22 after fall and found to have CTH with largely chronic L SDH (approx 2cm) w/ 8mm MLS.  Exam on admission was AOx2, PERRL, EOMI, no facial, slight R drift, LOWERY at least 4+/5.  On 12/29/22 patient underwent L francis hole for SDH evacuation. She was started on brivaracetam to complete a 7 day course (last dose to be 1/5 pm). Asprin continued to be held in the setting of SDH.  Hospital course notably for elevated CK and troponin  Cardiology consulted and work-up including EKG and TTE wnl.  CK downtrended throughout course and elevated CK and troponin likely secondary to acute rhabdomyolysis secondary to fall.  BP was monitored and amlodipine decreased to 2.5 mg (was 5 mg) for soft BP. Patient was evaluated by Physical Therapy and was recommended for Acute rehab to improve cognitive and physical function.   (04 Jan 2023 14:18)      SUBJECTIVE HISTORY:  Patient seen this AM on rounds.  Patient reports sleeping well.  She reports no further calf pain.  Provided explanation for ultrasound doppler and details of the test.  Patient again refuses and states she will consider it at a later time.    REVIEW OF SYSTEMS:  Denies CP, SOB, dizziness, abdominal pain      PHYSICAL EXAM  Constitutional - NAD, Comfortable. Denies any further LE pain, pleasant, cooperative  HEENT -left cranial surgical incision; C/D/I  Chest - good chest expansion, good respiratory effort, CTAB   Cardio - warm and well perfused, RRR, no murmur  Abdomen -  Soft, NTND  Extremities - +LEEANNE in place  no calf TTP +soft no erythema or warmth +distensible        VITALS  82y  Vital Signs Last 24 Hrs  T(C): 37.1 (12 Jan 2023 20:44), Max: 37.1 (12 Jan 2023 20:44)  T(F): 98.8 (12 Jan 2023 20:44), Max: 98.8 (12 Jan 2023 20:44)  HR: 61 (13 Jan 2023 05:03) (61 - 102)  BP: 128/70 (13 Jan 2023 05:03) (105/60 - 128/70)  BP(mean): --  RR: 16 (12 Jan 2023 20:44) (16 - 16)  SpO2: 93% (12 Jan 2023 20:44) (93% - 100%)    Parameters below as of 12 Jan 2023 09:17  Patient On (Oxygen Delivery Method): room air      Daily     Daily         RECENT LABS:                          10.6   7.14  )-----------( 403      ( 12 Jan 2023 06:30 )             34.2     01-12    145  |  107  |  15  ----------------------------<  85  3.7   |  29  |  0.65    Ca    8.8      12 Jan 2023 06:30    TPro  6.6  /  Alb  2.4<L>  /  TBili  0.2  /  DBili  x   /  AST  53<H>  /  ALT  66<H>  /  AlkPhos  117  01-12    LIVER FUNCTIONS - ( 12 Jan 2023 06:30 )  Alb: 2.4 g/dL / Pro: 6.6 g/dL / ALK PHOS: 117 U/L / ALT: 66 U/L / AST: 53 U/L / GGT: x                   CAPILLARY BLOOD GLUCOSE          MEDICATIONS  (STANDING):  amLODIPine   Tablet 2.5 milliGRAM(s) Oral daily  atorvastatin 20 milliGRAM(s) Oral at bedtime  enoxaparin Injectable 40 milliGRAM(s) SubCutaneous <User Schedule>  latanoprost 0.005% Ophthalmic Solution 1 Drop(s) Both EYES at bedtime    MEDICATIONS  (PRN):  acetaminophen     Tablet .. 650 milliGRAM(s) Oral every 6 hours PRN Temp greater or equal to 38C (100.4F), Mild Pain (1 - 3)  polyethylene glycol 3350 17 Gram(s) Oral two times a day PRN Constipation

## 2023-01-13 NOTE — PROGRESS NOTE ADULT - ASSESSMENT
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     #LE edema  - Refusing repeat dopplers  - ULT on 12/29 negative for DVT  - Will c/w compression stockings    # Traumatic SDH  -s/p left Francis hole 12/29/22  -s/p brivaracetam  -continue to hold ASA per neurosurg    #Normocytic anemia  - Will monitor Hg/Hct, transfuse if Hg <7  - Iron/B12/folate WNL    #Rhabdomyolysis - improved  -elevated CK (now downtrending) and troponin  -EKG and TTE wnl    #Glaucoma  -c/w home eyedrops    #HTN  - Blood pressures labile; will monitor for now  -c/w amlodipine 2.5mg daily with holding parameters    #HLD  -c/w atorvastatin    # DVT ppx:  lovenox

## 2023-01-14 PROCEDURE — 99232 SBSQ HOSP IP/OBS MODERATE 35: CPT

## 2023-01-14 RX ADMIN — LATANOPROST 1 DROP(S): 0.05 SOLUTION/ DROPS OPHTHALMIC; TOPICAL at 21:50

## 2023-01-14 RX ADMIN — ENOXAPARIN SODIUM 40 MILLIGRAM(S): 100 INJECTION SUBCUTANEOUS at 18:07

## 2023-01-14 RX ADMIN — AMLODIPINE BESYLATE 2.5 MILLIGRAM(S): 2.5 TABLET ORAL at 05:23

## 2023-01-14 RX ADMIN — ATORVASTATIN CALCIUM 20 MILLIGRAM(S): 80 TABLET, FILM COATED ORAL at 21:50

## 2023-01-14 NOTE — PROGRESS NOTE ADULT - SUBJECTIVE AND OBJECTIVE BOX
Cc: Gait dysfunction    HPI: Patient with no new medical issues today.  Denies any pain.  Resting comfortably.    Has been tolerating rehabilitation program.    acetaminophen     Tablet .. 650 milliGRAM(s) Oral every 6 hours PRN  amLODIPine   Tablet 2.5 milliGRAM(s) Oral daily  atorvastatin 20 milliGRAM(s) Oral at bedtime  enoxaparin Injectable 40 milliGRAM(s) SubCutaneous <User Schedule>  latanoprost 0.005% Ophthalmic Solution 1 Drop(s) Both EYES at bedtime  polyethylene glycol 3350 17 Gram(s) Oral two times a day PRN      T(C): 37.1 (01-14-23 @ 07:42), Max: 37.1 (01-14-23 @ 07:42)  HR: 80 (01-14-23 @ 07:42) (62 - 80)  BP: 152/76 (01-14-23 @ 07:42) (122/61 - 153/64)  RR: 15 (01-14-23 @ 07:42) (15 - 15)  SpO2: 96% (01-14-23 @ 07:42) (96% - 98%)    In NAD  HEENT- EOMI  Heart- No cyanosis  Lungs- No respiratory distress   Abd- + BS, NT  Ext- No calf pain  Neuro- Exam unchanged      Imp: Patient with diagnosis of Traumatic SDH admitted for comprehensive acute rehabilitation.    Plan:  - Continue PT/OT/SLP  - DVT prophylaxis - Lovenox   - Skin- Turn q2h, check skin daily  - Continue current medications; patient medically stable.   -Active issues-   -HTN -  amlodipine  -HLD -  Atorvastatin  - Patient is stable to continue current rehabilitation program.

## 2023-01-14 NOTE — PROGRESS NOTE ADULT - SUBJECTIVE AND OBJECTIVE BOX
Patient is a 82y old  Female who presents with a chief complaint of Traumatic SDH (13 Jan 2023 11:14)      SUBJECTIVE / OVERNIGHT EVENTS:  Pt seen and examined at bedside. No acute events overnight.  Pt denies cp, palpitations, sob, abd pain, N/V, fever, chills.    ROS:  All other review of systems negative    Allergies    hydrochlorothiazide (Unknown)    Intolerances        MEDICATIONS  (STANDING):  amLODIPine   Tablet 2.5 milliGRAM(s) Oral daily  atorvastatin 20 milliGRAM(s) Oral at bedtime  enoxaparin Injectable 40 milliGRAM(s) SubCutaneous <User Schedule>  latanoprost 0.005% Ophthalmic Solution 1 Drop(s) Both EYES at bedtime    MEDICATIONS  (PRN):  acetaminophen     Tablet .. 650 milliGRAM(s) Oral every 6 hours PRN Temp greater or equal to 38C (100.4F), Mild Pain (1 - 3)  polyethylene glycol 3350 17 Gram(s) Oral two times a day PRN Constipation      Vital Signs Last 24 Hrs  T(C): 37.1 (14 Jan 2023 07:42), Max: 37.1 (14 Jan 2023 07:42)  T(F): 98.7 (14 Jan 2023 07:42), Max: 98.7 (14 Jan 2023 07:42)  HR: 80 (14 Jan 2023 07:42) (62 - 80)  BP: 152/76 (14 Jan 2023 07:42) (122/61 - 153/64)  BP(mean): --  RR: 15 (14 Jan 2023 07:42) (15 - 15)  SpO2: 96% (14 Jan 2023 07:42) (96% - 98%)    Parameters below as of 14 Jan 2023 07:42  Patient On (Oxygen Delivery Method): room air      CAPILLARY BLOOD GLUCOSE        I&O's Summary      PHYSICAL EXAM:  GENERAL: NAD, thin female  HEAD:  Atraumatic, Normocephalic  NECK: Supple, No JVD  CHEST/LUNG: Clear to auscultation bilaterally; No wheeze, nonlabored breathing  HEART: Regular rate and rhythm; No murmurs, rubs, or gallops  ABDOMEN: Soft, Nontender, Nondistended; Bowel sounds present  EXTREMITIES:  No clubbing, cyanosis, or edema  NEUROLOGY: AAOx3, non-focal  PSYCH: calm, appropriate mood    LABS:                    RADIOLOGY & ADDITIONAL TESTS:  Results Reviewed:   Imaging Personally Reviewed:  Electrocardiogram Personally Reviewed:    COORDINATION OF CARE:  Care Discussed with Consultants/Other Providers [Y/N]:  Prior or Outpatient Records Reviewed [Y/N]:

## 2023-01-14 NOTE — PROGRESS NOTE ADULT - ASSESSMENT
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     #LE edema  - Refusing repeat dopplers  - ULT on 12/29 negative for DVT  - Will c/w compression stockings  -Continue Lovenox DVT ppx    # Traumatic SDH  -s/p left Francis hole 12/29/22  -s/p brivaracetam  -continue to hold ASA per neurosurg    #Normocytic anemia  -Stable H/H  -Continue to monitor H/H    #Rhabdomyolysis   -Resolved    #HTN  -Continue amlodipine     # DVT ppx  -lovenox

## 2023-01-15 PROCEDURE — 99232 SBSQ HOSP IP/OBS MODERATE 35: CPT

## 2023-01-15 RX ADMIN — AMLODIPINE BESYLATE 2.5 MILLIGRAM(S): 2.5 TABLET ORAL at 05:06

## 2023-01-15 RX ADMIN — ATORVASTATIN CALCIUM 20 MILLIGRAM(S): 80 TABLET, FILM COATED ORAL at 21:44

## 2023-01-15 RX ADMIN — LATANOPROST 1 DROP(S): 0.05 SOLUTION/ DROPS OPHTHALMIC; TOPICAL at 21:44

## 2023-01-15 RX ADMIN — ENOXAPARIN SODIUM 40 MILLIGRAM(S): 100 INJECTION SUBCUTANEOUS at 18:02

## 2023-01-15 NOTE — PROGRESS NOTE ADULT - ASSESSMENT
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     #LE edema  - ULT on 12/29 negative for DVT  - Will c/w compression stockings  -Continue Lovenox DVT ppx  - Refusing repeat dopplers    # Traumatic SDH  -s/p left Francis hole 12/29/22  -s/p brivaracetam  -continue to hold ASA per neurosurg    #Normocytic anemia  -Stable H/H  -Continue to monitor H/H    #Rhabdomyolysis   -Resolved    #HTN  -Continue amlodipine     # DVT ppx  -lovenox

## 2023-01-15 NOTE — PROGRESS NOTE ADULT - SUBJECTIVE AND OBJECTIVE BOX
Cc: Impaired mobility    HPI: Patient with no new medical issues today.  Denies any calf pain.   Pain controlled, no chest pain, no N/V, no Fevers/Chills. No other new ROS  Has been tolerating rehabilitation program.    acetaminophen     Tablet .. 650 milliGRAM(s) Oral every 6 hours PRN  amLODIPine   Tablet 2.5 milliGRAM(s) Oral daily  atorvastatin 20 milliGRAM(s) Oral at bedtime  enoxaparin Injectable 40 milliGRAM(s) SubCutaneous <User Schedule>  latanoprost 0.005% Ophthalmic Solution 1 Drop(s) Both EYES at bedtime  polyethylene glycol 3350 17 Gram(s) Oral two times a day PRN      T(C): 36.7 (01-15-23 @ 08:42), Max: 36.9 (01-14-23 @ 19:23)  HR: 61 (01-15-23 @ 08:42) (60 - 61)  BP: 142/70 (01-15-23 @ 08:42) (110/69 - 147/71)  RR: 15 (01-15-23 @ 08:42) (15 - 16)  SpO2: 100% (01-15-23 @ 08:42) (96% - 100%)    In NAD  HEENT- EOMI  Heart- No cyanosis  Lungs- No respiratory distress   Abd- + BS, NT  Ext- No calf pain  Neuro- Exam unchanged      Imp: Patient with diagnosis of Traumatic SDH admitted for comprehensive acute rehabilitation.    Plan:  - Continue PT/OT/SLP  - DVT prophylaxis - Lovenox   - Skin- Turn q2h, check skin daily  - Continue current medications; patient medically stable.   -Active issues-   -HTN -  amlodipine  -HLD -  Atorvastatin  -Leg pain - resolved, no redness, erythema or increased warmth, monitor   - Patient is stable to continue current rehabilitation program.

## 2023-01-15 NOTE — PROGRESS NOTE ADULT - SUBJECTIVE AND OBJECTIVE BOX
Patient is a 82y old  Female who presents with a chief complaint of Traumatic SDH (14 Jan 2023 09:53)      SUBJECTIVE / OVERNIGHT EVENTS:  Pt seen and examined at bedside. No acute events overnight.  Pt denies cp, palpitations, sob, abd pain, N/V, fever, chills.    ROS:  All other review of systems negative    Allergies    hydrochlorothiazide (Unknown)    Intolerances        MEDICATIONS  (STANDING):  amLODIPine   Tablet 2.5 milliGRAM(s) Oral daily  atorvastatin 20 milliGRAM(s) Oral at bedtime  enoxaparin Injectable 40 milliGRAM(s) SubCutaneous <User Schedule>  latanoprost 0.005% Ophthalmic Solution 1 Drop(s) Both EYES at bedtime    MEDICATIONS  (PRN):  acetaminophen     Tablet .. 650 milliGRAM(s) Oral every 6 hours PRN Temp greater or equal to 38C (100.4F), Mild Pain (1 - 3)  polyethylene glycol 3350 17 Gram(s) Oral two times a day PRN Constipation      Vital Signs Last 24 Hrs  T(C): 36.7 (15 Yaron 2023 08:42), Max: 36.9 (14 Jan 2023 19:23)  T(F): 98.1 (15 Yaron 2023 08:42), Max: 98.5 (14 Jan 2023 19:23)  HR: 61 (15 Yaron 2023 08:42) (60 - 61)  BP: 142/70 (15 Yaron 2023 08:42) (110/69 - 147/71)  BP(mean): --  RR: 15 (15 Yaron 2023 08:42) (15 - 16)  SpO2: 100% (15 Yaron 2023 08:42) (96% - 100%)    Parameters below as of 15 Yaron 2023 08:42  Patient On (Oxygen Delivery Method): room air      CAPILLARY BLOOD GLUCOSE        I&O's Summary      PHYSICAL EXAM:  GENERAL: NAD, thin female  HEAD:  Atraumatic, Normocephalic  NECK: Supple, No JVD  CHEST/LUNG: Clear to auscultation bilaterally; No wheeze, nonlabored breathing  HEART: Regular rate and rhythm; No murmurs, rubs, or gallops  ABDOMEN: Soft, Nontender, Nondistended; Bowel sounds present  EXTREMITIES:  No clubbing, cyanosis, or edema  NEUROLOGY: AAOx3, non-focal  PSYCH: calm, appropriate mood    LABS:                    RADIOLOGY & ADDITIONAL TESTS:  Results Reviewed:   Imaging Personally Reviewed:  Electrocardiogram Personally Reviewed:    COORDINATION OF CARE:  Care Discussed with Consultants/Other Providers [Y/N]:  Prior or Outpatient Records Reviewed [Y/N]:

## 2023-01-16 PROCEDURE — 99232 SBSQ HOSP IP/OBS MODERATE 35: CPT

## 2023-01-16 RX ADMIN — ENOXAPARIN SODIUM 40 MILLIGRAM(S): 100 INJECTION SUBCUTANEOUS at 18:09

## 2023-01-16 RX ADMIN — ATORVASTATIN CALCIUM 20 MILLIGRAM(S): 80 TABLET, FILM COATED ORAL at 21:23

## 2023-01-16 RX ADMIN — LATANOPROST 1 DROP(S): 0.05 SOLUTION/ DROPS OPHTHALMIC; TOPICAL at 21:23

## 2023-01-16 NOTE — PROGRESS NOTE ADULT - ASSESSMENT
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     #LE edema  -ULT on 12/29 negative for DVT  -Will c/w compression stockings  -Continue Lovenox DVT ppx    #Traumatic SDH  -s/p left [place] hole 12/29/22  -s/p brivaracetam  -continue to hold ASA per neurosurg    #Normocytic anemia  -Stable H/H  -Continue to monitor H/H    #Rhabdomyolysis   -Resolved    #HTN  -Continue amlodipine     # DVT ppx  -lovenox

## 2023-01-16 NOTE — PROGRESS NOTE ADULT - SUBJECTIVE AND OBJECTIVE BOX
Patient is a 82y old  Female who presents with a chief complaint of Traumatic SDH (15 Yaron 2023 10:24)      SUBJECTIVE / OVERNIGHT EVENTS:  Pt seen and examined at bedside. No acute events overnight.  Pt denies cp, palpitations, sob, abd pain, N/V, fever, chills.    ROS:  All other review of systems negative    Allergies    hydrochlorothiazide (Unknown)    Intolerances        MEDICATIONS  (STANDING):  amLODIPine   Tablet 2.5 milliGRAM(s) Oral daily  atorvastatin 20 milliGRAM(s) Oral at bedtime  enoxaparin Injectable 40 milliGRAM(s) SubCutaneous <User Schedule>  latanoprost 0.005% Ophthalmic Solution 1 Drop(s) Both EYES at bedtime    MEDICATIONS  (PRN):  acetaminophen     Tablet .. 650 milliGRAM(s) Oral every 6 hours PRN Temp greater or equal to 38C (100.4F), Mild Pain (1 - 3)  polyethylene glycol 3350 17 Gram(s) Oral two times a day PRN Constipation      Vital Signs Last 24 Hrs  T(C): 36.7 (15 Yaron 2023 19:32), Max: 36.7 (15 Yaron 2023 08:42)  T(F): 98.1 (15 Yaron 2023 19:32), Max: 98.1 (15 Yaron 2023 08:42)  HR: 60 (16 Jan 2023 05:53) (60 - 62)  BP: 108/57 (16 Jan 2023 05:53) (108/56 - 142/70)  BP(mean): --  RR: 16 (16 Jan 2023 05:53) (15 - 16)  SpO2: 96% (16 Jan 2023 05:53) (96% - 100%)    Parameters below as of 16 Jan 2023 05:53  Patient On (Oxygen Delivery Method): room air      CAPILLARY BLOOD GLUCOSE        I&O's Summary      PHYSICAL EXAM:  GENERAL: NAD, thin female  HEAD:  Atraumatic, Normocephalic  NECK: Supple, No JVD  CHEST/LUNG: Clear to auscultation bilaterally; No wheeze, nonlabored breathing  HEART: Regular rate and rhythm; No murmurs, rubs, or gallops  ABDOMEN: Soft, Nontender, Nondistended; Bowel sounds present  EXTREMITIES:  No clubbing, cyanosis, or edema  PSYCH: calm, appropriate mood    LABS:                    RADIOLOGY & ADDITIONAL TESTS:  Results Reviewed:   Imaging Personally Reviewed:  Electrocardiogram Personally Reviewed:    COORDINATION OF CARE:  Care Discussed with Consultants/Other Providers [Y/N]:  Prior or Outpatient Records Reviewed [Y/N]:

## 2023-01-16 NOTE — PROGRESS NOTE ADULT - SUBJECTIVE AND OBJECTIVE BOX
Cc: Gait dysfunction    HPI: Patient with no new medical issues today.  Denies leg/calf pain.   Pain controlled, no chest pain, no N/V, no Fevers/Chills. No other new ROS  Has been tolerating rehabilitation program.    acetaminophen     Tablet .. 650 milliGRAM(s) Oral every 6 hours PRN  amLODIPine   Tablet 2.5 milliGRAM(s) Oral daily  atorvastatin 20 milliGRAM(s) Oral at bedtime  enoxaparin Injectable 40 milliGRAM(s) SubCutaneous <User Schedule>  latanoprost 0.005% Ophthalmic Solution 1 Drop(s) Both EYES at bedtime  polyethylene glycol 3350 17 Gram(s) Oral two times a day PRN      T(C): 36.8 (01-16-23 @ 08:55), Max: 36.8 (01-16-23 @ 08:55)  HR: 97 (01-16-23 @ 08:55) (60 - 97)  BP: 105/64 (01-16-23 @ 08:55) (105/64 - 108/57)  RR: 15 (01-16-23 @ 08:55) (15 - 16)  SpO2: 97% (01-16-23 @ 08:55) (96% - 97%)      In NAD  HEENT- EOMI  Heart- No cyanosis  Lungs- No respiratory distress   Abd- + BS, NT  Ext- No calf pain  Neuro- Exam unchanged      Imp: Patient with diagnosis of Traumatic SDH admitted for comprehensive acute rehabilitation.    Plan:  - Continue PT/OT/SLP  - DVT prophylaxis - Lovenox   - Skin- Turn q2h, check skin daily  - Continue current medications; patient medically stable.   -Active issues-   -HTN -  amlodipine  -HLD -  Atorvastatin  -Leg pain -Denies any pain, has not returned   -Constipation - miralax   - Patient is stable to continue current rehabilitation program.

## 2023-01-17 LAB
ALBUMIN SERPL ELPH-MCNC: 2.4 G/DL — LOW (ref 3.3–5)
ALP SERPL-CCNC: 108 U/L — SIGNIFICANT CHANGE UP (ref 40–120)
ALT FLD-CCNC: 35 U/L — SIGNIFICANT CHANGE UP (ref 10–45)
ANION GAP SERPL CALC-SCNC: 3 MMOL/L — LOW (ref 5–17)
AST SERPL-CCNC: 27 U/L — SIGNIFICANT CHANGE UP (ref 10–40)
BILIRUB SERPL-MCNC: 0.3 MG/DL — SIGNIFICANT CHANGE UP (ref 0.2–1.2)
BUN SERPL-MCNC: 13 MG/DL — SIGNIFICANT CHANGE UP (ref 7–23)
CALCIUM SERPL-MCNC: 8.6 MG/DL — SIGNIFICANT CHANGE UP (ref 8.4–10.5)
CHLORIDE SERPL-SCNC: 108 MMOL/L — SIGNIFICANT CHANGE UP (ref 96–108)
CO2 SERPL-SCNC: 32 MMOL/L — HIGH (ref 22–31)
CREAT SERPL-MCNC: 0.71 MG/DL — SIGNIFICANT CHANGE UP (ref 0.5–1.3)
EGFR: 85 ML/MIN/1.73M2 — SIGNIFICANT CHANGE UP
GLUCOSE SERPL-MCNC: 82 MG/DL — SIGNIFICANT CHANGE UP (ref 70–99)
HCT VFR BLD CALC: 33.7 % — LOW (ref 34.5–45)
HGB BLD-MCNC: 10.3 G/DL — LOW (ref 11.5–15.5)
MCHC RBC-ENTMCNC: 26.3 PG — LOW (ref 27–34)
MCHC RBC-ENTMCNC: 30.6 GM/DL — LOW (ref 32–36)
MCV RBC AUTO: 86.2 FL — SIGNIFICANT CHANGE UP (ref 80–100)
NRBC # BLD: 0 /100 WBCS — SIGNIFICANT CHANGE UP (ref 0–0)
PLATELET # BLD AUTO: 328 K/UL — SIGNIFICANT CHANGE UP (ref 150–400)
POTASSIUM SERPL-MCNC: 4.1 MMOL/L — SIGNIFICANT CHANGE UP (ref 3.5–5.3)
POTASSIUM SERPL-SCNC: 4.1 MMOL/L — SIGNIFICANT CHANGE UP (ref 3.5–5.3)
PROT SERPL-MCNC: 6.1 G/DL — SIGNIFICANT CHANGE UP (ref 6–8.3)
RBC # BLD: 3.91 M/UL — SIGNIFICANT CHANGE UP (ref 3.8–5.2)
RBC # FLD: 15.5 % — HIGH (ref 10.3–14.5)
SODIUM SERPL-SCNC: 143 MMOL/L — SIGNIFICANT CHANGE UP (ref 135–145)
WBC # BLD: 5.75 K/UL — SIGNIFICANT CHANGE UP (ref 3.8–10.5)
WBC # FLD AUTO: 5.75 K/UL — SIGNIFICANT CHANGE UP (ref 3.8–10.5)

## 2023-01-17 PROCEDURE — 99233 SBSQ HOSP IP/OBS HIGH 50: CPT

## 2023-01-17 PROCEDURE — 99232 SBSQ HOSP IP/OBS MODERATE 35: CPT

## 2023-01-17 RX ORDER — MEMANTINE HYDROCHLORIDE 10 MG/1
5 TABLET ORAL
Refills: 0 | Status: DISCONTINUED | OUTPATIENT
Start: 2023-01-18 | End: 2023-02-02

## 2023-01-17 RX ADMIN — ATORVASTATIN CALCIUM 20 MILLIGRAM(S): 80 TABLET, FILM COATED ORAL at 21:38

## 2023-01-17 RX ADMIN — AMLODIPINE BESYLATE 2.5 MILLIGRAM(S): 2.5 TABLET ORAL at 05:47

## 2023-01-17 RX ADMIN — LATANOPROST 1 DROP(S): 0.05 SOLUTION/ DROPS OPHTHALMIC; TOPICAL at 21:38

## 2023-01-17 NOTE — PROGRESS NOTE ADULT - SUBJECTIVE AND OBJECTIVE BOX
SUBJECTIVE/OBJECTIVE- Patient seen and examined while sitting in the wheelchair.  No acute overnight events, slept well. No current complaints.       REVIEW OF SYSTEMS  Denies headaches, SOB, Abdominal pain. N/V    VITALS  82y  Vital Signs Last 24 Hrs  T(C): 37.2 (17 Jan 2023 10:42), Max: 37.2 (17 Jan 2023 10:42)  T(F): 98.9 (17 Jan 2023 10:42), Max: 98.9 (17 Jan 2023 10:42)  HR: 80 (17 Jan 2023 10:42) (60 - 80)  BP: 100/63 (17 Jan 2023 10:42) (100/63 - 111/70)  RR: 16 (17 Jan 2023 10:42) (16 - 16)  SpO2: 97% (17 Jan 2023 10:42) (97% - 100%)    Parameters below as of 17 Jan 2023 10:42  Patient On (Oxygen Delivery Method): room air    PHYSICAL EXAM:   Constitutional - NAD, Comfortable  HEENT -left cranial surgical incision-healing well  Chest - good chest expansion, good respiratory effort, CTAB   Cardio - warm and well perfused, RRR, no murmur  Abdomen -  Soft, NTND  Extremities - +peripheral edema BL   Neurologic Exam:                    Cognitive -             Orientation: Awake and Alert to self, that she is in the hospital-- unsure of the name but able to say [place] with cues, able to pick correct year with multiple choice options            Attention:  Impaired-- delayed processing     Speech - + word finding difficulties; Fluent, Comprehensible, No dysarthria, No aphasia, repetition intact     Motor -                      LEFT    UE - ShAB 5/5, EF 5/5, EE 5/5, WE 5/5,  WNL                    RIGHT UE - ShAB 5/5, EF 5/5, EE 5/5, WE 5/5,  WNL                    LEFT    LE - HF 3/5, KE 4/5, DF 5/5, PF 5/5                    RIGHT LE - HF 5/5, KE 5/5, DF 5/5, PF 5/5     Psychiatric - Mood stable, Affect WNL  Skin on admission: left surgical clean incision intact staples removed 1/8; right lateral hip abrasion 5cm x 2cm likely secondary to fall at home      RECENT LABS:                        10.3   5.75  )-----------( 328      ( 17 Jan 2023 05:45 )             33.7     01-17    143  |  108  |  13  ----------------------------<  82  4.1   |  32<H>  |  0.71    Ca    8.6      17 Jan 2023 05:45    TPro  6.1  /  Alb  2.4<L>  /  TBili  0.3  /  DBili  x   /  AST  27  /  ALT  35  /  AlkPhos  108  01-17    LIVER FUNCTIONS - ( 17 Jan 2023 05:45 )  Alb: 2.4 g/dL / Pro: 6.1 g/dL / ALK PHOS: 108 U/L / ALT: 35 U/L / AST: 27 U/L / GGT: x             MEDICATIONS:  MEDICATIONS  (STANDING):  amLODIPine   Tablet 2.5 milliGRAM(s) Oral daily  atorvastatin 20 milliGRAM(s) Oral at bedtime  enoxaparin Injectable 40 milliGRAM(s) SubCutaneous <User Schedule>  latanoprost 0.005% Ophthalmic Solution 1 Drop(s) Both EYES at bedtime    MEDICATIONS  (PRN):  acetaminophen     Tablet .. 650 milliGRAM(s) Oral every 6 hours PRN Temp greater or equal to 38C (100.4F), Mild Pain (1 - 3)  polyethylene glycol 3350 17 Gram(s) Oral two times a day PRN Constipation

## 2023-01-17 NOTE — PROGRESS NOTE ADULT - ATTENDING COMMENTS
Pt. seen with resident & fellow.  Agree with documentation above as per fellow with amendments made as appropriate. Patient medically stable. Making progress towards rehab goals.     Left SDH s/p evacuation  Making progress in therapy.    -will benefit from trial of namenda 5mg bid    --family training with pt's daughter today-  --spoke with pt's daughter to provide update on pt's status, rehab plan of care, and discharge planning and needs.    Daughter requesting AR extension as she is working with elder care  to establish trust to provide care for patient.  Does not want [place] as had family that have had bad experiences (falls, etc) in [place].  Spoke with daughter regarding options and that pt's function is close to supervision and we can try to extend to next week contingent on insurance approval but likely not beyond next week.   Daughter understands and states she will follow up with  and give idea tomorrow how much longer needed for aide at home.  All questions answered.

## 2023-01-17 NOTE — PROGRESS NOTE ADULT - ASSESSMENT
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     Admitted to [facility] acute [facility] on 1/4/23 with cognitive deficits, and  ADL, gait, and functional impairments.     # Traumatic SDH  - Comprehensive Multidisciplinary Rehab Program: 3 hours a day, 5 days a week with PT/OT/SLP     P&O as needed  -s/p left Francis hole 12/29/22  --Seizure ppx:  brivaracetam completed- 7 days-on 1/5  -staples removed 1/8/23-healing well  -continue to hold ASA per neurosurg  -Repeat CT head 1/10-  Previously noted left-sided subdural hematoma is again identified. Decrease in size, mass effect and decreased left-to-right shift is seen.    Neuroendocrine w/u-  --AM cortisol, TSH and free T4 wnl    #Rhabdomyolysis - Resolved  -elevated CK (now downtrending) and troponin  -EKG and TTE wnl  -Repeat CK level-- 79    #Glaucoma  -c/w home eyedrops    #HTN  -c/w with decreased home dose of amlodipine with parameters    #HLD  -c/w atorvastatin    # Sleep:  - Maintain low stim environment    # Pain Management:  - Tylenol PRN    # GI/Bowel:  - Senna QHS, Miralax daily     # /Bladder:   - Encourage timed voids every 4 hours while awake       # Skin/Pressure Injury:  - Skin assessment on admission:  right lateral hip abrasion 5cm x 2cm likely secondary to fall at home-- apply wound gel, cavilon to periwound and cover with allvyn dressing.   - Monitor Incisions: left surgical clean incision intact staples removed 1/8    # Diet:   - Diet Consistency/Modifications: Regular diet  - Aspiration Precautions  - c/w  SLP treatment    # DVT ppx:  - lovenox  - Last Doppler on 12/29 negative for DVT  -FU repeat Doppler for right calf pain    # Restrictions/Precautions:  - Precautions: falls, seizures, aspiration    IDT 1/17  Social work- Patient resides alone in an apartment with elevator access; independent prior  ST- On Regular/thins, Ongoing barrier with cognition, Needs cues and reminders, impaired problem solving, will need assistance with meds   OT- Dressing, toileting, transfers- Min A, UBD- Supervision A.  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     Admitted to [facility] acute inpatient rehab on 1/4/23 with cognitive deficits, and  ADL, gait, and functional impairments.     # Traumatic SDH  - Comprehensive Multidisciplinary Rehab Program: 3 hours a day, 5 days a week with PT/OT/SLP     P&O as needed  -s/p left Francis hole 12/29/22  --Seizure ppx:  brivaracetam completed- 7 days-on 1/5  -staples removed 1/8/23-healing well  -continue to hold ASA per neurosurg  -Repeat CT head 1/10-  Previously noted left-sided subdural hematoma is again identified. Decrease in size, mass effect and decreased left-to-right shift is seen.    Cognitive deficits--  --will benefit from trial of namenda 5mg bid    Neuroendocrine w/u-  --AM cortisol, TSH and free T4 wnl    #Rhabdomyolysis - Resolved  -elevated CK (now downtrending) and troponin  -EKG and TTE wnl  -Repeat CK level-- 79    #Glaucoma  -c/w home eyedrops    #HTN  -c/w with decreased home dose of amlodipine with parameters    #HLD  -c/w atorvastatin    # Sleep:  - Maintain low stim environment    # Pain Management:  - Tylenol PRN    # GI/Bowel:  - Senna QHS, Miralax daily     # /Bladder:   - Encourage timed voids every 4 hours while awake       # Skin/Pressure Injury:  - Skin assessment on admission:  right lateral hip abrasion 5cm x 2cm likely secondary to fall at home-- apply wound gel, cavilon to periwound and cover with allvyn dressing.   - Monitor Incisions: left surgical clean incision intact staples removed 1/8    # Diet:   - Diet Consistency/Modifications: Regular diet  - Aspiration Precautions  - c/w  SLP treatment    # DVT ppx:  - lovenox  - Last Doppler on 12/29 negative for DVT  -FU repeat Doppler for right calf pain    # Restrictions/Precautions:  - Precautions: falls, seizures, aspiration    IDT 1/17  Social work- Patient resides alone in an apartment with elevator access; independent prior,   ST- On Regular/thins, Ongoing barrier with cognition--mild to moderate deficits, Needs cues and reminders, impaired problem solving, will need assistance with meds and finances  OT- Dressing, toileting, transfers- Min A, UBD, eating, grooming- Supervision A.  PT- CS with transfers, ambulates 150 ft with RW, CS, 8 Steps 2 rails with min A, needs cueing   Goals- ambulate to bathroom with RW with supervision, appropriate call bell use, Improve problem solving and safety awareness   Dispo- 1/20 home --Daughter requesting AR extension as she is working with elder care  to establish trust to provide care for patient.  Does not want [place]

## 2023-01-17 NOTE — PROGRESS NOTE ADULT - SUBJECTIVE AND OBJECTIVE BOX
Patient is a 82y old  Female who presents with a chief complaint of Traumatic SDH (16 Jan 2023 09:27)      SUBJECTIVE / OVERNIGHT EVENTS:  Pt seen and examined at bedside. No acute events overnight.  Pt denies cp, palpitations, sob, abd pain, N/V, fever, chills.    ROS:  All other review of systems negative    Allergies    hydrochlorothiazide (Unknown)    Intolerances        MEDICATIONS  (STANDING):  amLODIPine   Tablet 2.5 milliGRAM(s) Oral daily  atorvastatin 20 milliGRAM(s) Oral at bedtime  enoxaparin Injectable 40 milliGRAM(s) SubCutaneous <User Schedule>  latanoprost 0.005% Ophthalmic Solution 1 Drop(s) Both EYES at bedtime    MEDICATIONS  (PRN):  acetaminophen     Tablet .. 650 milliGRAM(s) Oral every 6 hours PRN Temp greater or equal to 38C (100.4F), Mild Pain (1 - 3)  polyethylene glycol 3350 17 Gram(s) Oral two times a day PRN Constipation      Vital Signs Last 24 Hrs  T(C): 37.1 (16 Jan 2023 20:06), Max: 37.1 (16 Jan 2023 20:06)  T(F): 98.7 (16 Jan 2023 20:06), Max: 98.7 (16 Jan 2023 20:06)  HR: 60 (17 Jan 2023 05:45) (60 - 97)  BP: 111/70 (17 Jan 2023 05:45) (105/64 - 111/70)  BP(mean): --  RR: 16 (16 Jan 2023 20:06) (15 - 16)  SpO2: 100% (16 Jan 2023 20:06) (97% - 100%)    Parameters below as of 16 Jan 2023 20:06  Patient On (Oxygen Delivery Method): room air      CAPILLARY BLOOD GLUCOSE        I&O's Summary      PHYSICAL EXAM:  GENERAL: NAD, thin female  HEAD:  Atraumatic, Normocephalic  NECK: Supple, No JVD  CHEST/LUNG: Clear to auscultation bilaterally; No wheeze, nonlabored breathing  HEART: Regular rate and rhythm; No murmurs, rubs, or gallops  ABDOMEN: Soft, Nontender, Nondistended; Bowel sounds present  EXTREMITIES:  No clubbing, cyanosis, or edema  PSYCH: calm, appropriate mood    LABS:                        10.3   5.75  )-----------( 328      ( 17 Jan 2023 05:45 )             33.7     01-17    143  |  108  |  13  ----------------------------<  82  4.1   |  32<H>  |  0.71    Ca    8.6      17 Jan 2023 05:45    TPro  6.1  /  Alb  2.4<L>  /  TBili  0.3  /  DBili  x   /  AST  27  /  ALT  35  /  AlkPhos  108  01-17              RADIOLOGY & ADDITIONAL TESTS:  Results Reviewed:   Imaging Personally Reviewed:  Electrocardiogram Personally Reviewed:    COORDINATION OF CARE:  Care Discussed with Consultants/Other Providers [Y/N]:  Prior or Outpatient Records Reviewed [Y/N]:

## 2023-01-17 NOTE — PROGRESS NOTE ADULT - ASSESSMENT
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     #LE edema  -ULT on 12/29 negative for DVT  -Will c/w compression stockings  -Continue Lovenox DVT ppx    #Traumatic SDH  -s/p left [place] hole 12/29/22  -s/p brivaracetam  -continue to hold ASA per neurosurg    #Normocytic anemia  -Stable H/H  -Continue to monitor H/H    #Rhabdomyolysis   -Resolved    #HTN  -Continue amlodipine     # DVT ppx  -lovenox

## 2023-01-18 PROCEDURE — 99232 SBSQ HOSP IP/OBS MODERATE 35: CPT

## 2023-01-18 RX ADMIN — MEMANTINE HYDROCHLORIDE 5 MILLIGRAM(S): 10 TABLET ORAL at 18:06

## 2023-01-18 RX ADMIN — ENOXAPARIN SODIUM 40 MILLIGRAM(S): 100 INJECTION SUBCUTANEOUS at 18:10

## 2023-01-18 RX ADMIN — ATORVASTATIN CALCIUM 20 MILLIGRAM(S): 80 TABLET, FILM COATED ORAL at 22:26

## 2023-01-18 RX ADMIN — MEMANTINE HYDROCHLORIDE 5 MILLIGRAM(S): 10 TABLET ORAL at 05:32

## 2023-01-18 RX ADMIN — LATANOPROST 1 DROP(S): 0.05 SOLUTION/ DROPS OPHTHALMIC; TOPICAL at 22:26

## 2023-01-18 NOTE — PROGRESS NOTE ADULT - SUBJECTIVE AND OBJECTIVE BOX
Patient is a 82y old  Female who presents with a chief complaint of Traumatic SDH (17 Jan 2023 13:40)      SUBJECTIVE / OVERNIGHT EVENTS:  Pt seen and examined at bedside. No acute events overnight.    Allergies    hydrochlorothiazide (Unknown)    Intolerances        MEDICATIONS  (STANDING):  amLODIPine   Tablet 2.5 milliGRAM(s) Oral daily  atorvastatin 20 milliGRAM(s) Oral at bedtime  enoxaparin Injectable 40 milliGRAM(s) SubCutaneous <User Schedule>  latanoprost 0.005% Ophthalmic Solution 1 Drop(s) Both EYES at bedtime  memantine 5 milliGRAM(s) Oral two times a day    MEDICATIONS  (PRN):  acetaminophen     Tablet .. 650 milliGRAM(s) Oral every 6 hours PRN Temp greater or equal to 38C (100.4F), Mild Pain (1 - 3)  polyethylene glycol 3350 17 Gram(s) Oral two times a day PRN Constipation      Vital Signs Last 24 Hrs  T(C): 36.8 (18 Jan 2023 07:56), Max: 37.2 (17 Jan 2023 10:42)  T(F): 98.2 (18 Jan 2023 07:56), Max: 98.9 (17 Jan 2023 10:42)  HR: 86 (18 Jan 2023 07:56) (66 - 86)  BP: 124/70 (18 Jan 2023 07:56) (100/63 - 132/80)  BP(mean): --  RR: 16 (18 Jan 2023 07:56) (16 - 16)  SpO2: 95% (18 Jan 2023 07:56) (95% - 100%)    Parameters below as of 18 Jan 2023 07:56  Patient On (Oxygen Delivery Method): room air      CAPILLARY BLOOD GLUCOSE        I&O's Summary      PHYSICAL EXAM:  GENERAL: NAD, thin female  HEAD:  Atraumatic, Normocephalic  NECK: Supple, No JVD  CHEST/LUNG: Clear to auscultation bilaterally; No wheeze, nonlabored breathing  HEART: Regular rate and rhythm; No murmurs, rubs, or gallops  ABDOMEN: Soft, Nontender, Nondistended; Bowel sounds present  EXTREMITIES:  No clubbing, cyanosis, or edema  PSYCH: calm, appropriate mood    LABS:                        10.3   5.75  )-----------( 328      ( 17 Jan 2023 05:45 )             33.7     01-17    143  |  108  |  13  ----------------------------<  82  4.1   |  32<H>  |  0.71    Ca    8.6      17 Jan 2023 05:45    TPro  6.1  /  Alb  2.4<L>  /  TBili  0.3  /  DBili  x   /  AST  27  /  ALT  35  /  AlkPhos  108  01-17              RADIOLOGY & ADDITIONAL TESTS:  Results Reviewed:   Imaging Personally Reviewed:  Electrocardiogram Personally Reviewed:    COORDINATION OF CARE:  Care Discussed with Consultants/Other Providers [Y/N]:  Prior or Outpatient Records Reviewed [Y/N]:

## 2023-01-18 NOTE — PROGRESS NOTE ADULT - ASSESSMENT
82y female with a history of HTN, HLD, glaucoma who presented to 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     #LE edema  -ULT on 12/29 negative for DVT  -Will c/w compression stockings  -Continue Lovenox DVT ppx    #Traumatic SDH  -s/p left [place] hole 12/29/22  -s/p brivaracetam  -continue to hold ASA per neurosurg    #Normocytic anemia  -Stable H/H  -Continue to monitor H/H    #Rhabdomyolysis   -Resolved    #HTN  -Continue amlodipine     # DVT ppx  -lovenox

## 2023-01-18 NOTE — PROGRESS NOTE ADULT - ASSESSMENT
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     Admitted to [place] acute inpatient rehab on 1/4/23 with cognitive deficits, and  ADL, gait, and functional impairments.     # Traumatic SDH  - Comprehensive Multidisciplinary Rehab Program: 3 hours a day, 5 days a week with PT/OT/SLP     P&O as needed  -s/p left Francis hole 12/29/22  --Seizure ppx:  brivaracetam completed- 7 days-on 1/5  -staples removed 1/8/23-healing well  -continue to hold ASA per neurosurg  -Repeat CT head 1/10-  Previously noted left-sided subdural hematoma is again identified. Decrease in size, mass effect and decreased left-to-right shift is seen.    Cognitive deficits--  -Trial of namenda 5mg bid-- no adverse effects, no GI upset, N/V    Neuroendocrine w/u-  --AM cortisol, TSH and free T4 wnl    #Rhabdomyolysis - Resolved  -elevated CK (now downtrending) and troponin  -EKG and TTE wnl  -Repeat CK level-- 79    #Glaucoma  -c/w home eyedrops    #HTN  -c/w with decreased home dose of amlodipine with parameters    #HLD  -c/w atorvastatin    # Sleep:  - Maintain low stim environment    # Pain Management:  - Tylenol PRN    # GI/Bowel:  - Senna QHS, Miralax daily     # /Bladder:   - Encourage timed voids every 4 hours while awake       # Skin/Pressure Injury:  - Skin assessment on admission:  right lateral hip abrasion 5cm x 2cm likely secondary to fall at home-- apply wound gel, cavilon to periwound and cover with allvyn dressing.   - Monitor Incisions: left surgical clean incision intact staples removed 1/8    # Diet:   - Diet Consistency/Modifications: Regular diet  - Aspiration Precautions  - c/w  SLP treatment    # DVT ppx:  - lovenox  - Last Doppler on 12/29 negative for DVT  -FU repeat Doppler for right calf pain    # Restrictions/Precautions:  - Precautions: falls, seizures, aspiration    IDT 1/17  Social work- Patient resides alone in an apartment with elevator access; independent prior,   ST- On Regular/thins, Ongoing barrier with cognition--mild to moderate deficits, Needs cues and reminders, impaired problem solving, will need assistance with meds and finances  OT- Dressing, toileting, transfers- Min A, UBD, eating, grooming- Supervision A.  PT- CS with transfers, ambulates 150 ft with RW, CS, 8 Steps 2 rails with min A, needs cueing   Goals- ambulate to bathroom with RW with supervision, appropriate call bell use, Improve problem solving and safety awareness   Dispo- 1/20 home --Daughter requesting AR extension as she is working with elder care  to establish trust to provide care for patient.  Does not want MIRIAM

## 2023-01-18 NOTE — PROGRESS NOTE ADULT - ATTENDING COMMENTS
Pt. seen this AM.  Agree with documentation above as per fellow with amendments made as appropriate. Patient medically stable. Making progress towards rehab goals.     left SDH s/p evac  tolerating namenda 5mg BID.

## 2023-01-18 NOTE — PROGRESS NOTE ADULT - SUBJECTIVE AND OBJECTIVE BOX
SUBJECTIVE: Patient seen and examined in the therapy gym. No acute overnight events, slept well. No other complaints.      REVIEW OF SYSTEMS  Denies SOB, CP, Abdomina; pain    VITALS  82y  Vital Signs Last 24 Hrs  T(C): 36.8 (18 Jan 2023 07:56), Max: 37 (17 Jan 2023 20:20)  T(F): 98.2 (18 Jan 2023 07:56), Max: 98.6 (17 Jan 2023 20:20)  HR: 86 (18 Jan 2023 07:56) (66 - 86)  BP: 124/70 (18 Jan 2023 07:56) (107/63 - 132/80)  RR: 16 (18 Jan 2023 07:56) (16 - 16)  SpO2: 95% (18 Jan 2023 07:56) (95% - 100%)    Parameters below as of 18 Jan 2023 07:56  Patient On (Oxygen Delivery Method): room air      PHYSICAL EXAM:   Constitutional - NAD, Comfortable  HEENT -left cranial surgical incision-healing well  Chest - good chest expansion, good respiratory effort, CTAB   Cardio - warm and well perfused, RRR, no murmur  Abdomen -  Soft, NTND  Extremities - +peripheral edema BL   Neurologic Exam:                    Cognitive -             Orientation: Awake and Alert to self, that she is in the hospital-- unsure of the name but able to say [place] with cues, able to pick correct year with multiple choice options            Attention:  Impaired-- delayed processing     Speech - + word finding difficulties; Fluent, Comprehensible, No dysarthria, No aphasia, repetition intact     Motor -                      LEFT    UE - ShAB 5/5, EF 5/5, EE 5/5, WE 5/5,  WNL                    RIGHT UE - ShAB 5/5, EF 5/5, EE 5/5, WE 5/5,  WNL                    LEFT    LE - HF 3/5, KE 4/5, DF 5/5, PF 5/5                    RIGHT LE - HF 5/5, KE 5/5, DF 5/5, PF 5/5     Psychiatric - Mood stable, Affect WNL  Skin on admission: left surgical clean incision intact staples removed 1/8; right lateral hip abrasion 5cm x 2cm likely secondary to fall at home      RECENT LABS:                        10.3   5.75  )-----------( 328      ( 17 Jan 2023 05:45 )             33.7     01-17    143  |  108  |  13  ----------------------------<  82  4.1   |  32<H>  |  0.71    Ca    8.6      17 Jan 2023 05:45    TPro  6.1  /  Alb  2.4<L>  /  TBili  0.3  /  DBili  x   /  AST  27  /  ALT  35  /  AlkPhos  108  01-17    LIVER FUNCTIONS - ( 17 Jan 2023 05:45 )  Alb: 2.4 g/dL / Pro: 6.1 g/dL / ALK PHOS: 108 U/L / ALT: 35 U/L / AST: 27 U/L / GGT: x             MEDICATIONS:  MEDICATIONS  (STANDING):  amLODIPine   Tablet 2.5 milliGRAM(s) Oral daily  atorvastatin 20 milliGRAM(s) Oral at bedtime  enoxaparin Injectable 40 milliGRAM(s) SubCutaneous <User Schedule>  latanoprost 0.005% Ophthalmic Solution 1 Drop(s) Both EYES at bedtime  memantine 5 milliGRAM(s) Oral two times a day    MEDICATIONS  (PRN):  acetaminophen     Tablet .. 650 milliGRAM(s) Oral every 6 hours PRN Temp greater or equal to 38C (100.4F), Mild Pain (1 - 3)  polyethylene glycol 3350 17 Gram(s) Oral two times a day PRN Constipation

## 2023-01-19 PROCEDURE — 99232 SBSQ HOSP IP/OBS MODERATE 35: CPT

## 2023-01-19 RX ADMIN — AMLODIPINE BESYLATE 2.5 MILLIGRAM(S): 2.5 TABLET ORAL at 05:26

## 2023-01-19 RX ADMIN — MEMANTINE HYDROCHLORIDE 5 MILLIGRAM(S): 10 TABLET ORAL at 18:04

## 2023-01-19 RX ADMIN — MEMANTINE HYDROCHLORIDE 5 MILLIGRAM(S): 10 TABLET ORAL at 05:26

## 2023-01-19 RX ADMIN — LATANOPROST 1 DROP(S): 0.05 SOLUTION/ DROPS OPHTHALMIC; TOPICAL at 22:08

## 2023-01-19 RX ADMIN — ENOXAPARIN SODIUM 40 MILLIGRAM(S): 100 INJECTION SUBCUTANEOUS at 18:04

## 2023-01-19 RX ADMIN — ATORVASTATIN CALCIUM 20 MILLIGRAM(S): 80 TABLET, FILM COATED ORAL at 22:08

## 2023-01-19 NOTE — PROGRESS NOTE ADULT - ASSESSMENT
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     #LE edema  -ULT on 12/29 negative for DVT  -Will c/w compression stockings  -Continue Lovenox DVT ppx    #Traumatic SDH  -s/p left Adams hole 12/29/22  -s/p brivaracetam  -continue to hold ASA per neurosurg    #Normocytic anemia  -Stable H/H  -Continue to monitor H/H    #Rhabdomyolysis   -Resolved    #HTN  -Continue amlodipine     # DVT ppx  -lovenox

## 2023-01-19 NOTE — PROGRESS NOTE ADULT - ATTENDING COMMENTS
Pt. seen with resident.  Agree with documentation above as per resident with amendments made as appropriate. Patient medically stable. Making progress towards rehab goals.     SDH  stable.

## 2023-01-19 NOTE — PROGRESS NOTE ADULT - SUBJECTIVE AND OBJECTIVE BOX
Patient is a 82y old  Female who presents with a chief complaint of Traumatic SDH (19 Jan 2023 08:32)      SUBJECTIVE / OVERNIGHT EVENTS:  Pt seen and examined at bedside. No acute events overnight.  Pt denies cp, palpitations, sob, abd pain, N/V, fever, chills.    ROS:  All other review of systems negative    Allergies    hydrochlorothiazide (Unknown)    Intolerances        MEDICATIONS  (STANDING):  amLODIPine   Tablet 2.5 milliGRAM(s) Oral daily  atorvastatin 20 milliGRAM(s) Oral at bedtime  enoxaparin Injectable 40 milliGRAM(s) SubCutaneous <User Schedule>  latanoprost 0.005% Ophthalmic Solution 1 Drop(s) Both EYES at bedtime  memantine 5 milliGRAM(s) Oral two times a day    MEDICATIONS  (PRN):  acetaminophen     Tablet .. 650 milliGRAM(s) Oral every 6 hours PRN Temp greater or equal to 38C (100.4F), Mild Pain (1 - 3)  polyethylene glycol 3350 17 Gram(s) Oral two times a day PRN Constipation      Vital Signs Last 24 Hrs  T(C): 36.3 (19 Jan 2023 08:18), Max: 36.9 (19 Jan 2023 05:27)  T(F): 97.4 (19 Jan 2023 08:18), Max: 98.4 (19 Jan 2023 05:27)  HR: 73 (19 Jan 2023 08:18) (68 - 73)  BP: 100/63 (19 Jan 2023 08:18) (100/63 - 125/72)  BP(mean): --  RR: 15 (19 Jan 2023 08:18) (15 - 15)  SpO2: 94% (19 Jan 2023 08:18) (94% - 97%)    Parameters below as of 19 Jan 2023 08:18  Patient On (Oxygen Delivery Method): room air      CAPILLARY BLOOD GLUCOSE        I&O's Summary      PHYSICAL EXAM:  GENERAL: NAD, thin female  HEAD:  Atraumatic, Normocephalic  NECK: Supple, No JVD  CHEST/LUNG: Clear to auscultation bilaterally; No wheeze, nonlabored breathing  HEART: Regular rate and rhythm; No murmurs, rubs, or gallops  ABDOMEN: Soft, Nontender, Nondistended; Bowel sounds present  EXTREMITIES:  No clubbing, cyanosis, or edema  PSYCH: calm, appropriate mood      LABS:                    RADIOLOGY & ADDITIONAL TESTS:  Results Reviewed:   Imaging Personally Reviewed:  Electrocardiogram Personally Reviewed:    COORDINATION OF CARE:  Care Discussed with Consultants/Other Providers [Y/N]:  Prior or Outpatient Records Reviewed [Y/N]:

## 2023-01-19 NOTE — PROGRESS NOTE ADULT - SUBJECTIVE AND OBJECTIVE BOX
Patient is a 82y old  Female who presents with a chief complaint of Traumatic SDH (18 Jan 2023 15:53)      HPI:  82y female with a history of HTN, HLD, glaucoma who presented to [facility] on 12/28/22 after fall and found to have CTH with largely chronic L SDH (approx 2cm) w/ 8mm MLS.  Exam on admission was AOx2, PERRL, EOMI, no facial, slight R drift, LOWERY at least 4+/5.  On 12/29/22 patient underwent L francis hole for SDH evacuation. She was started on brivaracetam to complete a 7 day course (last dose to be 1/5 pm). Asprin continued to be held in the setting of SDH.  Hospital course notably for elevated CK and troponin  Cardiology consulted and work-up including EKG and TTE wnl.  CK downtrended throughout course and elevated CK and troponin likely secondary to acute rhabdomyolysis secondary to fall.  BP was monitored and amlodipine decreased to 2.5 mg (was 5 mg) for soft BP. Patient was evaluated by Physical Therapy and was recommended for Acute rehab to improve cognitive and physical function.   (04 Jan 2023 14:18)      SUBJECTIVE HISTORY:      REVIEW OF SYSTEMS:        PHYSICAL EXAM    VITALS  82y  Vital Signs Last 24 Hrs  T(C): 36.3 (19 Jan 2023 08:18), Max: 36.9 (19 Jan 2023 05:27)  T(F): 97.4 (19 Jan 2023 08:18), Max: 98.4 (19 Jan 2023 05:27)  HR: 73 (19 Jan 2023 08:18) (68 - 73)  BP: 100/63 (19 Jan 2023 08:18) (100/63 - 125/72)  BP(mean): --  RR: 15 (19 Jan 2023 08:18) (15 - 15)  SpO2: 94% (19 Jan 2023 08:18) (94% - 97%)    Parameters below as of 19 Jan 2023 08:18  Patient On (Oxygen Delivery Method): room air      Daily     Daily         RECENT LABS:                      CAPILLARY BLOOD GLUCOSE          MEDICATIONS  (STANDING):  amLODIPine   Tablet 2.5 milliGRAM(s) Oral daily  atorvastatin 20 milliGRAM(s) Oral at bedtime  enoxaparin Injectable 40 milliGRAM(s) SubCutaneous <User Schedule>  latanoprost 0.005% Ophthalmic Solution 1 Drop(s) Both EYES at bedtime  memantine 5 milliGRAM(s) Oral two times a day    MEDICATIONS  (PRN):  acetaminophen     Tablet .. 650 milliGRAM(s) Oral every 6 hours PRN Temp greater or equal to 38C (100.4F), Mild Pain (1 - 3)  polyethylene glycol 3350 17 Gram(s) Oral two times a day PRN Constipation           Patient is a 82y old  Female who presents with a chief complaint of Traumatic SDH (18 Jan 2023 15:53)      HPI:  82y female with a history of HTN, HLD, glaucoma who presented to [facility] on 12/28/22 after fall and found to have CTH with largely chronic L SDH (approx 2cm) w/ 8mm MLS.  Exam on admission was AOx2, PERRL, EOMI, no facial, slight R drift, LOWERY at least 4+/5.  On 12/29/22 patient underwent L francis hole for SDH evacuation. She was started on brivaracetam to complete a 7 day course (last dose to be 1/5 pm). Asprin continued to be held in the setting of SDH.  Hospital course notably for elevated CK and troponin  Cardiology consulted and work-up including EKG and TTE wnl.  CK downtrended throughout course and elevated CK and troponin likely secondary to acute rhabdomyolysis secondary to fall.  BP was monitored and amlodipine decreased to 2.5 mg (was 5 mg) for soft BP. Patient was evaluated by Physical Therapy and was recommended for Acute rehab to improve cognitive and physical function.   (04 Jan 2023 14:18)      SUBJECTIVE HISTORY:  Patient seen and evaluated at bedside.  Patient in good spirits.  Reports sleeping well.  Is without complains    REVIEW OF SYSTEMS:  Denies HA, abdominal pain, calf pain      PHYSICAL EXAM  Constitutional - NAD, Comfortable  HEENT -left cranial surgical incision-healing well  Chest - good chest expansion, good respiratory effort, CTAB   Cardio - warm and well perfused, RRR, no murmur  Abdomen -  Soft, NTND  Extremities - +peripheral edema BL   Neurologic Exam:                    Cognitive -             Orientation: Awake and Alert to self, year,  that she is in the hospital-- unsure of the name but able to say [place] with cues            Attention:  Impaired-- delayed processing     Speech - + word finding difficulties; Fluent, Comprehensible, No dysarthria, No aphasia, repetition intact     Motor -                      LEFT    UE - ShAB 5/5, EF 5/5, EE 5/5, WE 5/5,  WNL                    RIGHT UE - ShAB 5/5, EF 5/5, EE 5/5, WE 5/5,  WNL                    LEFT    LE - HF 3/5, KE 4/5, DF 5/5, PF 5/5                    RIGHT LE - HF 5/5, KE 5/5, DF 5/5, PF 5/5     Psychiatric - Mood stable, Affect WNL  Skin on admission: left surgical clean incision intact staples removed 1/8; right lateral hip abrasion 5cm x 2cm likely secondary to fall at home    VITALS  82y  Vital Signs Last 24 Hrs  T(C): 36.3 (19 Jan 2023 08:18), Max: 36.9 (19 Jan 2023 05:27)  T(F): 97.4 (19 Jan 2023 08:18), Max: 98.4 (19 Jan 2023 05:27)  HR: 73 (19 Jan 2023 08:18) (68 - 73)  BP: 100/63 (19 Jan 2023 08:18) (100/63 - 125/72)  BP(mean): --  RR: 15 (19 Jan 2023 08:18) (15 - 15)  SpO2: 94% (19 Jan 2023 08:18) (94% - 97%)    Parameters below as of 19 Jan 2023 08:18  Patient On (Oxygen Delivery Method): room air      Daily     Daily         RECENT LABS:                      CAPILLARY BLOOD GLUCOSE          MEDICATIONS  (STANDING):  amLODIPine   Tablet 2.5 milliGRAM(s) Oral daily  atorvastatin 20 milliGRAM(s) Oral at bedtime  enoxaparin Injectable 40 milliGRAM(s) SubCutaneous <User Schedule>  latanoprost 0.005% Ophthalmic Solution 1 Drop(s) Both EYES at bedtime  memantine 5 milliGRAM(s) Oral two times a day    MEDICATIONS  (PRN):  acetaminophen     Tablet .. 650 milliGRAM(s) Oral every 6 hours PRN Temp greater or equal to 38C (100.4F), Mild Pain (1 - 3)  polyethylene glycol 3350 17 Gram(s) Oral two times a day PRN Constipation

## 2023-01-19 NOTE — PROGRESS NOTE ADULT - ASSESSMENT
Assessment	  82y female with a history of HTN, HLD, glaucoma who presented to 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     Admitted to [place] acute inpatient rehab on 1/4/23 with cognitive deficits, and  ADL, gait, and functional impairments.     # Traumatic SDH  - Comprehensive Multidisciplinary Rehab Program: 3 hours a day, 5 days a week with PT/OT/SLP     P&O as needed  -s/p left Francis hole 12/29/22  --Seizure ppx:  brivaracetam completed- 7 days-on 1/5  -staples removed 1/8/23-healing well  -continue to hold ASA per neurosurg  -Repeat CT head 1/10-  Previously noted left-sided subdural hematoma is again identified. Decrease in size, mass effect and decreased left-to-right shift is seen.    Cognitive deficits--  -Trial of namenda 5mg bid-- no adverse effects, no GI upset, N/V    Neuroendocrine w/u-  --AM cortisol, TSH and free T4 wnl    #Rhabdomyolysis - Resolved  -elevated CK (now downtrending) and troponin  -EKG and TTE wnl  -Repeat CK level-- 79    #Glaucoma  -c/w home eyedrops    #HTN  -c/w with decreased home dose of amlodipine with parameters    #HLD  -c/w atorvastatin    # Sleep:  - Maintain low stim environment    # Pain Management:  - Tylenol PRN    # GI/Bowel:  - Senna QHS, Miralax daily     # /Bladder:   - Encourage timed voids every 4 hours while awake       # Skin/Pressure Injury:  - Skin assessment on admission:  right lateral hip abrasion 5cm x 2cm likely secondary to fall at home-- apply wound gel, cavilon to periwound and cover with allvyn dressing.   - Monitor Incisions: left surgical clean incision intact staples removed 1/8    # Diet:   - Diet Consistency/Modifications: Regular diet  - Aspiration Precautions  - c/w  SLP treatment    # DVT ppx:  - lovenox  - Last Doppler on 12/29 negative for DVT  -FU repeat Doppler for right calf pain    # Restrictions/Precautions:  - Precautions: falls, seizures, aspiration    IDT 1/17  Social work- Patient resides alone in an apartment with elevator access; independent prior,   ST- On Regular/thins, Ongoing barrier with cognition--mild to moderate deficits, Needs cues and reminders, impaired problem solving, will need assistance with meds and finances  OT- Dressing, toileting, transfers- Min A, UBD, eating, grooming- Supervision A.  PT- CS with transfers, ambulates 150 ft with RW, CS, 8 Steps 2 rails with min A, needs cueing   Goals- ambulate to bathroom with RW with supervision, appropriate call bell use, Improve problem solving and safety awareness   Dispo- 1/20 home --Daughter requesting AR extension as she is working with elder care  to establish trust to provide care for patient.  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     Admitted to [place] acute inpatient rehab on 1/4/23 with cognitive deficits, and  ADL, gait, and functional impairments.     # Traumatic SDH  - Comprehensive Multidisciplinary Rehab Program: 3 hours a day, 5 days a week with PT/OT/SLP     P&O as needed  -s/p left Francis hole 12/29/22  --Seizure ppx:  brivaracetam completed- 7 days-on 1/5  -staples removed 1/8/23-healing well  -continue to hold ASA per neurosurg  -Repeat CT head 1/10-  Previously noted left-sided subdural hematoma is again identified. Decrease in size, mass effect and decreased left-to-right shift is seen.    Cognitive deficits--  -Trial of namenda 5mg bid started 1/18-- no adverse effects, no GI upset, N/V    Neuroendocrine w/u-  --AM cortisol, TSH and free T4 wnl    #Rhabdomyolysis - Resolved  -elevated CK (now downtrending) and troponin  -EKG and TTE wnl  -Repeat CK level-- 79    #Glaucoma  -c/w home eyedrops    #HTN  -dc'd amlodipine 1/19 due to low BP will continue to monitor    #HLD  -c/w atorvastatin    # Sleep:  - Maintain low stim environment    # Pain Management:  - Tylenol PRN    # GI/Bowel:  - Senna QHS, Miralax daily     # /Bladder:   - Encourage timed voids every 4 hours while awake       # Skin/Pressure Injury:  - Skin assessment on admission:  right lateral hip abrasion 5cm x 2cm likely secondary to fall at home-- apply wound gel, cavilon to periwound and cover with allvyn dressing.   - Monitor Incisions: left surgical clean incision intact staples removed 1/8    # Diet:   - Diet Consistency/Modifications: Regular diet  - Aspiration Precautions  - c/w  SLP treatment    # DVT ppx:  - lovenox  - Last Doppler on 12/29 negative for DVT  -FU repeat Doppler for right calf pain    # Restrictions/Precautions:  - Precautions: falls, seizures, aspiration    IDT 1/17  Social work- Patient resides alone in an apartment with elevator access; independent prior,   ST- On Regular/thins, Ongoing barrier with cognition--mild to moderate deficits, Needs cues and reminders, impaired problem solving, will need assistance with meds and finances  OT- Dressing, toileting, transfers- Min A, UBD, eating, grooming- Supervision A.  PT- CS with transfers, ambulates 150 ft with RW, CS, 8 Steps 2 rails with min A, needs cueing   Goals- ambulate to bathroom with RW with supervision, appropriate call bell use, Improve problem solving and safety awareness   Dispo- 1/20 home --Daughter requesting AR extension as she is working with elder care  to establish trust to provide care for patient.  Does not want MIRIAM    stay covered till 1/22 with next review on 1/23

## 2023-01-20 PROCEDURE — 99232 SBSQ HOSP IP/OBS MODERATE 35: CPT

## 2023-01-20 RX ORDER — DONEPEZIL HYDROCHLORIDE 10 MG/1
5 TABLET, FILM COATED ORAL DAILY
Refills: 0 | Status: DISCONTINUED | OUTPATIENT
Start: 2023-01-20 | End: 2023-01-31

## 2023-01-20 RX ADMIN — MEMANTINE HYDROCHLORIDE 5 MILLIGRAM(S): 10 TABLET ORAL at 17:42

## 2023-01-20 RX ADMIN — MEMANTINE HYDROCHLORIDE 5 MILLIGRAM(S): 10 TABLET ORAL at 05:28

## 2023-01-20 RX ADMIN — ENOXAPARIN SODIUM 40 MILLIGRAM(S): 100 INJECTION SUBCUTANEOUS at 18:15

## 2023-01-20 RX ADMIN — ATORVASTATIN CALCIUM 20 MILLIGRAM(S): 80 TABLET, FILM COATED ORAL at 21:42

## 2023-01-20 RX ADMIN — LATANOPROST 1 DROP(S): 0.05 SOLUTION/ DROPS OPHTHALMIC; TOPICAL at 21:41

## 2023-01-20 RX ADMIN — DONEPEZIL HYDROCHLORIDE 5 MILLIGRAM(S): 10 TABLET, FILM COATED ORAL at 14:32

## 2023-01-20 NOTE — PROGRESS NOTE ADULT - SUBJECTIVE AND OBJECTIVE BOX
Patient is a 82y old  Female who presents with a chief complaint of Traumatic SDH (20 Jan 2023 08:36)      SUBJECTIVE / OVERNIGHT EVENTS:  Pt seen and examined at bedside. No acute events overnight.  Pt denies cp, palpitations, sob, abd pain, N/V, fever, chills.    ROS:  All other review of systems negative    Allergies    hydrochlorothiazide (Unknown)    Intolerances        MEDICATIONS  (STANDING):  atorvastatin 20 milliGRAM(s) Oral at bedtime  enoxaparin Injectable 40 milliGRAM(s) SubCutaneous <User Schedule>  latanoprost 0.005% Ophthalmic Solution 1 Drop(s) Both EYES at bedtime  memantine 5 milliGRAM(s) Oral two times a day    MEDICATIONS  (PRN):  acetaminophen     Tablet .. 650 milliGRAM(s) Oral every 6 hours PRN Temp greater or equal to 38C (100.4F), Mild Pain (1 - 3)  polyethylene glycol 3350 17 Gram(s) Oral two times a day PRN Constipation      Vital Signs Last 24 Hrs  T(C): 36.9 (19 Jan 2023 19:17), Max: 36.9 (19 Jan 2023 19:17)  T(F): 98.5 (19 Jan 2023 19:17), Max: 98.5 (19 Jan 2023 19:17)  HR: 75 (19 Jan 2023 19:17) (75 - 75)  BP: 133/69 (19 Jan 2023 19:17) (133/69 - 133/69)  BP(mean): --  RR: 16 (19 Jan 2023 19:17) (16 - 16)  SpO2: 98% (19 Jan 2023 19:17) (98% - 98%)    Parameters below as of 19 Jan 2023 19:17  Patient On (Oxygen Delivery Method): room air      CAPILLARY BLOOD GLUCOSE        I&O's Summary      PHYSICAL EXAM:  GENERAL: NAD, thin female  HEAD:  Atraumatic, Normocephalic  NECK: Supple, No JVD  CHEST/LUNG: Clear to auscultation bilaterally; No wheeze, nonlabored breathing  HEART: Regular rate and rhythm; No murmurs, rubs, or gallops  ABDOMEN: Soft, Nontender, Nondistended; Bowel sounds present  EXTREMITIES:  No clubbing, cyanosis, or edema  PSYCH: calm, appropriate mood    LABS:                    RADIOLOGY & ADDITIONAL TESTS:  Results Reviewed:   Imaging Personally Reviewed:  Electrocardiogram Personally Reviewed:    COORDINATION OF CARE:  Care Discussed with Consultants/Other Providers [Y/N]:  Prior or Outpatient Records Reviewed [Y/N]:

## 2023-01-20 NOTE — PROGRESS NOTE ADULT - SUBJECTIVE AND OBJECTIVE BOX
Patient is a 82y old  Female who presents with a chief complaint of Traumatic SDH (19 Jan 2023 09:08)      HPI:  82y female with a history of HTN, HLD, glaucoma who presented to [facility] on 12/28/22 after fall and found to have CTH with largely chronic L SDH (approx 2cm) w/ 8mm MLS.  Exam on admission was AOx2, PERRL, EOMI, no facial, slight R drift, LOWERY at least 4+/5.  On 12/29/22 patient underwent L francis hole for SDH evacuation. She was started on brivaracetam to complete a 7 day course (last dose to be 1/5 pm). Asprin continued to be held in the setting of SDH.  Hospital course notably for elevated CK and troponin  Cardiology consulted and work-up including EKG and TTE wnl.  CK downtrended throughout course and elevated CK and troponin likely secondary to acute rhabdomyolysis secondary to fall.  BP was monitored and amlodipine decreased to 2.5 mg (was 5 mg) for soft BP. Patient was evaluated by Physical Therapy and was recommended for Acute rehab to improve cognitive and physical function.   (04 Jan 2023 14:18)      SUBJECTIVE HISTORY:      REVIEW OF SYSTEMS:        PHYSICAL EXAM    VITALS  82y  Vital Signs Last 24 Hrs  T(C): 36.9 (19 Jan 2023 19:17), Max: 36.9 (19 Jan 2023 19:17)  T(F): 98.5 (19 Jan 2023 19:17), Max: 98.5 (19 Jan 2023 19:17)  HR: 75 (19 Jan 2023 19:17) (75 - 75)  BP: 133/69 (19 Jan 2023 19:17) (133/69 - 133/69)  BP(mean): --  RR: 16 (19 Jan 2023 19:17) (16 - 16)  SpO2: 98% (19 Jan 2023 19:17) (98% - 98%)    Parameters below as of 19 Jan 2023 19:17  Patient On (Oxygen Delivery Method): room air      Daily     Daily         RECENT LABS:                      CAPILLARY BLOOD GLUCOSE          MEDICATIONS  (STANDING):  atorvastatin 20 milliGRAM(s) Oral at bedtime  enoxaparin Injectable 40 milliGRAM(s) SubCutaneous <User Schedule>  latanoprost 0.005% Ophthalmic Solution 1 Drop(s) Both EYES at bedtime  memantine 5 milliGRAM(s) Oral two times a day    MEDICATIONS  (PRN):  acetaminophen     Tablet .. 650 milliGRAM(s) Oral every 6 hours PRN Temp greater or equal to 38C (100.4F), Mild Pain (1 - 3)  polyethylene glycol 3350 17 Gram(s) Oral two times a day PRN Constipation           Patient is a 82y old  Female who presents with a chief complaint of Traumatic SDH (19 Jan 2023 09:08)      HPI:  82y female with a history of HTN, HLD, glaucoma who presented to [facility] on 12/28/22 after fall and found to have CTH with largely chronic L SDH (approx 2cm) w/ 8mm MLS.  Exam on admission was AOx2, PERRL, EOMI, no facial, slight R drift, LOWERY at least 4+/5.  On 12/29/22 patient underwent L francis hole for SDH evacuation. She was started on brivaracetam to complete a 7 day course (last dose to be 1/5 pm). Asprin continued to be held in the setting of SDH.  Hospital course notably for elevated CK and troponin  Cardiology consulted and work-up including EKG and TTE wnl.  CK downtrended throughout course and elevated CK and troponin likely secondary to acute rhabdomyolysis secondary to fall.  BP was monitored and amlodipine decreased to 2.5 mg (was 5 mg) for soft BP. Patient was evaluated by Physical Therapy and was recommended for Acute rehab to improve cognitive and physical function.   (04 Jan 2023 14:18)      SUBJECTIVE HISTORY:  Patient seen and evaluated at bedside during AM rounds.  Patient reports sleeping well and denies pain.    REVIEW OF SYSTEMS:  Denies HA, abdominal pain, calf pain      PHYSICAL EXAM  Constitutional - NAD, Comfortable  HEENT -left cranial surgical incision-healing well  Chest - good chest expansion, good respiratory effort, CTAB   Cardio - warm and well perfused, RRR, no murmur  Abdomen -  Soft, NTND  Extremities - +trace peripheral edema BL   Neurologic Exam:                    Cognitive -             Orientation: Awake and Alert to self, year,  that she is in the hospital-- unsure of the name but able to say [place] with cues            Attention:  Impaired-- unable to recall 0/3 objects even with MC options; Able to repeat days of the week backwards     Speech - + word finding difficulties; Fluent, Comprehensible, No dysarthria, No aphasia, repetition intact     Motor -                      LEFT    UE - ShAB 5/5, EF 5/5, EE 5/5, WE 5/5,  WNL                    RIGHT UE - ShAB 5/5, EF 5/5, EE 5/5, WE 5/5,  WNL                    LEFT    LE - HF 3/5, KE 4/5, DF 5/5, PF 5/5                    RIGHT LE - HF 5/5, KE 5/5, DF 5/5, PF 5/5     Psychiatric - Mood stable, Affect WNL  Skin on admission: left surgical clean incision intact staples removed 1/8; right lateral hip abrasion 5cm x 2cm likely secondary to fall at home    VITALS  82y  Vital Signs Last 24 Hrs  T(C): 36.9 (19 Jan 2023 19:17), Max: 36.9 (19 Jan 2023 19:17)  T(F): 98.5 (19 Jan 2023 19:17), Max: 98.5 (19 Jan 2023 19:17)  HR: 75 (19 Jan 2023 19:17) (75 - 75)  BP: 133/69 (19 Jan 2023 19:17) (133/69 - 133/69)  BP(mean): --  RR: 16 (19 Jan 2023 19:17) (16 - 16)  SpO2: 98% (19 Jan 2023 19:17) (98% - 98%)    Parameters below as of 19 Jan 2023 19:17  Patient On (Oxygen Delivery Method): room air      Daily     Daily         RECENT LABS:                      CAPILLARY BLOOD GLUCOSE          MEDICATIONS  (STANDING):  atorvastatin 20 milliGRAM(s) Oral at bedtime  enoxaparin Injectable 40 milliGRAM(s) SubCutaneous <User Schedule>  latanoprost 0.005% Ophthalmic Solution 1 Drop(s) Both EYES at bedtime  memantine 5 milliGRAM(s) Oral two times a day    MEDICATIONS  (PRN):  acetaminophen     Tablet .. 650 milliGRAM(s) Oral every 6 hours PRN Temp greater or equal to 38C (100.4F), Mild Pain (1 - 3)  polyethylene glycol 3350 17 Gram(s) Oral two times a day PRN Constipation

## 2023-01-20 NOTE — PROGRESS NOTE ADULT - ATTENDING COMMENTS
Pt. seen with resident.  Agree with documentation above as per resident with amendments made as appropriate. Patient medically stable. Making progress towards rehab goals.     left SDH  Cognitive deficits--  -cont. namenda 5mg bid started 1/18-- no adverse effects, no GI upset, N/V  -start Donepezil 5mg QD --monitor for GI Sx

## 2023-01-20 NOTE — PROGRESS NOTE ADULT - ASSESSMENT
Assessment	  82y female with a history of HTN, HLD, glaucoma who presented to [facility] on 12/28/22 after fall and found to have chronic left SDH s/p L francis hole SDH evacuation.     Admitted to [facility] acute inpatient rehab on 1/4/23 with cognitive deficits, and  ADL, gait, and functional impairments.     # Traumatic SDH  - Comprehensive Multidisciplinary Rehab Program: 3 hours a day, 5 days a week with PT/OT/SLP     P&O as needed  -s/p left Francis hole 12/29/22  --Seizure ppx:  brivaracetam completed- 7 days-on 1/5  -staples removed 1/8/23-healing well  -continue to hold ASA per neurosurg  -Repeat CT head 1/10-  Previously noted left-sided subdural hematoma is again identified. Decrease in size, mass effect and decreased left-to-right shift is seen.    Cognitive deficits--  -Trial of namenda 5mg bid started 1/18-- no adverse effects, no GI upset, N/V    Neuroendocrine w/u-  --AM cortisol, TSH and free T4 wnl    #Rhabdomyolysis - Resolved  -elevated CK (now downtrending) and troponin  -EKG and TTE wnl  -Repeat CK level-- 79    #Glaucoma  -c/w home eyedrops    #HTN  -dc'd amlodipine 1/19 due to low BP will continue to monitor    #HLD  -c/w atorvastatin    # Sleep:  - Maintain low stim environment    # Pain Management:  - Tylenol PRN    # GI/Bowel:  - Senna QHS, Miralax daily     # /Bladder:   - Encourage timed voids every 4 hours while awake       # Skin/Pressure Injury:  - Skin assessment on admission:  right lateral hip abrasion 5cm x 2cm likely secondary to fall at home-- apply wound gel, cavilon to periwound and cover with allvyn dressing.   - Monitor Incisions: left surgical clean incision intact staples removed 1/8    # Diet:   - Diet Consistency/Modifications: Regular diet  - Aspiration Precautions  - c/w  SLP treatment    # DVT ppx:  - lovenox  - Last Doppler on 12/29 negative for DVT  -FU repeat Doppler for right calf pain    # Restrictions/Precautions:  - Precautions: falls, seizures, aspiration    IDT 1/17  Social work- Patient resides alone in an apartment with elevator access; independent prior,   ST- On Regular/thins, Ongoing barrier with cognition--mild to moderate deficits, Needs cues and reminders, impaired problem solving, will need assistance with meds and finances  OT- Dressing, toileting, transfers- Min A, UBD, eating, grooming- Supervision A.  PT- CS with transfers, ambulates 150 ft with RW, CS, 8 Steps 2 rails with min A, needs cueing   Goals- ambulate to bathroom with RW with supervision, appropriate call bell use, Improve problem solving and safety awareness   Dispo- 1/20 home --Daughter requesting AR extension as she is working with elder care  to establish trust to provide care for patient.  Does not want MIRIAM    stay covered till 1/22 with next review on 1/23     Assessment	  82y female with a history of HTN, HLD, glaucoma who presented to [facility] on 12/28/22 after fall and found to have chronic left SDH s/p L francis hole SDH evacuation.     Admitted to [facility] acute inpatient rehab on 1/4/23 with cognitive deficits, and  ADL, gait, and functional impairments.     # Traumatic SDH  - Comprehensive Multidisciplinary Rehab Program: 3 hours a day, 5 days a week with PT/OT/SLP     P&O as needed  -s/p left Francis hole 12/29/22  --Seizure ppx:  brivaracetam completed- 7 days-on 1/5  -staples removed 1/8/23-healing well  -continue to hold ASA per neurosurg  -Repeat CT head 1/10-  Previously noted left-sided subdural hematoma is again identified. Decrease in size, mass effect and decreased left-to-right shift is seen.    Cognitive deficits--  -Trial of namenda 5mg bid started 1/18-- no adverse effects, no GI upset, N/V  -start Donepezil 5mg QD --monitor for GI Sx    Neuroendocrine w/u-  --AM cortisol, TSH and free T4 wnl    #Rhabdomyolysis - Resolved  -elevated CK (now downtrending) and troponin  -EKG and TTE wnl  -Repeat CK level-- 79    #Glaucoma  -c/w home eyedrops    #HTN  -dc'd amlodipine 1/19 due to low BP will continue to monitor    #HLD  -c/w atorvastatin    # Sleep:  - Maintain low stim environment    # Pain Management:  - Tylenol PRN    # GI/Bowel:  - Senna QHS, Miralax daily     # /Bladder:   - Encourage timed voids every 4 hours while awake       # Skin/Pressure Injury:  - Skin assessment on admission:  right lateral hip abrasion 5cm x 2cm likely secondary to fall at home-- apply wound gel, cavilon to periwound and cover with allvyn dressing.   - Monitor Incisions: left surgical clean incision intact staples removed 1/8    # Diet:   - Diet Consistency/Modifications: Regular diet  - Aspiration Precautions  - c/w  SLP treatment    # DVT ppx:  - lovenox  - Last Doppler on 12/29 negative for DVT      # Restrictions/Precautions:  - Precautions: falls, seizures, aspiration    IDT 1/17  Social work- Patient resides alone in an apartment with elevator access; independent prior,   ST- On Regular/thins, Ongoing barrier with cognition--mild to moderate deficits, Needs cues and reminders, impaired problem solving, will need assistance with meds and finances  OT- Dressing, toileting, transfers- Min A, UBD, eating, grooming- Supervision A.  PT- CS with transfers, ambulates 150 ft with RW, CS, 8 Steps 2 rails with min A, needs cueing   Goals- ambulate to bathroom with RW with supervision, appropriate call bell use, Improve problem solving and safety awareness   Dispo- 1/20 home --Daughter requesting AR extension as she is working with elder care  to establish trust to provide care for patient.  Does not want MIRIAM    stay covered till 1/22 with next review on 1/23     Assessment	  82y female with a history of HTN, HLD, glaucoma who presented to [facility] on 12/28/22 after fall and found to have chronic left SDH s/p L francis hole SDH evacuation.     Admitted to [facility] acute inpatient rehab on 1/4/23 with cognitive deficits, and  ADL, gait, and functional impairments.     # Traumatic SDH  - Comprehensive Multidisciplinary Rehab Program: 3 hours a day, 5 days a week with PT/OT/SLP     P&O as needed  -s/p left Francis hole 12/29/22  --Seizure ppx:  brivaracetam completed- 7 days-on 1/5  -staples removed 1/8/23-healing well  -continue to hold ASA per neurosurg  -Repeat CT head 1/10-  Previously noted left-sided subdural hematoma is again identified. Decrease in size, mass effect and decreased left-to-right shift is seen.    Cognitive deficits--  -cont. namenda 5mg bid started 1/18-- no adverse effects, no GI upset, N/V  -start Donepezil 5mg QD --monitor for GI Sx    Neuroendocrine w/u-  --AM cortisol, TSH and free T4 wnl    #Rhabdomyolysis - Resolved  -elevated CK (now downtrending) and troponin  -EKG and TTE wnl  -Repeat CK level-- 79    #Glaucoma  -c/w home eyedrops    #HTN  -dc'd amlodipine 1/19 due to low BP will continue to monitor    #HLD  -c/w atorvastatin    # Sleep:  - Maintain low stim environment    # Pain Management:  - Tylenol PRN    # GI/Bowel:  - Senna QHS, Miralax daily     # /Bladder:   - Encourage timed voids every 4 hours while awake       # Skin/Pressure Injury:  - Skin assessment on admission:  right lateral hip abrasion 5cm x 2cm likely secondary to fall at home-- apply wound gel, cavilon to periwound and cover with allvyn dressing.   - Monitor Incisions: left surgical clean incision intact staples removed 1/8    # Diet:   - Diet Consistency/Modifications: Regular diet  - Aspiration Precautions  - c/w  SLP treatment    # DVT ppx:  - lovenox  - Last Doppler on 12/29 negative for DVT      # Restrictions/Precautions:  - Precautions: falls, seizures, aspiration    IDT 1/17  Social work- Patient resides alone in an apartment with elevator access; independent prior,   ST- On Regular/thins, Ongoing barrier with cognition--mild to moderate deficits, Needs cues and reminders, impaired problem solving, will need assistance with meds and finances  OT- Dressing, toileting, transfers- Min A, UBD, eating, grooming- Supervision A.  PT- CS with transfers, ambulates 150 ft with RW, CS, 8 Steps 2 rails with min A, needs cueing   Goals- ambulate to bathroom with RW with supervision, appropriate call bell use, Improve problem solving and safety awareness   Dispo- 1/20 home --Daughter requesting AR extension as she is working with elder care  to establish trust to provide care for patient.  Does not want MIRIAM    stay covered till 1/22 with next review on 1/23

## 2023-01-20 NOTE — PROGRESS NOTE ADULT - ASSESSMENT
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     #LE edema  -ULT on 12/29 negative for DVT  -Will c/w compression stockings  -Continue Lovenox DVT ppx    #Traumatic SDH  -s/p left [place] hole 12/29/22  -s/p brivaracetam  -continue to hold ASA per neurosurg    #Normocytic anemia  -Stable H/H  -Continue to monitor H/H    #Rhabdomyolysis   -Resolved    #HTN  -Continue amlodipine     # DVT ppx  -lovenox

## 2023-01-21 PROCEDURE — 99232 SBSQ HOSP IP/OBS MODERATE 35: CPT

## 2023-01-21 RX ADMIN — MEMANTINE HYDROCHLORIDE 5 MILLIGRAM(S): 10 TABLET ORAL at 18:06

## 2023-01-21 RX ADMIN — MEMANTINE HYDROCHLORIDE 5 MILLIGRAM(S): 10 TABLET ORAL at 05:30

## 2023-01-21 RX ADMIN — LATANOPROST 1 DROP(S): 0.05 SOLUTION/ DROPS OPHTHALMIC; TOPICAL at 22:17

## 2023-01-21 RX ADMIN — DONEPEZIL HYDROCHLORIDE 5 MILLIGRAM(S): 10 TABLET, FILM COATED ORAL at 12:43

## 2023-01-21 RX ADMIN — ENOXAPARIN SODIUM 40 MILLIGRAM(S): 100 INJECTION SUBCUTANEOUS at 18:06

## 2023-01-21 RX ADMIN — ATORVASTATIN CALCIUM 20 MILLIGRAM(S): 80 TABLET, FILM COATED ORAL at 22:17

## 2023-01-21 NOTE — PROGRESS NOTE ADULT - SUBJECTIVE AND OBJECTIVE BOX
Patient is a 82y old  Female who presents with a chief complaint of Traumatic SDH (20 Jan 2023 09:12)      SUBJECTIVE / OVERNIGHT EVENTS:  Pt seen and examined at bedside. No acute events overnight.  Pt denies cp, palpitations, sob, abd pain, N/V, fever, chills.    ROS:  All other review of systems negative    Allergies    hydrochlorothiazide (Unknown)    Intolerances        MEDICATIONS  (STANDING):  atorvastatin 20 milliGRAM(s) Oral at bedtime  donepezil 5 milliGRAM(s) Oral daily  enoxaparin Injectable 40 milliGRAM(s) SubCutaneous <User Schedule>  latanoprost 0.005% Ophthalmic Solution 1 Drop(s) Both EYES at bedtime  memantine 5 milliGRAM(s) Oral two times a day    MEDICATIONS  (PRN):  acetaminophen     Tablet .. 650 milliGRAM(s) Oral every 6 hours PRN Temp greater or equal to 38C (100.4F), Mild Pain (1 - 3)  polyethylene glycol 3350 17 Gram(s) Oral two times a day PRN Constipation      Vital Signs Last 24 Hrs  T(C): 36.7 (21 Jan 2023 08:46), Max: 36.9 (20 Jan 2023 19:48)  T(F): 98.1 (21 Jan 2023 08:46), Max: 98.4 (20 Jan 2023 19:48)  HR: 89 (21 Jan 2023 08:46) (63 - 89)  BP: 112/69 (21 Jan 2023 08:46) (100/61 - 112/69)  BP(mean): --  RR: 16 (21 Jan 2023 08:46) (16 - 16)  SpO2: 97% (21 Jan 2023 08:46) (97% - 100%)    Parameters below as of 21 Jan 2023 08:46  Patient On (Oxygen Delivery Method): room air      CAPILLARY BLOOD GLUCOSE        I&O's Summary      PHYSICAL EXAM:  GENERAL: NAD, thin female  HEAD:  Atraumatic, Normocephalic  NECK: Supple, No JVD  CHEST/LUNG: Clear to auscultation bilaterally; No wheeze, nonlabored breathing  HEART: Regular rate and rhythm; No murmurs, rubs, or gallops  ABDOMEN: Soft, Nontender, Nondistended; Bowel sounds present  EXTREMITIES:  No clubbing, cyanosis, or edema  PSYCH: calm, appropriate mood    LABS:                    RADIOLOGY & ADDITIONAL TESTS:  Results Reviewed:   Imaging Personally Reviewed:  Electrocardiogram Personally Reviewed:    COORDINATION OF CARE:  Care Discussed with Consultants/Other Providers [Y/N]:  Prior or Outpatient Records Reviewed [Y/N]:

## 2023-01-21 NOTE — PROGRESS NOTE ADULT - ASSESSMENT
Assessment	  82y female with a history of HTN, HLD, glaucoma who presented to [facility] on 12/28/22 after fall and found to have chronic left SDH s/p L francis hole SDH evacuation.     Admitted to [facility] acute inpatient rehab on 1/4/23 with cognitive deficits, and  ADL, gait, and functional impairments.     # Traumatic SDH  - Comprehensive Multidisciplinary Rehab Program: 3 hours a day, 5 days a week with PT/OT/SLP     P&O as needed  -s/p left Francis hole 12/29/22  --Seizure ppx:  brivaracetam completed- 7 days-on 1/5  -staples removed 1/8/23-healing well  -continue to hold ASA per neurosurg  -Repeat CT head 1/10-  Previously noted left-sided subdural hematoma is again identified. Decrease in size, mass effect and decreased left-to-right shift is seen.    Cognitive deficits--  -cont. namenda 5mg bid started 1/18-- no adverse effects, no GI upset, N/V  -started Donepezil 5mg QD 1/20/23 --monitor for GI Sx    Neuroendocrine w/u-  --AM cortisol, TSH and free T4 wnl        #Glaucoma  -c/w home eyedrops    #HTN  -dc'd amlodipine 1/19 due to low BP will continue to monitor  --BP controlled off meds    #HLD  -c/w atorvastatin    # Sleep:  - Maintain low stim environment    # Pain Management:  - Tylenol PRN    # GI/Bowel:  - Senna QHS, Miralax daily     # /Bladder:   - Encourage timed voids every 4 hours while awake       # Skin/Pressure Injury:  - Skin assessment on admission:  right lateral hip abrasion 5cm x 2cm likely secondary to fall at home-- apply wound gel, cavilon to periwound and cover with allvyn dressing.   - Monitor Incisions: left surgical clean incision intact staples removed 1/8    # Diet:   - Diet Consistency/Modifications: Regular diet  - Aspiration Precautions  - c/w  SLP treatment    # DVT ppx:  - lovenox  - Last Doppler on 12/29 negative for DVT      # Restrictions/Precautions:  - Precautions: falls, seizures, aspiration    IDT 1/17  Social work- Patient resides alone in an apartment with elevator access; independent prior,   ST- On Regular/thins, Ongoing barrier with cognition--mild to moderate deficits, Needs cues and reminders, impaired problem solving, will need assistance with meds and finances  OT- Dressing, toileting, transfers- Min A, UBD, eating, grooming- Supervision A.  PT- CS with transfers, ambulates 150 ft with RW, CS, 8 Steps 2 rails with min A, needs cueing   Goals- ambulate to bathroom with RW with supervision, appropriate call bell use, Improve problem solving and safety awareness   Dispo- stay covered till 1/22 with next review on 1/23 --Daughter requesting AR extension as she is working with elder care  to establish trust to provide care for patient.  Does not want MIRIAM

## 2023-01-21 NOTE — PROGRESS NOTE ADULT - ASSESSMENT
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     #LE edema  -ULT on 12/29 negative for DVT  -Will c/w compression stockings  -Continue Lovenox DVT ppx    #Traumatic SDH  -s/p left [place] hole 12/29/22  -s/p brivaracetam  -continue to hold ASA per neurosurg    #Normocytic anemia  -Stable H/H  -Continue to monitor H/H    #Rhabdomyolysis   -Resolved    #HTN  -Continue amlodipine     # DVT ppx  -lovenox

## 2023-01-21 NOTE — PROGRESS NOTE ADULT - SUBJECTIVE AND OBJECTIVE BOX
HPI:  82y female with a history of HTN, HLD, glaucoma who presented to [facility] on 12/28/22 after fall and found to have CTH with largely chronic L SDH (approx 2cm) w/ 8mm MLS.  Exam on admission was AOx2, PERRL, EOMI, no facial, slight R drift, LOWERY at least 4+/5.  On 12/29/22 patient underwent L francis hole for SDH evacuation. She was started on brivaracetam to complete a 7 day course (last dose to be 1/5 pm). Asprin continued to be held in the setting of SDH.  Hospital course notably for elevated CK and troponin  Cardiology consulted and work-up including EKG and TTE wnl.  CK downtrended throughout course and elevated CK and troponin likely secondary to acute rhabdomyolysis secondary to fall.  BP was monitored and amlodipine decreased to 2.5 mg (was 5 mg) for soft BP. Patient was evaluated by Physical Therapy and was recommended for Acute rehab to improve cognitive and physical function.   (04 Jan 2023 14:18)          Subjective:  no new issues.  slept during the night.  No GI sx.       VITALS  Vital Signs Last 24 Hrs  T(C): 36.7 (21 Jan 2023 08:46), Max: 36.9 (20 Jan 2023 19:48)  T(F): 98.1 (21 Jan 2023 08:46), Max: 98.4 (20 Jan 2023 19:48)  HR: 89 (21 Jan 2023 08:46) (63 - 89)  BP: 112/69 (21 Jan 2023 08:46) (100/61 - 112/69)  BP(mean): --  RR: 16 (21 Jan 2023 08:46) (16 - 16)  SpO2: 97% (21 Jan 2023 08:46) (97% - 100%)    Parameters below as of 21 Jan 2023 08:46  Patient On (Oxygen Delivery Method): room air        REVIEW OF SYMPTOMS  Neurological deficits--cognitive deficits,   Denies HA, abdominal pain, calf pain      PHYSICAL EXAM  Constitutional - NAD, Comfortable  HEENT -left cranial surgical incision-healing well  Chest - breathing comfortably on RA  Cardio - warm and well perfused, RRR, no murmur  Abdomen -  Soft, NTND  Neurologic Exam:                    Cognitive -             Orientation: Awake and Alert to self, year,  that she is in the hospital-- unsure of the name but able to say [place] with cues            Attention:  Impaired-- unable to recall 0/3 objects even with MC options; Able to repeat days of the week backwards     Speech - + word finding difficulties; Fluent, Comprehensible, No dysarthria, No aphasia, repetition intact     Motor -                      LEFT    UE - ShAB 5/5, EF 5/5, EE 5/5, WE 5/5,  WNL                    RIGHT UE - ShAB 5/5, EF 5/5, EE 5/5, WE 5/5,  WNL                    LEFT    LE - HF 3/5, KE 4/5, DF 5/5, PF 5/5                    RIGHT LE - HF 5/5, KE 5/5, DF 5/5, PF 5/5     Psychiatric - Mood stable, Affect WNL    RECENT LABS                  RADIOLOGY/OTHER RESULTS      MEDICATIONS  (STANDING):  atorvastatin 20 milliGRAM(s) Oral at bedtime  donepezil 5 milliGRAM(s) Oral daily  enoxaparin Injectable 40 milliGRAM(s) SubCutaneous <User Schedule>  latanoprost 0.005% Ophthalmic Solution 1 Drop(s) Both EYES at bedtime  memantine 5 milliGRAM(s) Oral two times a day    MEDICATIONS  (PRN):  acetaminophen     Tablet .. 650 milliGRAM(s) Oral every 6 hours PRN Temp greater or equal to 38C (100.4F), Mild Pain (1 - 3)  polyethylene glycol 3350 17 Gram(s) Oral two times a day PRN Constipation

## 2023-01-22 PROCEDURE — 99232 SBSQ HOSP IP/OBS MODERATE 35: CPT

## 2023-01-22 PROCEDURE — 99231 SBSQ HOSP IP/OBS SF/LOW 25: CPT

## 2023-01-22 RX ORDER — SENNA PLUS 8.6 MG/1
2 TABLET ORAL AT BEDTIME
Refills: 0 | Status: DISCONTINUED | OUTPATIENT
Start: 2023-01-22 | End: 2023-02-02

## 2023-01-22 RX ADMIN — POLYETHYLENE GLYCOL 3350 17 GRAM(S): 17 POWDER, FOR SOLUTION ORAL at 17:24

## 2023-01-22 RX ADMIN — MEMANTINE HYDROCHLORIDE 5 MILLIGRAM(S): 10 TABLET ORAL at 05:46

## 2023-01-22 RX ADMIN — DONEPEZIL HYDROCHLORIDE 5 MILLIGRAM(S): 10 TABLET, FILM COATED ORAL at 12:02

## 2023-01-22 RX ADMIN — LATANOPROST 1 DROP(S): 0.05 SOLUTION/ DROPS OPHTHALMIC; TOPICAL at 22:20

## 2023-01-22 RX ADMIN — ENOXAPARIN SODIUM 40 MILLIGRAM(S): 100 INJECTION SUBCUTANEOUS at 17:24

## 2023-01-22 RX ADMIN — ATORVASTATIN CALCIUM 20 MILLIGRAM(S): 80 TABLET, FILM COATED ORAL at 22:18

## 2023-01-22 RX ADMIN — SENNA PLUS 2 TABLET(S): 8.6 TABLET ORAL at 22:19

## 2023-01-22 RX ADMIN — MEMANTINE HYDROCHLORIDE 5 MILLIGRAM(S): 10 TABLET ORAL at 17:24

## 2023-01-22 NOTE — PROGRESS NOTE ADULT - ASSESSMENT
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     Admitted to [facility] acute inpatient rehab on 1/4/23 with cognitive deficits, and  ADL, gait, and functional impairments.     # Traumatic SDH  - Comprehensive Multidisciplinary Rehab Program: 3 hours a day, 5 days a week with PT/OT/SLP     P&O as needed  -s/p left Francis hole 12/29/22  --Seizure ppx:  brivaracetam completed- 7 days-on 1/5  -staples removed 1/8/23-healing well  -continue to hold ASA per neurosurg  -Repeat CT head 1/10-  Previously noted left-sided subdural hematoma is again identified. Decrease in size, mass effect and decreased left-to-right shift is seen.    Cognitive deficits--  -cont. namenda 5mg bid started 1/18-- no adverse effects, no GI upset, N/V  -started Donepezil 5mg QD 1/20/23 --monitor for GI Sx    Neuroendocrine w/u-  --AM cortisol, TSH and free T4 wnl        #Glaucoma  -c/w home eyedrops    #HTN  -dc'd amlodipine 1/19 due to low BP will continue to monitor  --BP controlled off meds    #HLD  -c/w atorvastatin    # Sleep:  - Maintain low stim environment    # Pain Management:  - Tylenol PRN    # GI/Bowel:  - Senna QHS, Miralax daily     # /Bladder:   - Encourage timed voids every 4 hours while awake       # Skin/Pressure Injury:  - Skin assessment on admission:  right lateral hip abrasion 5cm x 2cm likely secondary to fall at home-- apply wound gel, cavilon to periwound and cover with allvyn dressing.   - Monitor Incisions: left surgical clean incision intact staples removed 1/8    # Diet:   - Diet Consistency/Modifications: Regular diet  - Aspiration Precautions  - c/w  SLP treatment    # DVT ppx:  - lovenox  - Last Doppler on 12/29 negative for DVT      # Restrictions/Precautions:  - Precautions: falls, seizures, aspiration    IDT 1/17  Social work- Patient resides alone in an apartment with elevator access; independent prior,   ST- On Regular/thins, Ongoing barrier with cognition--mild to moderate deficits, Needs cues and reminders, impaired problem solving, will need assistance with meds and finances  OT- Dressing, toileting, transfers- Min A, UBD, eating, grooming- Supervision A.  PT- CS with transfers, ambulates 150 ft with RW, CS, 8 Steps 2 rails with min A, needs cueing   Goals- ambulate to bathroom with RW with supervision, appropriate call bell use, Improve problem solving and safety awareness   Dispo- stay covered till 1/22 with next review on 1/23 --Daughter requesting AR extension as she is working with elder care  to establish trust to provide care for patient.  Does not want MIRIAM

## 2023-01-22 NOTE — PROGRESS NOTE ADULT - SUBJECTIVE AND OBJECTIVE BOX
HPI:  82y female with a history of HTN, HLD, glaucoma who presented to [facility] on 12/28/22 after fall and found to have CTH with largely chronic L SDH (approx 2cm) w/ 8mm MLS.  Exam on admission was AOx2, PERRL, EOMI, no facial, slight R drift, LOWERY at least 4+/5.  On 12/29/22 patient underwent L francis hole for SDH evacuation. She was started on brivaracetam to complete a 7 day course (last dose to be 1/5 pm). Asprin continued to be held in the setting of SDH.  Hospital course notably for elevated CK and troponin  Cardiology consulted and work-up including EKG and TTE wnl.  CK downtrended throughout course and elevated CK and troponin likely secondary to acute rhabdomyolysis secondary to fall.  BP was monitored and amlodipine decreased to 2.5 mg (was 5 mg) for soft BP. Patient was evaluated by Physical Therapy and was recommended for Acute rehab to improve cognitive and physical function.   (04 Jan 2023 14:18)          Subjective:  slept during the night.  no new issues.       VITALS  Vital Signs Last 24 Hrs  T(C): 37 (21 Jan 2023 19:40), Max: 37 (21 Jan 2023 19:40)  T(F): 98.6 (21 Jan 2023 19:40), Max: 98.6 (21 Jan 2023 19:40)  HR: 66 (21 Jan 2023 19:40) (66 - 66)  BP: 105/60 (21 Jan 2023 19:40) (105/60 - 105/60)  BP(mean): --  RR: 16 (21 Jan 2023 19:40) (16 - 16)  SpO2: 99% (21 Jan 2023 19:40) (99% - 99%)    Parameters below as of 21 Jan 2023 19:40  Patient On (Oxygen Delivery Method): room air        REVIEW OF SYMPTOMS  Neurological deficits-cognitive deficits,   Denies HA, abdominal pain, calf pain      PHYSICAL EXAM  Constitutional - NAD, Comfortable  HEENT -left cranial surgical incision-healing well  Chest - breathing comfortably on RA  Cardio - warm and well perfused, RRR, no murmur  Abdomen -  Soft, NTND  Neurologic Exam:                    Cognitive -             Orientation: Awake and Alert to self, year,  that she is in the hospital-- unsure of the name but able to say [place] with cues            Attention:  Impaired-- unable to recall 0/3 objects even with MC options; Able to repeat days of the week backwards     Speech - + word finding difficulties; Fluent, Comprehensible, No dysarthria, No aphasia, repetition intact     Motor -                      LEFT    UE - ShAB 5/5, EF 5/5, EE 5/5, WE 5/5,  WNL                    RIGHT UE - ShAB 5/5, EF 5/5, EE 5/5, WE 5/5,  WNL                    LEFT    LE - HF 3/5, KE 4/5, DF 5/5, PF 5/5                    RIGHT LE - HF 5/5, KE 5/5, DF 5/5, PF 5/5     Psychiatric - Mood stable, Affect WNL      RECENT LABS                  RADIOLOGY/OTHER RESULTS      MEDICATIONS  (STANDING):  atorvastatin 20 milliGRAM(s) Oral at bedtime  donepezil 5 milliGRAM(s) Oral daily  enoxaparin Injectable 40 milliGRAM(s) SubCutaneous <User Schedule>  latanoprost 0.005% Ophthalmic Solution 1 Drop(s) Both EYES at bedtime  memantine 5 milliGRAM(s) Oral two times a day    MEDICATIONS  (PRN):  acetaminophen     Tablet .. 650 milliGRAM(s) Oral every 6 hours PRN Temp greater or equal to 38C (100.4F), Mild Pain (1 - 3)  polyethylene glycol 3350 17 Gram(s) Oral two times a day PRN Constipation

## 2023-01-22 NOTE — PROGRESS NOTE ADULT - ASSESSMENT
82y female with a history of HTN, HLD, glaucoma who presented to [facility] on 12/28/22 after fall and found to have chronic left SDH s/p L francis hole SDH evacuation.     #LE edema  -ULT on 12/29 negative for DVT  -Will c/w compression stockings  -Continue Lovenox DVT ppx    #Traumatic SDH  -s/p left Morton hole 12/29/22  -s/p brivaracetam  -continue to hold ASA per neurosurg    #Normocytic anemia  -Stable H/H  -Continue to monitor H/H    #Rhabdomyolysis   -Resolved    #HTN  -Continue amlodipine     # DVT ppx  -lovenox

## 2023-01-22 NOTE — PROGRESS NOTE ADULT - SUBJECTIVE AND OBJECTIVE BOX
Patient is a 82y old  Female who presents with a chief complaint of Traumatic SDH (21 Jan 2023 12:50)      SUBJECTIVE / OVERNIGHT EVENTS:  Pt seen and examined at bedside. No acute events overnight.  Pt denies cp, palpitations, sob, abd pain, N/V, fever, chills.    ROS:  All other review of systems negative    Allergies    hydrochlorothiazide (Unknown)    Intolerances        MEDICATIONS  (STANDING):  atorvastatin 20 milliGRAM(s) Oral at bedtime  donepezil 5 milliGRAM(s) Oral daily  enoxaparin Injectable 40 milliGRAM(s) SubCutaneous <User Schedule>  latanoprost 0.005% Ophthalmic Solution 1 Drop(s) Both EYES at bedtime  memantine 5 milliGRAM(s) Oral two times a day    MEDICATIONS  (PRN):  acetaminophen     Tablet .. 650 milliGRAM(s) Oral every 6 hours PRN Temp greater or equal to 38C (100.4F), Mild Pain (1 - 3)  polyethylene glycol 3350 17 Gram(s) Oral two times a day PRN Constipation      Vital Signs Last 24 Hrs  T(C): 37 (21 Jan 2023 19:40), Max: 37 (21 Jan 2023 19:40)  T(F): 98.6 (21 Jan 2023 19:40), Max: 98.6 (21 Jan 2023 19:40)  HR: 66 (21 Jan 2023 19:40) (66 - 66)  BP: 105/60 (21 Jan 2023 19:40) (105/60 - 105/60)  BP(mean): --  RR: 16 (21 Jan 2023 19:40) (16 - 16)  SpO2: 99% (21 Jan 2023 19:40) (99% - 99%)    Parameters below as of 21 Jan 2023 19:40  Patient On (Oxygen Delivery Method): room air      CAPILLARY BLOOD GLUCOSE        I&O's Summary      PHYSICAL EXAM:  GENERAL: NAD, thin female  HEAD:  Atraumatic, Normocephalic  NECK: Supple, No JVD  CHEST/LUNG: Clear to auscultation bilaterally; No wheeze, nonlabored breathing  HEART: Regular rate and rhythm; No murmurs, rubs, or gallops  ABDOMEN: Soft, Nontender, Nondistended; Bowel sounds present  EXTREMITIES:  No clubbing, cyanosis, or edema  PSYCH: calm, appropriate mood  SKIN: Right lateral hip healed wound with central skin still healing. Noninfectious, nontender    LABS:                    RADIOLOGY & ADDITIONAL TESTS:  Results Reviewed:   Imaging Personally Reviewed:  Electrocardiogram Personally Reviewed:    COORDINATION OF CARE:  Care Discussed with Consultants/Other Providers [Y/N]:  Prior or Outpatient Records Reviewed [Y/N]:

## 2023-01-23 LAB
ALBUMIN SERPL ELPH-MCNC: 2.9 G/DL — LOW (ref 3.3–5)
ALP SERPL-CCNC: 134 U/L — HIGH (ref 40–120)
ALT FLD-CCNC: 30 U/L — SIGNIFICANT CHANGE UP (ref 10–45)
ANION GAP SERPL CALC-SCNC: 4 MMOL/L — LOW (ref 5–17)
AST SERPL-CCNC: 27 U/L — SIGNIFICANT CHANGE UP (ref 10–40)
BILIRUB SERPL-MCNC: 0.3 MG/DL — SIGNIFICANT CHANGE UP (ref 0.2–1.2)
BUN SERPL-MCNC: 17 MG/DL — SIGNIFICANT CHANGE UP (ref 7–23)
CALCIUM SERPL-MCNC: 9.2 MG/DL — SIGNIFICANT CHANGE UP (ref 8.4–10.5)
CHLORIDE SERPL-SCNC: 107 MMOL/L — SIGNIFICANT CHANGE UP (ref 96–108)
CO2 SERPL-SCNC: 31 MMOL/L — SIGNIFICANT CHANGE UP (ref 22–31)
CREAT SERPL-MCNC: 0.64 MG/DL — SIGNIFICANT CHANGE UP (ref 0.5–1.3)
EGFR: 88 ML/MIN/1.73M2 — SIGNIFICANT CHANGE UP
GLUCOSE SERPL-MCNC: 98 MG/DL — SIGNIFICANT CHANGE UP (ref 70–99)
HCT VFR BLD CALC: 38.7 % — SIGNIFICANT CHANGE UP (ref 34.5–45)
HGB BLD-MCNC: 12.1 G/DL — SIGNIFICANT CHANGE UP (ref 11.5–15.5)
MCHC RBC-ENTMCNC: 26.9 PG — LOW (ref 27–34)
MCHC RBC-ENTMCNC: 31.3 GM/DL — LOW (ref 32–36)
MCV RBC AUTO: 86 FL — SIGNIFICANT CHANGE UP (ref 80–100)
NRBC # BLD: 0 /100 WBCS — SIGNIFICANT CHANGE UP (ref 0–0)
PLATELET # BLD AUTO: 269 K/UL — SIGNIFICANT CHANGE UP (ref 150–400)
POTASSIUM SERPL-MCNC: 3.9 MMOL/L — SIGNIFICANT CHANGE UP (ref 3.5–5.3)
POTASSIUM SERPL-SCNC: 3.9 MMOL/L — SIGNIFICANT CHANGE UP (ref 3.5–5.3)
PROT SERPL-MCNC: 7.2 G/DL — SIGNIFICANT CHANGE UP (ref 6–8.3)
RBC # BLD: 4.5 M/UL — SIGNIFICANT CHANGE UP (ref 3.8–5.2)
RBC # FLD: 15.4 % — HIGH (ref 10.3–14.5)
SODIUM SERPL-SCNC: 142 MMOL/L — SIGNIFICANT CHANGE UP (ref 135–145)
WBC # BLD: 6.72 K/UL — SIGNIFICANT CHANGE UP (ref 3.8–10.5)
WBC # FLD AUTO: 6.72 K/UL — SIGNIFICANT CHANGE UP (ref 3.8–10.5)

## 2023-01-23 PROCEDURE — 99232 SBSQ HOSP IP/OBS MODERATE 35: CPT

## 2023-01-23 RX ADMIN — ENOXAPARIN SODIUM 40 MILLIGRAM(S): 100 INJECTION SUBCUTANEOUS at 17:47

## 2023-01-23 RX ADMIN — MEMANTINE HYDROCHLORIDE 5 MILLIGRAM(S): 10 TABLET ORAL at 17:47

## 2023-01-23 RX ADMIN — MEMANTINE HYDROCHLORIDE 5 MILLIGRAM(S): 10 TABLET ORAL at 06:22

## 2023-01-23 RX ADMIN — LATANOPROST 1 DROP(S): 0.05 SOLUTION/ DROPS OPHTHALMIC; TOPICAL at 21:05

## 2023-01-23 RX ADMIN — ATORVASTATIN CALCIUM 20 MILLIGRAM(S): 80 TABLET, FILM COATED ORAL at 21:05

## 2023-01-23 RX ADMIN — DONEPEZIL HYDROCHLORIDE 5 MILLIGRAM(S): 10 TABLET, FILM COATED ORAL at 12:21

## 2023-01-23 NOTE — PROGRESS NOTE ADULT - SUBJECTIVE AND OBJECTIVE BOX
Patient is a 82y old  Female who presents with a chief complaint of Traumatic SDH (22 Jan 2023 16:15)      HPI:  82y female with a history of HTN, HLD, glaucoma who presented to [facility] on 12/28/22 after fall and found to have CTH with largely chronic L SDH (approx 2cm) w/ 8mm MLS.  Exam on admission was AOx2, PERRL, EOMI, no facial, slight R drift, LOWERY at least 4+/5.  On 12/29/22 patient underwent L francis hole for SDH evacuation. She was started on brivaracetam to complete a 7 day course (last dose to be 1/5 pm). Asprin continued to be held in the setting of SDH.  Hospital course notably for elevated CK and troponin  Cardiology consulted and work-up including EKG and TTE wnl.  CK downtrended throughout course and elevated CK and troponin likely secondary to acute rhabdomyolysis secondary to fall.  BP was monitored and amlodipine decreased to 2.5 mg (was 5 mg) for soft BP. Patient was evaluated by Physical Therapy and was recommended for Acute rehab to improve cognitive and physical function.   (04 Jan 2023 14:18)      SUBJECTIVE HISTORY:      REVIEW OF SYSTEMS:        PHYSICAL EXAM    VITALS  82y  Vital Signs Last 24 Hrs  T(C): 36.8 (22 Jan 2023 22:00), Max: 36.8 (22 Jan 2023 22:00)  T(F): 98.2 (22 Jan 2023 22:00), Max: 98.2 (22 Jan 2023 22:00)  HR: 58 (22 Jan 2023 22:00) (58 - 58)  BP: 117/68 (22 Jan 2023 22:00) (117/68 - 117/68)  BP(mean): --  RR: 16 (22 Jan 2023 22:00) (16 - 16)  SpO2: 99% (22 Jan 2023 22:00) (99% - 99%)    Parameters below as of 22 Jan 2023 22:00  Patient On (Oxygen Delivery Method): room air      Daily     Daily         RECENT LABS:                          12.1   6.72  )-----------( 269      ( 23 Jan 2023 06:15 )             38.7     01-23    142  |  107  |  17  ----------------------------<  98  3.9   |  31  |  0.64    Ca    9.2      23 Jan 2023 06:15    TPro  7.2  /  Alb  2.9<L>  /  TBili  0.3  /  DBili  x   /  AST  27  /  ALT  30  /  AlkPhos  134<H>  01-23    LIVER FUNCTIONS - ( 23 Jan 2023 06:15 )  Alb: 2.9 g/dL / Pro: 7.2 g/dL / ALK PHOS: 134 U/L / ALT: 30 U/L / AST: 27 U/L / GGT: x                   CAPILLARY BLOOD GLUCOSE          MEDICATIONS  (STANDING):  atorvastatin 20 milliGRAM(s) Oral at bedtime  donepezil 5 milliGRAM(s) Oral daily  enoxaparin Injectable 40 milliGRAM(s) SubCutaneous <User Schedule>  latanoprost 0.005% Ophthalmic Solution 1 Drop(s) Both EYES at bedtime  memantine 5 milliGRAM(s) Oral two times a day  senna 2 Tablet(s) Oral at bedtime    MEDICATIONS  (PRN):  acetaminophen     Tablet .. 650 milliGRAM(s) Oral every 6 hours PRN Temp greater or equal to 38C (100.4F), Mild Pain (1 - 3)  polyethylene glycol 3350 17 Gram(s) Oral two times a day PRN Constipation           Patient is a 82y old  Female who presents with a chief complaint of Traumatic SDH (22 Jan 2023 16:15)      HPI:  82y female with a history of HTN, HLD, glaucoma who presented to [facility] on 12/28/22 after fall and found to have CTH with largely chronic L SDH (approx 2cm) w/ 8mm MLS.  Exam on admission was AOx2, PERRL, EOMI, no facial, slight R drift, LOWERY at least 4+/5.  On 12/29/22 patient underwent L francis hole for SDH evacuation. She was started on brivaracetam to complete a 7 day course (last dose to be 1/5 pm). Asprin continued to be held in the setting of SDH.  Hospital course notably for elevated CK and troponin  Cardiology consulted and work-up including EKG and TTE wnl.  CK downtrended throughout course and elevated CK and troponin likely secondary to acute rhabdomyolysis secondary to fall.  BP was monitored and amlodipine decreased to 2.5 mg (was 5 mg) for soft BP. Patient was evaluated by Physical Therapy and was recommended for Acute rehab to improve cognitive and physical function.   (04 Jan 2023 14:18)      SUBJECTIVE HISTORY:  Patient seen and evaluated at bedside during AM rounds.  Patient reports sleeping well and denies pain.    REVIEW OF SYSTEMS:  Denies HA, abdominal pain, calf pain    PHYSICAL EXAM  Constitutional - NAD, Comfortable  HEENT -left cranial surgical incision-healing well  Chest - breathing comfortably on RA  Cardio - warm and well perfused, RRR, no murmur  Abdomen -  Soft, NTND  Neurologic Exam:                    Cognitive -             Orientation: Awake and Alert to self, year, date but not month, place            Attention:  Impaired-- unable to recall 0/3 objects even with MC options; Able to repeat days of the week backwards     Speech - + word finding difficulties; Fluent, Comprehensible, No dysarthria, No aphasia, repetition intact     Motor -                      LEFT    UE - ShAB 5/5, EF 5/5, EE 5/5, WE 5/5,  WNL                    RIGHT UE - ShAB 5/5, EF 5/5, EE 5/5, WE 5/5,  WNL                    LEFT    LE - HF 3/5, KE 4/5, DF 5/5, PF 5/5                    RIGHT LE - HF 5/5, KE 5/5, DF 5/5, PF 5/5     Psychiatric - Mood stable, Affect WNL    VITALS  82y  Vital Signs Last 24 Hrs  T(C): 36.8 (22 Jan 2023 22:00), Max: 36.8 (22 Jan 2023 22:00)  T(F): 98.2 (22 Jan 2023 22:00), Max: 98.2 (22 Jan 2023 22:00)  HR: 58 (22 Jan 2023 22:00) (58 - 58)  BP: 117/68 (22 Jan 2023 22:00) (117/68 - 117/68)  BP(mean): --  RR: 16 (22 Jan 2023 22:00) (16 - 16)  SpO2: 99% (22 Jan 2023 22:00) (99% - 99%)    Parameters below as of 22 Jan 2023 22:00  Patient On (Oxygen Delivery Method): room air      Daily     Daily         RECENT LABS:                          12.1   6.72  )-----------( 269      ( 23 Jan 2023 06:15 )             38.7     01-23    142  |  107  |  17  ----------------------------<  98  3.9   |  31  |  0.64    Ca    9.2      23 Jan 2023 06:15    TPro  7.2  /  Alb  2.9<L>  /  TBili  0.3  /  DBili  x   /  AST  27  /  ALT  30  /  AlkPhos  134<H>  01-23    LIVER FUNCTIONS - ( 23 Jan 2023 06:15 )  Alb: 2.9 g/dL / Pro: 7.2 g/dL / ALK PHOS: 134 U/L / ALT: 30 U/L / AST: 27 U/L / GGT: x                   CAPILLARY BLOOD GLUCOSE          MEDICATIONS  (STANDING):  atorvastatin 20 milliGRAM(s) Oral at bedtime  donepezil 5 milliGRAM(s) Oral daily  enoxaparin Injectable 40 milliGRAM(s) SubCutaneous <User Schedule>  latanoprost 0.005% Ophthalmic Solution 1 Drop(s) Both EYES at bedtime  memantine 5 milliGRAM(s) Oral two times a day  senna 2 Tablet(s) Oral at bedtime    MEDICATIONS  (PRN):  acetaminophen     Tablet .. 650 milliGRAM(s) Oral every 6 hours PRN Temp greater or equal to 38C (100.4F), Mild Pain (1 - 3)  polyethylene glycol 3350 17 Gram(s) Oral two times a day PRN Constipation           Patient is a 82y old  Female who presents with a chief complaint of Traumatic SDH (22 Jan 2023 16:15)      HPI:  82y female with a history of HTN, HLD, glaucoma who presented to [facility] on 12/28/22 after fall and found to have CTH with largely chronic L SDH (approx 2cm) w/ 8mm MLS.  Exam on admission was AOx2, PERRL, EOMI, no facial, slight R drift, LOWERY at least 4+/5.  On 12/29/22 patient underwent L francis hole for SDH evacuation. She was started on brivaracetam to complete a 7 day course (last dose to be 1/5 pm). Asprin continued to be held in the setting of SDH.  Hospital course notably for elevated CK and troponin  Cardiology consulted and work-up including EKG and TTE wnl.  CK downtrended throughout course and elevated CK and troponin likely secondary to acute rhabdomyolysis secondary to fall.  BP was monitored and amlodipine decreased to 2.5 mg (was 5 mg) for soft BP. Patient was evaluated by Physical Therapy and was recommended for Acute rehab to improve cognitive and physical function.   (04 Jan 2023 14:18)      SUBJECTIVE HISTORY:  Patient seen and evaluated at bedside during AM rounds.  Patient reports sleeping well and denies pain.  No overnight issues reported by nursing.      REVIEW OF SYSTEMS:  Denies HA, abdominal pain, calf pain    PHYSICAL EXAM  Constitutional - NAD, Comfortable  HEENT -left cranial surgical incision-healing well  Chest - breathing comfortably on RA  Cardio - warm and well perfused, RRR, no murmur  Abdomen -  Soft, NTND  Neurologic Exam:                    Cognitive -             Orientation: Awake and Alert to self, year, date but not month, place            Attention:  Impaired-- unable to recall 0/3 objects even with MC options; Able to repeat days of the week backwards     Speech - + word finding difficulties; Fluent, Comprehensible, No dysarthria, No aphasia, repetition intact     Motor -                      LEFT    UE - ShAB 5/5, EF 5/5, EE 5/5, WE 5/5,  WNL                    RIGHT UE - ShAB 5/5, EF 5/5, EE 5/5, WE 5/5,  WNL                    LEFT    LE - HF 3/5, KE 4/5, DF 5/5, PF 5/5                    RIGHT LE - HF 5/5, KE 5/5, DF 5/5, PF 5/5     Psychiatric - Mood stable, Affect WNL    VITALS  82y  Vital Signs Last 24 Hrs  T(C): 36.8 (22 Jan 2023 22:00), Max: 36.8 (22 Jan 2023 22:00)  T(F): 98.2 (22 Jan 2023 22:00), Max: 98.2 (22 Jan 2023 22:00)  HR: 58 (22 Jan 2023 22:00) (58 - 58)  BP: 117/68 (22 Jan 2023 22:00) (117/68 - 117/68)  BP(mean): --  RR: 16 (22 Jan 2023 22:00) (16 - 16)  SpO2: 99% (22 Jan 2023 22:00) (99% - 99%)    Parameters below as of 22 Jan 2023 22:00  Patient On (Oxygen Delivery Method): room air      Daily     Daily         RECENT LABS:                          12.1   6.72  )-----------( 269      ( 23 Jan 2023 06:15 )             38.7     01-23    142  |  107  |  17  ----------------------------<  98  3.9   |  31  |  0.64    Ca    9.2      23 Jan 2023 06:15    TPro  7.2  /  Alb  2.9<L>  /  TBili  0.3  /  DBili  x   /  AST  27  /  ALT  30  /  AlkPhos  134<H>  01-23    LIVER FUNCTIONS - ( 23 Jan 2023 06:15 )  Alb: 2.9 g/dL / Pro: 7.2 g/dL / ALK PHOS: 134 U/L / ALT: 30 U/L / AST: 27 U/L / GGT: x                   CAPILLARY BLOOD GLUCOSE          MEDICATIONS  (STANDING):  atorvastatin 20 milliGRAM(s) Oral at bedtime  donepezil 5 milliGRAM(s) Oral daily  enoxaparin Injectable 40 milliGRAM(s) SubCutaneous <User Schedule>  latanoprost 0.005% Ophthalmic Solution 1 Drop(s) Both EYES at bedtime  memantine 5 milliGRAM(s) Oral two times a day  senna 2 Tablet(s) Oral at bedtime    MEDICATIONS  (PRN):  acetaminophen     Tablet .. 650 milliGRAM(s) Oral every 6 hours PRN Temp greater or equal to 38C (100.4F), Mild Pain (1 - 3)  polyethylene glycol 3350 17 Gram(s) Oral two times a day PRN Constipation

## 2023-01-23 NOTE — PROGRESS NOTE ADULT - ATTENDING COMMENTS
Pt. seen with resident.  Agree with documentation above as per resident with amendments made as appropriate. Patient medically stable. Making progress towards rehab goals.     TBI--SDH--  -cont. Donepezil 5mg QD 1/20/23 --monitor for GI Sx--appears to be tolerating well    Labs--CBC, CMP reviewed-- stable    d/c planning to home 2/1 --d/w SW

## 2023-01-23 NOTE — PROGRESS NOTE ADULT - ASSESSMENT
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     Admitted to [facility] acute inpatient rehab on 1/4/23 with cognitive deficits, and  ADL, gait, and functional impairments.     # Traumatic SDH  - Comprehensive Multidisciplinary Rehab Program: 3 hours a day, 5 days a week with PT/OT/SLP     P&O as needed  -s/p left Francis hole 12/29/22  --Seizure ppx:  brivaracetam completed- 7 days-on 1/5  -staples removed 1/8/23-healing well  -continue to hold ASA per neurosurg  -Repeat CT head 1/10-  Previously noted left-sided subdural hematoma is again identified. Decrease in size, mass effect and decreased left-to-right shift is seen.    Cognitive deficits--  -cont. namenda 5mg bid started 1/18-- no adverse effects, no GI upset, N/V  -started Donepezil 5mg QD 1/20/23 --monitor for GI Sx    Neuroendocrine w/u-  --AM cortisol, TSH and free T4 wnl        #Glaucoma  -c/w home eyedrops    #HTN  -dc'd amlodipine 1/19 due to low BP will continue to monitor  --BP controlled off meds    #HLD  -c/w atorvastatin    # Sleep:  - Maintain low stim environment    # Pain Management:  - Tylenol PRN    # GI/Bowel:  - Senna QHS, Miralax daily     # /Bladder:   - Encourage timed voids every 4 hours while awake       # Skin/Pressure Injury:  - Skin assessment on admission:  right lateral hip abrasion 5cm x 2cm likely secondary to fall at home-- apply wound gel, cavilon to periwound and cover with allvyn dressing.   - Monitor Incisions: left surgical clean incision intact staples removed 1/8    # Diet:   - Diet Consistency/Modifications: Regular diet  - Aspiration Precautions  - c/w  SLP treatment    # DVT ppx:  - lovenox  - Last Doppler on 12/29 negative for DVT      # Restrictions/Precautions:  - Precautions: falls, seizures, aspiration    IDT 1/17  Social work- Patient resides alone in an apartment with elevator access; independent prior,   ST- On Regular/thins, Ongoing barrier with cognition--mild to moderate deficits, Needs cues and reminders, impaired problem solving, will need assistance with meds and finances  OT- Dressing, toileting, transfers- Min A, UBD, eating, grooming- Supervision A.  PT- CS with transfers, ambulates 150 ft with RW, CS, 8 Steps 2 rails with min A, needs cueing   Goals- ambulate to bathroom with RW with supervision, appropriate call bell use, Improve problem solving and safety awareness   Dispo- stay covered till 1/22 with next review on 1/23 --Daughter requesting AR extension as she is working with elder care  to establish trust to provide care for patient.  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     Admitted to [facility] acute inpatient rehab on 1/4/23 with cognitive deficits, and  ADL, gait, and functional impairments.     # Traumatic SDH  - Comprehensive Multidisciplinary Rehab Program: 3 hours a day, 5 days a week with PT/OT/SLP     P&O as needed  -s/p left Francis hole 12/29/22  --Seizure ppx:  brivaracetam completed- 7 days-on 1/5  -staples removed 1/8/23-healing well  -continue to hold ASA per neurosurg  -Repeat CT head 1/10-  Previously noted left-sided subdural hematoma is again identified. Decrease in size, mass effect and decreased left-to-right shift is seen.    Cognitive deficits--  -cont. namenda 5mg bid started 1/18-- no adverse effects, no GI upset, N/V  -cont. Donepezil 5mg QD 1/20/23 --monitor for GI Sx    Neuroendocrine w/u-  --AM cortisol, TSH and free T4 wnl    #Glaucoma  -c/w home eyedrops    #HTN  -dc'd amlodipine 1/19 due to low BP will continue to monitor  --BP controlled off meds    #HLD  -c/w atorvastatin    # Sleep:  - Maintain low stim environment    # Pain Management:  - Tylenol PRN    # GI/Bowel:  - Senna QHS, Miralax daily     # /Bladder:   - Encourage timed voids every 4 hours while awake       # Skin/Pressure Injury:  - Skin assessment on admission:  right lateral hip abrasion 5cm x 2cm likely secondary to fall at home-- apply wound gel, cavilon to periwound and cover with allvyn dressing.   - Monitor Incisions: left surgical clean incision intact staples removed 1/8    # Diet:   - Diet Consistency/Modifications: Regular diet  - Aspiration Precautions  - c/w  SLP treatment    # DVT ppx:  - lovenox  - Last Doppler on 12/29 negative for DVT      # Restrictions/Precautions:  - Precautions: falls, seizures, aspiration    IDT 1/17  Social work- Patient resides alone in an apartment with elevator access; independent prior,   ST- On Regular/thins, Ongoing barrier with cognition--mild to moderate deficits, Needs cues and reminders, impaired problem solving, will need assistance with meds and finances  OT- Dressing, toileting, transfers- Min A, UBD, eating, grooming- Supervision A.  PT- CS with transfers, ambulates 150 ft with RW, CS, 8 Steps 2 rails with min A, needs cueing   Goals- ambulate to bathroom with RW with supervision, appropriate call bell use, Improve problem solving and safety awareness   Dispo- stay covered till 1/22 with next review on 1/23 --Daughter requesting AR extension as she is working with elder care  to establish trust to provide care for patient.  Does not want MIRIAM

## 2023-01-24 ENCOUNTER — TRANSCRIPTION ENCOUNTER (OUTPATIENT)
Age: 83
End: 2023-01-24

## 2023-01-24 PROCEDURE — 99232 SBSQ HOSP IP/OBS MODERATE 35: CPT

## 2023-01-24 RX ADMIN — ATORVASTATIN CALCIUM 20 MILLIGRAM(S): 80 TABLET, FILM COATED ORAL at 21:03

## 2023-01-24 RX ADMIN — SENNA PLUS 2 TABLET(S): 8.6 TABLET ORAL at 21:00

## 2023-01-24 RX ADMIN — ENOXAPARIN SODIUM 40 MILLIGRAM(S): 100 INJECTION SUBCUTANEOUS at 18:52

## 2023-01-24 RX ADMIN — MEMANTINE HYDROCHLORIDE 5 MILLIGRAM(S): 10 TABLET ORAL at 17:10

## 2023-01-24 RX ADMIN — DONEPEZIL HYDROCHLORIDE 5 MILLIGRAM(S): 10 TABLET, FILM COATED ORAL at 11:53

## 2023-01-24 RX ADMIN — LATANOPROST 1 DROP(S): 0.05 SOLUTION/ DROPS OPHTHALMIC; TOPICAL at 21:00

## 2023-01-24 RX ADMIN — MEMANTINE HYDROCHLORIDE 5 MILLIGRAM(S): 10 TABLET ORAL at 05:03

## 2023-01-24 NOTE — DISCHARGE NOTE PROVIDER - PROVIDER TOKENS
PROVIDER:[TOKEN:[[id number]]],PROVIDER:[TOKEN:[[id number]]] PROVIDER:[TOKEN:[[id number]]],PROVIDER:[TOKEN:[[id number]],FOLLOWUP:[1 month]]

## 2023-01-24 NOTE — PROGRESS NOTE ADULT - SUBJECTIVE AND OBJECTIVE BOX
Patient is a 82y old  Female who presents with a chief complaint of Traumatic SDH (23 Jan 2023 08:11)      Patient seen and examined at bedside.  No overnight events  No complaints this morning    ALLERGIES:  hydrochlorothiazide (Unknown)    MEDICATIONS  (STANDING):  atorvastatin 20 milliGRAM(s) Oral at bedtime  donepezil 5 milliGRAM(s) Oral daily  enoxaparin Injectable 40 milliGRAM(s) SubCutaneous <User Schedule>  latanoprost 0.005% Ophthalmic Solution 1 Drop(s) Both EYES at bedtime  memantine 5 milliGRAM(s) Oral two times a day  senna 2 Tablet(s) Oral at bedtime    MEDICATIONS  (PRN):  acetaminophen     Tablet .. 650 milliGRAM(s) Oral every 6 hours PRN Temp greater or equal to 38C (100.4F), Mild Pain (1 - 3)  polyethylene glycol 3350 17 Gram(s) Oral two times a day PRN Constipation    Vital Signs Last 24 Hrs  T(F): 98.1 (24 Jan 2023 07:41), Max: 98.7 (23 Jan 2023 20:17)  HR: 59 (24 Jan 2023 07:41) (59 - 62)  BP: 121/65 (24 Jan 2023 07:41) (106/61 - 121/65)  RR: 16 (24 Jan 2023 07:41) (12 - 16)  SpO2: 99% (24 Jan 2023 07:41) (98% - 100%)  I&O's Summary    23 Jan 2023 07:01  -  24 Jan 2023 07:00  --------------------------------------------------------  IN: 240 mL / OUT: 0 mL / NET: 240 mL      PHYSICAL EXAM:  GENERAL: NAD  HENT:  Atraumatic, Normocephalic; No tonsillar erythema, exudates, or enlargement; Moist mucous membranes;   EYES: EOMI, PERRLA, conjunctiva and sclera clear, no lid-lag  NECK: Supple, No JVD, Normal thyroid  CHEST/LUNG: Clear to percussion bilaterally; No rales, rhonchi, wheezing, or rubs; normal respiratory effort, no intercostal retractions  HEART: Regular rate and rhythm; No murmurs, rubs, or gallops; No pitting edema  ABDOMEN: Soft, Nontender, Nondistended; Bowel sounds present; No HSM  MUSCULOSKELETAL/EXTREMITIES:  2+ Peripheral Pulses, No clubbing or digital cyanosis  PSYCH: Appropriate affect, Alert & Awake    LABS:                        12.1   6.72  )-----------( 269      ( 23 Jan 2023 06:15 )             38.7       01-23    142  |  107  |  17  ----------------------------<  98  3.9   |  31  |  0.64    Ca    9.2      23 Jan 2023 06:15    TPro  7.2  /  Alb  2.9  /  TBili  0.3  /  DBili  x   /  AST  27  /  ALT  30  /  AlkPhos  134  01-23     12-29 Chol 157 mg/dL LDL -- HDL 69 mg/dL Trig 66 mg/dL    COVID-19 PCR: Kristi (01-04-23 @ 16:15)  COVID-19 PCR: Kristi (01-03-23 @ 10:19)      Care Discussed with Rehab Attending and Other Providers

## 2023-01-24 NOTE — PROGRESS NOTE ADULT - SUBJECTIVE AND OBJECTIVE BOX
Patient is a 82y old  Female who presents with a chief complaint of Traumatic SDH (24 Jan 2023 08:45)      HPI:  82y female with a history of HTN, HLD, glaucoma who presented to [facility] on 12/28/22 after fall and found to have CTH with largely chronic L SDH (approx 2cm) w/ 8mm MLS.  Exam on admission was AOx2, PERRL, EOMI, no facial, slight R drift, LOWERY at least 4+/5.  On 12/29/22 patient underwent L francis hole for SDH evacuation. She was started on brivaracetam to complete a 7 day course (last dose to be 1/5 pm). Asprin continued to be held in the setting of SDH.  Hospital course notably for elevated CK and troponin  Cardiology consulted and work-up including EKG and TTE wnl.  CK downtrended throughout course and elevated CK and troponin likely secondary to acute rhabdomyolysis secondary to fall.  BP was monitored and amlodipine decreased to 2.5 mg (was 5 mg) for soft BP. Patient was evaluated by Physical Therapy and was recommended for Acute rehab to improve cognitive and physical function.   (04 Jan 2023 14:18)      SUBJECTIVE HISTORY:  Patient seen in therapy today.  Patient doing stairs with 1 rail with min A.  Discussed with therapy decreasing PT time and increasing SLP.  Patient without complaints    REVIEW OF SYSTEMS:  Denies HA, abdominal pain, calf pain    PHYSICAL EXAM  Constitutional - NAD, Comfortable  HEENT -left cranial surgical incision-healing well  Chest - breathing comfortably on RA  Cardio - warm and well perfused, RRR, no murmur  Abdomen -  Soft, NTND  Neurologic Exam:                    Cognitive -             Orientation: Awake and Alert to self, year, date but not month, place            Attention:  Impaired-- unable to recall 0/3 objects even with MC options; Able to repeat days of the week backwards     Speech - + word finding difficulties; Fluent, Comprehensible, No dysarthria, No aphasia, repetition intact     Motor -                      LEFT    UE - ShAB 5/5, EF 5/5, EE 5/5, WE 5/5,  WNL                    RIGHT UE - ShAB 5/5, EF 5/5, EE 5/5, WE 5/5,  WNL                    LEFT    LE - HF 3/5, KE 4/5, DF 5/5, PF 5/5                    RIGHT LE - HF 5/5, KE 5/5, DF 5/5, PF 5/5     Psychiatric - Mood stable, Affect WNL      VITALS  82y  Vital Signs Last 24 Hrs  T(C): 36.7 (24 Jan 2023 07:41), Max: 37.1 (23 Jan 2023 20:17)  T(F): 98.1 (24 Jan 2023 07:41), Max: 98.7 (23 Jan 2023 20:17)  HR: 59 (24 Jan 2023 07:41) (59 - 62)  BP: 121/65 (24 Jan 2023 07:41) (106/61 - 121/65)  BP(mean): --  RR: 16 (24 Jan 2023 07:41) (14 - 16)  SpO2: 99% (24 Jan 2023 07:41) (99% - 100%)    Parameters below as of 24 Jan 2023 07:41  Patient On (Oxygen Delivery Method): room air      Daily     Daily         RECENT LABS:                          12.1   6.72  )-----------( 269      ( 23 Jan 2023 06:15 )             38.7     01-23    142  |  107  |  17  ----------------------------<  98  3.9   |  31  |  0.64    Ca    9.2      23 Jan 2023 06:15    TPro  7.2  /  Alb  2.9<L>  /  TBili  0.3  /  DBili  x   /  AST  27  /  ALT  30  /  AlkPhos  134<H>  01-23    LIVER FUNCTIONS - ( 23 Jan 2023 06:15 )  Alb: 2.9 g/dL / Pro: 7.2 g/dL / ALK PHOS: 134 U/L / ALT: 30 U/L / AST: 27 U/L / GGT: x                   CAPILLARY BLOOD GLUCOSE          MEDICATIONS  (STANDING):  atorvastatin 20 milliGRAM(s) Oral at bedtime  donepezil 5 milliGRAM(s) Oral daily  enoxaparin Injectable 40 milliGRAM(s) SubCutaneous <User Schedule>  latanoprost 0.005% Ophthalmic Solution 1 Drop(s) Both EYES at bedtime  memantine 5 milliGRAM(s) Oral two times a day  senna 2 Tablet(s) Oral at bedtime    MEDICATIONS  (PRN):  acetaminophen     Tablet .. 650 milliGRAM(s) Oral every 6 hours PRN Temp greater or equal to 38C (100.4F), Mild Pain (1 - 3)  polyethylene glycol 3350 17 Gram(s) Oral two times a day PRN Constipation           Patient is a 82y old  Female who presents with a chief complaint of Traumatic SDH (24 Jan 2023 08:45)      HPI:  82y female with a history of HTN, HLD, glaucoma who presented to [facility] on 12/28/22 after fall and found to have CTH with largely chronic L SDH (approx 2cm) w/ 8mm MLS.  Exam on admission was AOx2, PERRL, EOMI, no facial, slight R drift, LOWERY at least 4+/5.  On 12/29/22 patient underwent L francis hole for SDH evacuation. She was started on brivaracetam to complete a 7 day course (last dose to be 1/5 pm). Asprin continued to be held in the setting of SDH.  Hospital course notably for elevated CK and troponin  Cardiology consulted and work-up including EKG and TTE wnl.  CK downtrended throughout course and elevated CK and troponin likely secondary to acute rhabdomyolysis secondary to fall.  BP was monitored and amlodipine decreased to 2.5 mg (was 5 mg) for soft BP. Patient was evaluated by Physical Therapy and was recommended for Acute rehab to improve cognitive and physical function.   (04 Jan 2023 14:18)      SUBJECTIVE HISTORY:  Patient seen in therapy today.  Patient doing stairs with 1 rail with min A.  Discussed with therapy decreasing PT time and increasing SLP.  Patient without complaints. slept during the night as per nursing.  no pain. No N/V    REVIEW OF SYSTEMS:  Denies HA, abdominal pain, calf pain    PHYSICAL EXAM  Constitutional - NAD, Comfortable  HEENT -left cranial surgical incision-healing well  Chest - breathing comfortably on RA  Cardio - warm and well perfused, RRR, no murmur  Abdomen -  Soft, NTND  Neurologic Exam:                    Cognitive -             Orientation: Awake and Alert to self, year, date but not month, place            Attention:  Impaired-- unable to recall 0/3 objects even with MC options; Able to repeat days of the week backwards     Speech - + word finding difficulties; Fluent, Comprehensible, No dysarthria, No aphasia, repetition intact     Motor -                      LEFT    UE - ShAB 5/5, EF 5/5, EE 5/5, WE 5/5,  WNL                    RIGHT UE - ShAB 5/5, EF 5/5, EE 5/5, WE 5/5,  WNL                    LEFT    LE - HF 3/5, KE 4/5, DF 5/5, PF 5/5                    RIGHT LE - HF 5/5, KE 5/5, DF 5/5, PF 5/5     Psychiatric - Mood stable, Affect WNL      VITALS  82y  Vital Signs Last 24 Hrs  T(C): 36.7 (24 Jan 2023 07:41), Max: 37.1 (23 Jan 2023 20:17)  T(F): 98.1 (24 Jan 2023 07:41), Max: 98.7 (23 Jan 2023 20:17)  HR: 59 (24 Jan 2023 07:41) (59 - 62)  BP: 121/65 (24 Jan 2023 07:41) (106/61 - 121/65)  BP(mean): --  RR: 16 (24 Jan 2023 07:41) (14 - 16)  SpO2: 99% (24 Jan 2023 07:41) (99% - 100%)    Parameters below as of 24 Jan 2023 07:41  Patient On (Oxygen Delivery Method): room air      Daily     Daily         RECENT LABS:                          12.1   6.72  )-----------( 269      ( 23 Jan 2023 06:15 )             38.7     01-23    142  |  107  |  17  ----------------------------<  98  3.9   |  31  |  0.64    Ca    9.2      23 Jan 2023 06:15    TPro  7.2  /  Alb  2.9<L>  /  TBili  0.3  /  DBili  x   /  AST  27  /  ALT  30  /  AlkPhos  134<H>  01-23    LIVER FUNCTIONS - ( 23 Jan 2023 06:15 )  Alb: 2.9 g/dL / Pro: 7.2 g/dL / ALK PHOS: 134 U/L / ALT: 30 U/L / AST: 27 U/L / GGT: x                   CAPILLARY BLOOD GLUCOSE          MEDICATIONS  (STANDING):  atorvastatin 20 milliGRAM(s) Oral at bedtime  donepezil 5 milliGRAM(s) Oral daily  enoxaparin Injectable 40 milliGRAM(s) SubCutaneous <User Schedule>  latanoprost 0.005% Ophthalmic Solution 1 Drop(s) Both EYES at bedtime  memantine 5 milliGRAM(s) Oral two times a day  senna 2 Tablet(s) Oral at bedtime    MEDICATIONS  (PRN):  acetaminophen     Tablet .. 650 milliGRAM(s) Oral every 6 hours PRN Temp greater or equal to 38C (100.4F), Mild Pain (1 - 3)  polyethylene glycol 3350 17 Gram(s) Oral two times a day PRN Constipation

## 2023-01-24 NOTE — PROGRESS NOTE ADULT - ASSESSMENT
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     #LE edema  -ULT on 12/29 negative for DVT  -c/w compression stockings  -Continue Lovenox DVT ppx    #Traumatic SDH  -s/p left Francis hole 12/29/22  -s/p brivaracetam  -continue to hold ASA per neurosurg  -continue donepezil and memantine  -rehab per primary team    #HLD  -continue atorvastatin    #Normocytic anemia  -Stable H/H  -Continue to monitor H/H    #Rhabdomyolysis   -Resolved    #HTN  -Hold amlodipine since BP appropriate    #Glaucoma  -continue latanoprost     # DVT ppx  -lovenox

## 2023-01-24 NOTE — DISCHARGE NOTE PROVIDER - NSDCCPCAREPLAN_GEN_ALL_CORE_FT
PRINCIPAL DISCHARGE DIAGNOSIS  Diagnosis: SDH (subdural hematoma)  Assessment and Plan of Treatment: You admitted to [facility] for acute rehab following your subdural hematoma s/p francis hole.  You completed a course of acute rehab and will continue therapies as outpatient.  Please continue on donepezil and memantine for cognition.  Please follow up with neurosurgery, Dr. López and PM&R, Dr. Traore      SECONDARY DISCHARGE DIAGNOSES  Diagnosis: HTN (hypertension)  Assessment and Plan of Treatment: Your blood pressure was monitored in acute rehab and your amlodipine was discontinue.  Please follow-up with primary care physician for continued blood pressure management

## 2023-01-24 NOTE — DISCHARGE NOTE PROVIDER - CARE PROVIDERS DIRECT ADDRESSES
,[email or web address]

## 2023-01-24 NOTE — DISCHARGE NOTE PROVIDER - NSDCMRMEDTOKEN_GEN_ALL_CORE_FT
acetaminophen 325 mg oral tablet: 2 tab(s) orally every 6 hours, As needed,  Mild Pain (1 - 3)  amLODIPine 2.5 mg oral tablet: 1 tab(s) orally once a day  atorvastatin 20 mg oral tablet: 1 tab(s) orally once a day (at bedtime)  brivaracetam 50 mg oral tablet: 1 tab(s) orally 2 times a day (last dose 1/5 pm)  enoxaparin: 40 milligram(s) subcutaneous once a day (at bedtime)  polyethylene glycol 3350 oral powder for reconstitution: 17 gram(s) orally 2 times a day  Restasis 0.05% ophthalmic emulsion: 1 drop(s) to each affected eye every 12 hours  senna leaf extract oral tablet: 2 tab(s) orally once a day (at bedtime)  travoprost 0.004% ophthalmic solution: 1 drop(s) to each affected eye once a day (in the evening)   acetaminophen 325 mg oral tablet: 2 tab(s) orally every 6 hours, As needed, Temp greater or equal to 38C (100.4F), Mild Pain (1 - 3)  atorvastatin 20 mg oral tablet: 1 tab(s) orally once a day (at bedtime)  donepezil 10 mg oral tablet: 1 tab(s) orally once a day  latanoprost 0.005% ophthalmic solution: 1 drop(s) to each affected eye once a day (at bedtime)  memantine 5 mg oral tablet: 1 tab(s) orally 2 times a day  polyethylene glycol 3350 oral powder for reconstitution: 17 gram(s) orally 2 times a day, As needed, Constipation  Restasis 0.05% ophthalmic emulsion: 1 drop(s) to each affected eye every 12 hours  senna leaf extract oral tablet: 2 tab(s) orally once a day (at bedtime)  travoprost 0.004% ophthalmic solution: 1 drop(s) to each affected eye once a day (in the evening)   acetaminophen 325 mg oral tablet: 2 tab(s) orally every 6 hours, As needed, Temp greater or equal to 38C (100.4F), Mild Pain (1 - 3)  atorvastatin 20 mg oral tablet: 1 tab(s) orally once a day (at bedtime)  donepezil 10 mg oral tablet: 1 tab(s) orally once a day  latanoprost 0.005% ophthalmic solution: 1 drop(s) to each affected eye once a day (at bedtime)  memantine 5 mg oral tablet: 1 tab(s) orally 2 times a day  Occupational Therapy: Occupational Therapy BIW x 8 weeks  Dx: left SDH s/p [place] Hole evacuation  Eval and treat, ADLs, Coordination, IADLs, Problem Solving skills, Safety awareness. HEP  Physical Therapy: Physical Therapy BIW x 6-8 weeks  Dx: left SDH s/p [place] Hole evacuation  Eval and treat, Balance, Community Ambulation, Stairs, Endurance, Safety awareness. HEP  polyethylene glycol 3350 oral powder for reconstitution: 17 gram(s) orally 2 times a day, As needed, Constipation  Restasis 0.05% ophthalmic emulsion: 1 drop(s) to each affected eye every 12 hours  senna leaf extract oral tablet: 2 tab(s) orally once a day (at bedtime)  Speech Therapy: Speech Therapy BIW x 8 weeks  Dx: left SDH s/p [place] evacuation  Eval and treat, Cognitive linguistic Skills. HEP  travoprost 0.004% ophthalmic solution: 1 drop(s) to each affected eye once a day (in the evening)

## 2023-01-24 NOTE — DISCHARGE NOTE PROVIDER - HOSPITAL COURSE
REHAB COURSE:  Patient was stable upon rehab admission to  Inpatient Rehabilitation Facility. Admitted with gait instability, ADL, and functional impairments.     Rehab Course was significant for ____     Patient tolerated course of inpatient PT/OT/SLP rehab with significant functional improvements and met rehab goals prior to discharge. Patient was medically stable and optimized for discharge ____ home with follow-up with ______. 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     Admitted to [place] acute inpatient rehab on 1/4/23 with cognitive deficits, and  ADL, gait, and functional impairments.     REHAB COURSE:  Patient was stable upon rehab admission to  Inpatient Rehabilitation Facility. Admitted with gait instability, ADL, and functional impairments.     Rehab Course was significant for ____     Patient tolerated course of inpatient PT/OT/SLP rehab with significant functional improvements and met rehab goals prior to discharge. Patient was medically stable and optimized for discharge ____ home with follow-up with ______. 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   Admitted to [place] acute inpatient rehab on 1/4/23 with cognitive deficits, and  ADL, gait, and functional impairments.     REHAB COURSE:  Patient was stable upon rehab admission to  Inpatient Rehabilitation Facility. Admitted with gait instability, ADL, and functional impairments.     During rehab course blood pressure was monitored and medications optimized.  Patient was started on donepezil and memantine for cognition improvement and is without side effects.     Patient tolerated course of inpatient PT/OT/SLP rehab with significant functional improvements and met rehab goals prior to discharge.  Latest therapy progress can be seen below. Patient was medically stable and optimized for discharge home with follow-up with neurosurgery.    IDT 1/31  Nursing- Continent bowel and bladder, supervision assist   Social work- Patient resides alone in an apartment with elevator access; independent prior,   ST- On Regular/thins; mild language deficits., mild-moderate cognitive deficits--working memory and  short term memory impaired   OT- Supervision with ADL's, CG with shower transfers   PT- CS with transfers, ambulating more than 150 ft with RW-- CS, 12 steps NBQC with hand-held support and CG   Goals- 1. ambulate to bathroom with RW with supervision, 2. appropriate call bell use, 3. 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   Admitted to [place] acute inpatient rehab on 1/4/23 with cognitive deficits, and  ADL, gait, and functional impairments.     REHAB COURSE:  Patient was stable upon rehab admission to  Inpatient Rehabilitation Facility. Admitted with gait instability, ADL, and functional impairments.     During rehab course blood pressure was monitored and medications optimized.  Patient was started on donepezil and memantine for cognition improvement and is without side effects.     Patient tolerated course of inpatient PT/OT/SLP rehab with significant functional improvements and met rehab goals prior to discharge.  Latest therapy progress can be seen below. Patient was medically stable and optimized for discharge home with follow-up with neurosurgery.    IDT 1/31--discharge function  Nursing- Continent bowel and bladder, supervision assist   Social work- Patient resides alone in an apartment with elevator access; independent prior,   ST- On Regular/thins; mild language deficits., mild-moderate cognitive deficits--working memory and  short term memory impaired   OT- Supervision with ADL's, CG with shower transfers   PT- CS with transfers, ambulating more than 150 ft with RW-- CS, 12 steps NBQC with hand-held support and CG   Goals- 1. ambulate to bathroom with RW with supervision, 2. appropriate call bell use, 3. Improve problem solving and safety awareness

## 2023-01-24 NOTE — DISCHARGE NOTE PROVIDER - CARE PROVIDER_API CALL
Melva López)  Neurosurgery  [address], [address]  [place] [address]  Phone: [phone]  Fax: [phone]  Follow Up Time:     Leanne Traore ()  Brain Injury Medicine; PhysicalRehab Medicine  [address] [place] [address]  Phone: [phone]  Fax: [phone]  Follow Up Time:    Melva López (MD)  Neurosurgery  [address], [address]  [place] [address]  Phone: [phone]  Fax: [phone]  Follow Up Time:     Juan Truong (DO)  PhysicalRehab Medicine; Spinal Cord Injury Medicine  [facility], [address]th [place] [address]  Phone: [phone]  Fax: [phone]  Follow Up Time: 1 month

## 2023-01-24 NOTE — PROGRESS NOTE ADULT - ATTENDING COMMENTS
Pt. seen with resident.  Agree with documentation above as per resident with amendments made as appropriate. Patient medically stable. Making progress towards rehab goals.     left SDH s/p [place] hole  cont. namenda 5mg bid started 1/18-- no adverse effects, no GI upset, N/V  -cont. Donepezil 5mg QD 1/20/23 --monitor for GI Sx    d/w nursing and hospitalist    --discussed progress in therapy and rehab plan care with SW, speech, PT and OT in team meeting.  Making improvements-- close to supervision in PT-- decrease PT to 30min and increase speech to 90 min.

## 2023-01-24 NOTE — PROGRESS NOTE ADULT - ASSESSMENT
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     Admitted to [place] acute inpatient rehab on 1/4/23 with cognitive deficits, and  ADL, gait, and functional impairments.     # Traumatic SDH  - Comprehensive Multidisciplinary Rehab Program: 3 hours a day, 5 days a week with PT/OT/SLP     P&O as needed  -s/p left Francis hole 12/29/22  --Seizure ppx:  brivaracetam completed- 7 days-on 1/5  -staples removed 1/8/23-healing well  -continue to hold ASA per neurosurg  -Repeat CT head 1/10-  Previously noted left-sided subdural hematoma is again identified. Decrease in size, mass effect and decreased left-to-right shift is seen.    Cognitive deficits--  -cont. namenda 5mg bid started 1/18-- no adverse effects, no GI upset, N/V  -cont. Donepezil 5mg QD 1/20/23 --monitor for GI Sx    Neuroendocrine w/u-  --AM cortisol, TSH and free T4 wnl    #Glaucoma  -c/w home eyedrops    #HTN  -dc'd amlodipine 1/19 due to low BP will continue to monitor  --BP controlled off meds    #HLD  -c/w atorvastatin    # Sleep:  - Maintain low stim environment    # Pain Management:  - Tylenol PRN    # GI/Bowel:  - Senna QHS, Miralax daily     # /Bladder:   - Encourage timed voids every 4 hours while awake       # Skin/Pressure Injury:  - Skin assessment on admission:  right lateral hip abrasion 5cm x 2cm likely secondary to fall at home-- apply wound gel, cavilon to periwound and cover with allvyn dressing.   - Monitor Incisions: left surgical clean incision intact staples removed 1/8    # Diet:   - Diet Consistency/Modifications: Regular diet  - Aspiration Precautions  - c/w  SLP treatment    # DVT ppx:  - lovenox  - Last Doppler on 12/29 negative for DVT      # Restrictions/Precautions:  - Precautions: falls, seizures, aspiration    IDT 1/24  Social work- Patient resides alone in an apartment with elevator access; independent prior,   ST- On Regular/thins; gradual progress; word finding difficulties; decreasing deductive reasoning --> mild to mod cognitive deificts. 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     Admitted to [place] acute inpatient rehab on 1/4/23 with cognitive deficits, and  ADL, gait, and functional impairments.     # Traumatic SDH  - Comprehensive Multidisciplinary Rehab Program: 3 hours a day, 5 days a week with PT/OT/SLP     P&O as needed  -s/p left Francis hole 12/29/22  --Seizure ppx:  brivaracetam completed- 7 days-on 1/5  -staples removed 1/8/23-healing well  -continue to hold ASA per neurosurg  -Repeat CT head 1/10-  Previously noted left-sided subdural hematoma is again identified. Decrease in size, mass effect and decreased left-to-right shift is seen.    Cognitive deficits--  -cont. namenda 5mg bid started 1/18-- no adverse effects, no GI upset, N/V  -cont. Donepezil 5mg QD 1/20/23 --monitor for GI Sx    Neuroendocrine w/u-  --AM cortisol, TSH and free T4 wnl    #Glaucoma  -c/w home eyedrops    #HTN  -dc'd amlodipine 1/19 due to low BP will continue to monitor  --BP controlled off meds    #HLD  -c/w atorvastatin    # Sleep:  - Maintain low stim environment    # Pain Management:  - Tylenol PRN    # GI/Bowel:  - Senna QHS, Miralax daily     # /Bladder:   - Encourage timed voids every 4 hours while awake       # Skin/Pressure Injury:  - Skin assessment on admission:  right lateral hip abrasion 5cm x 2cm likely secondary to fall at home-- apply wound gel, cavilon to periwound and cover with allvyn dressing.   - Monitor Incisions: left surgical clean incision intact staples removed 1/8    # Diet:   - Diet Consistency/Modifications: Regular diet  - Aspiration Precautions  - c/w  SLP treatment    # DVT ppx:  - lovenox  - Last Doppler on 12/29 negative for DVT      # Restrictions/Precautions:  - Precautions: falls, seizures, aspiration    IDT 1/24  Social work- Patient resides alone in an apartment with elevator access; independent prior,   ST- On Regular/thins; gradual progress; Mild language deficits-- word finding difficulties; Mild--Moderate Cognitive deficits-- decreasing deductive reasoning, STM, working memory --> mild to mod cognitive deificts. Will need supervision for meds and finances  OT- Dressing, toileting, transfers- CS  , Bath/shower transfer CG  PT- CS with transfers, ambulates 150 ft with RW CS, 8 Steps 1 rails with min A  Goals- ambulate to bathroom with RW with supervision, appropriate call bell use, Improve problem solving and safety awareness   Dispo- covered through 1/30; Requested 2/1 for discharge home with 24hr aid

## 2023-01-24 NOTE — DISCHARGE NOTE PROVIDER - DETAILS OF MALNUTRITION DIAGNOSIS/DIAGNOSES
This patient has been assessed with a concern for Malnutrition and was treated during this hospitalization for the following Nutrition diagnosis/diagnoses:     -  01/05/2023: Moderate protein-calorie malnutrition

## 2023-01-25 PROCEDURE — 99232 SBSQ HOSP IP/OBS MODERATE 35: CPT

## 2023-01-25 RX ADMIN — SENNA PLUS 2 TABLET(S): 8.6 TABLET ORAL at 20:48

## 2023-01-25 RX ADMIN — ENOXAPARIN SODIUM 40 MILLIGRAM(S): 100 INJECTION SUBCUTANEOUS at 18:34

## 2023-01-25 RX ADMIN — DONEPEZIL HYDROCHLORIDE 5 MILLIGRAM(S): 10 TABLET, FILM COATED ORAL at 12:12

## 2023-01-25 RX ADMIN — MEMANTINE HYDROCHLORIDE 5 MILLIGRAM(S): 10 TABLET ORAL at 18:34

## 2023-01-25 RX ADMIN — ATORVASTATIN CALCIUM 20 MILLIGRAM(S): 80 TABLET, FILM COATED ORAL at 20:48

## 2023-01-25 RX ADMIN — LATANOPROST 1 DROP(S): 0.05 SOLUTION/ DROPS OPHTHALMIC; TOPICAL at 20:49

## 2023-01-25 RX ADMIN — MEMANTINE HYDROCHLORIDE 5 MILLIGRAM(S): 10 TABLET ORAL at 05:16

## 2023-01-25 NOTE — PROGRESS NOTE ADULT - SUBJECTIVE AND OBJECTIVE BOX
SUBJECTIVE/OBJECTIVE- Patient seen and examined. No acute overnight events, slept well. No current complaints.     REVIEW OF SYSTEMS  Denies Chest pain, SOB, Abdominal pain    VITALS  82y  Vital Signs Last 24 Hrs  T(C): 36.8 (24 Jan 2023 19:50), Max: 36.8 (24 Jan 2023 19:50)  T(F): 98.3 (24 Jan 2023 19:50), Max: 98.3 (24 Jan 2023 19:50)  HR: 60 (24 Jan 2023 19:50) (60 - 60)  BP: 120/65 (24 Jan 2023 19:50) (120/65 - 120/65)  RR: 14 (24 Jan 2023 19:50) (14 - 14)  SpO2: 98% (24 Jan 2023 19:50) (98% - 98%)    Parameters below as of 24 Jan 2023 19:50  Patient On (Oxygen Delivery Method): room air      PHYSICAL EXAM:   Constitutional - NAD, Comfortable  HEENT -left cranial surgical incision-healing well  Chest - breathing comfortably on RA  Cardio - warm and well perfused, RRR, no murmur  Abdomen -  Soft, NTND  Neurologic Exam:                    Cognitive -             Orientation: Awake and Alert to self, year, date but not month, place but not name of place            Attention:  Impaired-- unable to recall 0/3 objects even with MC options; Able to repeat days of the week backwards     Speech - + word finding difficulties; Fluent, Comprehensible, No dysarthria, No aphasia, repetition intact     Motor -                      LEFT    UE - ShAB 5/5, EF 5/5, EE 5/5, WE 5/5,  WNL                    RIGHT UE - ShAB 5/5, EF 5/5, EE 5/5, WE 5/5,  WNL                    LEFT    LE - HF 3/5, KE 4/5, DF 5/5, PF 5/5                    RIGHT LE - HF 5/5, KE 5/5, DF 5/5, PF 5/5     Psychiatric - Mood stable, Affect WNL      MEDICATIONS:  MEDICATIONS  (STANDING):  atorvastatin 20 milliGRAM(s) Oral at bedtime  donepezil 5 milliGRAM(s) Oral daily  enoxaparin Injectable 40 milliGRAM(s) SubCutaneous <User Schedule>  latanoprost 0.005% Ophthalmic Solution 1 Drop(s) Both EYES at bedtime  memantine 5 milliGRAM(s) Oral two times a day  senna 2 Tablet(s) Oral at bedtime    MEDICATIONS  (PRN):  acetaminophen     Tablet .. 650 milliGRAM(s) Oral every 6 hours PRN Temp greater or equal to 38C (100.4F), Mild Pain (1 - 3)  polyethylene glycol 3350 17 Gram(s) Oral two times a day PRN Constipation     SUBJECTIVE/OBJECTIVE- Patient seen and examined. No acute overnight events, slept well. Seen in PT-- has left 2nd toe pain when ambulating from callus    REVIEW OF SYSTEMS  Denies Chest pain, SOB, Abdominal pain    VITALS  82y  Vital Signs Last 24 Hrs  T(C): 36.8 (24 Jan 2023 19:50), Max: 36.8 (24 Jan 2023 19:50)  T(F): 98.3 (24 Jan 2023 19:50), Max: 98.3 (24 Jan 2023 19:50)  HR: 60 (24 Jan 2023 19:50) (60 - 60)  BP: 120/65 (24 Jan 2023 19:50) (120/65 - 120/65)  RR: 14 (24 Jan 2023 19:50) (14 - 14)  SpO2: 98% (24 Jan 2023 19:50) (98% - 98%)    Parameters below as of 24 Jan 2023 19:50  Patient On (Oxygen Delivery Method): room air      PHYSICAL EXAM:   Constitutional - NAD, Comfortable  HEENT -left cranial surgical incision-healing well  Chest - breathing comfortably on RA  Cardio - warm and well perfused, RRR, no murmur  Abdomen -  Soft, NTND   Extr-- Left foot 2nd digit hammer toe with callus at distal tip  Neurologic Exam:                    Cognitive -             Orientation: Awake and Alert to self, year, date but not month, place but not name of place            Attention:  Impaired-- unable to recall 0/3 objects even with MC options; Able to repeat days of the week backwards     Speech - + word finding difficulties; Fluent, Comprehensible, No dysarthria, No aphasia, repetition intact     Motor -                      LEFT    UE - ShAB 5/5, EF 5/5, EE 5/5, WE 5/5,  WNL                    RIGHT UE - ShAB 5/5, EF 5/5, EE 5/5, WE 5/5,  WNL                    LEFT    LE - HF 3/5, KE 4/5, DF 5/5, PF 5/5                    RIGHT LE - HF 5/5, KE 5/5, DF 5/5, PF 5/5     Psychiatric - Mood stable, Affect WNL      MEDICATIONS:  MEDICATIONS  (STANDING):  atorvastatin 20 milliGRAM(s) Oral at bedtime  donepezil 5 milliGRAM(s) Oral daily  enoxaparin Injectable 40 milliGRAM(s) SubCutaneous <User Schedule>  latanoprost 0.005% Ophthalmic Solution 1 Drop(s) Both EYES at bedtime  memantine 5 milliGRAM(s) Oral two times a day  senna 2 Tablet(s) Oral at bedtime    MEDICATIONS  (PRN):  acetaminophen     Tablet .. 650 milliGRAM(s) Oral every 6 hours PRN Temp greater or equal to 38C (100.4F), Mild Pain (1 - 3)  polyethylene glycol 3350 17 Gram(s) Oral two times a day PRN Constipation

## 2023-01-25 NOTE — PROGRESS NOTE ADULT - ATTENDING COMMENTS
Pt. seen this AM.  Agree with documentation above as per fellow with amendments made as appropriate. Patient medically stable. Making progress towards rehab goals.    left SDH  -Left foot 2nd digit hammer toe with callus at distal tip affecting ambulation-- seen in PT today--d/w PT--Podiatry consult for management    Subj. d/w nursing.

## 2023-01-25 NOTE — PROGRESS NOTE ADULT - ASSESSMENT
Assessment	  82y female with a history of HTN, HLD, glaucoma who presented to 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     Admitted to [facility] acute inpatient rehab on 1/4/23 with cognitive deficits, and  ADL, gait, and functional impairments.     # Traumatic SDH  - Comprehensive Multidisciplinary Rehab Program: 3 hours a day, 5 days a week with PT/OT/SLP     P&O as needed  -s/p left Francis hole 12/29/22  --Seizure ppx:  brivaracetam completed- 7 days-on 1/5  -staples removed 1/8/23-healing well  -continue to hold ASA per neurosurg  -Repeat CT head 1/10-  Previously noted left-sided subdural hematoma is again identified. Decrease in size, mass effect and decreased left-to-right shift is seen.    # Cognitive Deficits  -Cont. Namenda 5mg bid started 1/18-- no adverse effects, no GI upset, N/V  -Cont. Donepezil 5mg QD 1/20/23 --monitor for GI Sx-- none reported     Neuroendocrine w/u-  --AM cortisol, TSH and free T4 wnl    #Glaucoma  -c/w home eyedrops    #HTN  -dc'd amlodipine 1/19 due to low BP will continue to monitor  -BP controlled off meds    #HLD  -c/w atorvastatin    # Sleep:  - Maintain low stim environment    # Pain Management:  - Tylenol PRN    # GI/Bowel:  - Senna QHS, Miralax daily     # /Bladder:   - Encourage timed voids every 4 hours while awake     # Skin/Pressure Injury:  - Skin assessment on admission:  right lateral hip abrasion 5cm x 2cm likely secondary to fall at home-- apply wound gel, cavilon to periwound and cover with allvyn dressing.   - Monitor Incisions: left surgical clean incision intact staples removed 1/8    # Diet:   - Diet Consistency/Modifications: Regular diet  - Aspiration Precautions  - c/w  SLP treatment    # DVT ppx:  - lovenox  - Last Doppler on 12/29 negative for DVT      # Restrictions/Precautions:  - Precautions: falls, seizures, aspiration    IDT 1/24  Social work- Patient resides alone in an apartment with elevator access; independent prior,   ST- On Regular/thins; gradual progress; Mild language deficits-- word finding difficulties; Mild--Moderate Cognitive deficits-- decreasing deductive reasoning, STM, working memory --> mild to mod cognitive deificts. Will need supervision for meds and finances  OT- Dressing, toileting, transfers- CS  , Bath/shower transfer CG  PT- CS with transfers, ambulates 150 ft with RW CS, 8 Steps 1 rails with min A  Goals- ambulate to bathroom with RW with supervision, appropriate call bell use, Improve problem solving and safety awareness   Dispo- covered through 1/30; Requested 2/1 for discharge home with 24hr aid         Assessment	  82y female with a history of HTN, HLD, glaucoma who presented to 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     Admitted to [place] acute inpatient rehab on 1/4/23 with cognitive deficits, and  ADL, gait, and functional impairments.     # Traumatic SDH  - Comprehensive Multidisciplinary Rehab Program: 3 hours a day, 5 days a week with PT/OT/SLP     P&O as needed  -s/p left Francis hole 12/29/22  --Seizure ppx:  brivaracetam completed- 7 days-on 1/5  -staples removed 1/8/23-healing well  -continue to hold ASA per neurosurg  -Repeat CT head 1/10-  Previously noted left-sided subdural hematoma is again identified. Decrease in size, mass effect and decreased left-to-right shift is seen.    # Cognitive Deficits  -Cont. Namenda 5mg bid started 1/18-- no adverse effects, no GI upset, N/V  -Cont. Donepezil 5mg QD 1/20/23 --monitor for GI Sx-- none reported     Neuroendocrine w/u-  --AM cortisol, TSH and free T4 wnl    #Glaucoma  -c/w home eyedrops    #HTN  -dc'd amlodipine 1/19 due to low BP will continue to monitor  -BP controlled off meds    #HLD  -c/w atorvastatin    # Sleep:  - Maintain low stim environment    # Pain Management:  - Tylenol PRN  --Left foot 2nd digit hammer toe with callus at distal tip--Podiatry consult for management      # GI/Bowel:  - Senna QHS, Miralax daily     # /Bladder:   - Encourage timed voids every 4 hours while awake     # Skin/Pressure Injury:  - Skin assessment on admission:  right lateral hip abrasion 5cm x 2cm likely secondary to fall at home-- apply wound gel, cavilon to periwound and cover with allvyn dressing.   - Monitor Incisions: left surgical clean incision intact staples removed 1/8    # Diet:   - Diet Consistency/Modifications: Regular diet  - Aspiration Precautions  - c/w  SLP treatment    # DVT ppx:  - lovenox  - Last Doppler on 12/29 negative for DVT      # Restrictions/Precautions:  - Precautions: falls, seizures, aspiration    IDT 1/24  Social work- Patient resides alone in an apartment with elevator access; independent prior,   ST- On Regular/thins; gradual progress; Mild language deficits-- word finding difficulties; Mild--Moderate Cognitive deficits-- decreasing deductive reasoning, STM, working memory --> mild to mod cognitive deificts. Will need supervision for meds and finances  OT- Dressing, toileting, transfers- CS  , Bath/shower transfer CG  PT- CS with transfers, ambulates 150 ft with RW CS, 8 Steps 1 rails with min A  Goals- ambulate to bathroom with RW with supervision, appropriate call bell use, Improve problem solving and safety awareness   Dispo- covered through 1/30; Requested 2/1 for discharge home with 24hr aid

## 2023-01-26 LAB
ALBUMIN SERPL ELPH-MCNC: 2.8 G/DL — LOW (ref 3.3–5)
ALP SERPL-CCNC: 124 U/L — HIGH (ref 40–120)
ALT FLD-CCNC: 27 U/L — SIGNIFICANT CHANGE UP (ref 10–45)
ANION GAP SERPL CALC-SCNC: 9 MMOL/L — SIGNIFICANT CHANGE UP (ref 5–17)
AST SERPL-CCNC: 32 U/L — SIGNIFICANT CHANGE UP (ref 10–40)
BILIRUB SERPL-MCNC: 0.3 MG/DL — SIGNIFICANT CHANGE UP (ref 0.2–1.2)
BUN SERPL-MCNC: 20 MG/DL — SIGNIFICANT CHANGE UP (ref 7–23)
CALCIUM SERPL-MCNC: 9.3 MG/DL — SIGNIFICANT CHANGE UP (ref 8.4–10.5)
CHLORIDE SERPL-SCNC: 106 MMOL/L — SIGNIFICANT CHANGE UP (ref 96–108)
CO2 SERPL-SCNC: 29 MMOL/L — SIGNIFICANT CHANGE UP (ref 22–31)
CREAT SERPL-MCNC: 0.65 MG/DL — SIGNIFICANT CHANGE UP (ref 0.5–1.3)
EGFR: 88 ML/MIN/1.73M2 — SIGNIFICANT CHANGE UP
GLUCOSE SERPL-MCNC: 90 MG/DL — SIGNIFICANT CHANGE UP (ref 70–99)
HCT VFR BLD CALC: 37.6 % — SIGNIFICANT CHANGE UP (ref 34.5–45)
HGB BLD-MCNC: 11.7 G/DL — SIGNIFICANT CHANGE UP (ref 11.5–15.5)
MCHC RBC-ENTMCNC: 26.5 PG — LOW (ref 27–34)
MCHC RBC-ENTMCNC: 31.1 GM/DL — LOW (ref 32–36)
MCV RBC AUTO: 85.3 FL — SIGNIFICANT CHANGE UP (ref 80–100)
NRBC # BLD: 0 /100 WBCS — SIGNIFICANT CHANGE UP (ref 0–0)
PLATELET # BLD AUTO: 211 K/UL — SIGNIFICANT CHANGE UP (ref 150–400)
POTASSIUM SERPL-MCNC: 4.3 MMOL/L — SIGNIFICANT CHANGE UP (ref 3.5–5.3)
POTASSIUM SERPL-SCNC: 4.3 MMOL/L — SIGNIFICANT CHANGE UP (ref 3.5–5.3)
PROT SERPL-MCNC: 6.9 G/DL — SIGNIFICANT CHANGE UP (ref 6–8.3)
RBC # BLD: 4.41 M/UL — SIGNIFICANT CHANGE UP (ref 3.8–5.2)
RBC # FLD: 15.6 % — HIGH (ref 10.3–14.5)
SODIUM SERPL-SCNC: 144 MMOL/L — SIGNIFICANT CHANGE UP (ref 135–145)
WBC # BLD: 6.92 K/UL — SIGNIFICANT CHANGE UP (ref 3.8–10.5)
WBC # FLD AUTO: 6.92 K/UL — SIGNIFICANT CHANGE UP (ref 3.8–10.5)

## 2023-01-26 PROCEDURE — 99232 SBSQ HOSP IP/OBS MODERATE 35: CPT

## 2023-01-26 RX ADMIN — ATORVASTATIN CALCIUM 20 MILLIGRAM(S): 80 TABLET, FILM COATED ORAL at 21:06

## 2023-01-26 RX ADMIN — ENOXAPARIN SODIUM 40 MILLIGRAM(S): 100 INJECTION SUBCUTANEOUS at 18:40

## 2023-01-26 RX ADMIN — LATANOPROST 1 DROP(S): 0.05 SOLUTION/ DROPS OPHTHALMIC; TOPICAL at 21:06

## 2023-01-26 RX ADMIN — MEMANTINE HYDROCHLORIDE 5 MILLIGRAM(S): 10 TABLET ORAL at 18:40

## 2023-01-26 RX ADMIN — SENNA PLUS 2 TABLET(S): 8.6 TABLET ORAL at 21:06

## 2023-01-26 RX ADMIN — DONEPEZIL HYDROCHLORIDE 5 MILLIGRAM(S): 10 TABLET, FILM COATED ORAL at 11:41

## 2023-01-26 RX ADMIN — MEMANTINE HYDROCHLORIDE 5 MILLIGRAM(S): 10 TABLET ORAL at 05:02

## 2023-01-26 NOTE — PROGRESS NOTE ADULT - ASSESSMENT
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     Admitted to [facility] acute inpatient rehab on 1/4/23 with cognitive deficits, and  ADL, gait, and functional impairments.     # Traumatic SDH  - Comprehensive Multidisciplinary Rehab Program: 3 hours a day, 5 days a week with PT/OT/SLP     P&O as needed  -s/p left Francis hole 12/29/22  --Seizure ppx:  brivaracetam completed- 7 days-on 1/5  -staples removed 1/8/23-healing well  -continue to hold ASA per neurosurg  -Repeat CT head 1/10-  Previously noted left-sided subdural hematoma is again identified. Decrease in size, mass effect and decreased left-to-right shift is seen.  --Needs Supervision monitoring for impulsivity.     # Cognitive Deficits  -Cont. Namenda 5mg bid started 1/18-- no adverse effects, no GI upset, N/V  -Cont. Donepezil 5mg QD 1/20/23 --monitor for GI Sx-- none reported     Neuroendocrine w/u-  --AM cortisol, TSH and free T4 wnl    #Glaucoma  -c/w home eyedrops    #HTN  -dc'd amlodipine 1/19 due to low BP will continue to monitor  -BP controlled off meds    #HLD  -c/w atorvastatin    # Sleep:  - Maintain low stim environment    # Pain Management:  - Tylenol PRN  --Left foot 2nd digit hammer toe with callus at distal tip--Podiatry consult for management      # GI/Bowel:  - Senna QHS, Miralax daily     # /Bladder:   - Encourage timed voids every 4 hours while awake     # Skin/Pressure Injury:  - Skin assessment on admission:  right lateral hip abrasion 5cm x 2cm likely secondary to fall at home-- apply wound gel, cavilon to periwound and cover with allvyn dressing.   - Monitor Incisions: left surgical clean incision intact staples removed 1/8    # Diet:   - Diet Consistency/Modifications: Regular diet  - Aspiration Precautions  - c/w  SLP treatment    # DVT ppx:  - lovenox  - Last Doppler on 12/29 negative for DVT      # Restrictions/Precautions:  - Precautions: falls, seizures, aspiration    IDT 1/24  Social work- Patient resides alone in an apartment with elevator access; independent prior,   ST- On Regular/thins; gradual progress; Mild language deficits-- word finding difficulties; Mild--Moderate Cognitive deficits-- decreasing deductive reasoning, STM, working memory --> mild to mod cognitive deificts. Will need supervision for meds and finances  OT- Dressing, toileting, transfers- CS  , Bath/shower transfer CG  PT- CS with transfers, ambulates 150 ft with RW CS, 8 Steps 1 rails with min A  Goals- ambulate to bathroom with RW with supervision, appropriate call bell use, Improve problem solving and safety awareness   Dispo- covered through 1/30; Requested 2/1 for discharge home with 24hr aid

## 2023-01-26 NOTE — PROGRESS NOTE ADULT - SUBJECTIVE AND OBJECTIVE BOX
Patient is a 82y old  Female who presents with a chief complaint of Traumatic SDH (25 Jan 2023 13:28)      Patient seen and examined at bedside.  No overnight events  No complaints this morning    ALLERGIES:  hydrochlorothiazide (Unknown)    MEDICATIONS  (STANDING):  atorvastatin 20 milliGRAM(s) Oral at bedtime  donepezil 5 milliGRAM(s) Oral daily  enoxaparin Injectable 40 milliGRAM(s) SubCutaneous <User Schedule>  latanoprost 0.005% Ophthalmic Solution 1 Drop(s) Both EYES at bedtime  memantine 5 milliGRAM(s) Oral two times a day  senna 2 Tablet(s) Oral at bedtime    MEDICATIONS  (PRN):  acetaminophen     Tablet .. 650 milliGRAM(s) Oral every 6 hours PRN Temp greater or equal to 38C (100.4F), Mild Pain (1 - 3)  polyethylene glycol 3350 17 Gram(s) Oral two times a day PRN Constipation    Vital Signs Last 24 Hrs  T(F): 98.1 (26 Jan 2023 07:43), Max: 98.2 (25 Jan 2023 19:04)  HR: 72 (26 Jan 2023 07:43) (67 - 72)  BP: 129/60 (26 Jan 2023 07:43) (116/67 - 129/60)  RR: 16 (26 Jan 2023 07:43) (14 - 16)  SpO2: 96% (26 Jan 2023 07:43) (96% - 99%)  I&O's Summary    PHYSICAL EXAM:  GENERAL: NAD  HENT:  Atraumatic, Normocephalic; No tonsillar erythema, exudates, or enlargement; Moist mucous membranes;   EYES: EOMI, PERRLA, conjunctiva and sclera clear, no lid-lag  NECK: Supple, No JVD, Normal thyroid  CHEST/LUNG: Clear to percussion bilaterally; No rales, rhonchi, wheezing, or rubs; normal respiratory effort, no intercostal retractions  HEART: Regular rate and rhythm; No murmurs, rubs, or gallops; No pitting edema  ABDOMEN: Soft, Nontender, Nondistended; Bowel sounds present; No HSM  MUSCULOSKELETAL/EXTREMITIES:  2+ Peripheral Pulses, No clubbing or digital cyanosis  PSYCH: Appropriate affect, Alert & Awake    LABS:                        11.7   6.92  )-----------( 211      ( 26 Jan 2023 06:15 )             37.6       01-26    144  |  106  |  20  ----------------------------<  90  4.3   |  29  |  0.65    Ca    9.3      26 Jan 2023 06:15    TPro  6.9  /  Alb  2.8  /  TBili  0.3  /  DBili  x   /  AST  32  /  ALT  27  /  AlkPhos  124  01-26 12-29 Chol 157 mg/dL LDL -- HDL 69 mg/dL Trig 66 mg/dL    COVID-19 PCR: Kristi (01-04-23 @ 16:15)  COVID-19 PCR: Kristi (01-03-23 @ 10:19)    Care Discussed with Rehab Attending and Other Providers

## 2023-01-26 NOTE — PROGRESS NOTE ADULT - ASSESSMENT
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     #LE edema  -ULT on 12/29 negative for DVT  -c/w compression stockings  -Continue Lovenox DVT ppx    #Traumatic SDH  -s/p left Francis hole 12/29/22  -s/p brivaracetam  -continue to hold ASA per neurosurg  -continue donepezil and memantine  -rehab per primary team    #HLD  -continue atorvastatin    #Normocytic anemia  -Stable H/H  -Continue to monitor H/H    #Rhabdomyolysis   -Resolved    #HTN  -Hold amlodipine since BP appropriate    #Glaucoma  -continue latanoprost     # DVT ppx  -lovenox

## 2023-01-26 NOTE — PROGRESS NOTE ADULT - SUBJECTIVE AND OBJECTIVE BOX
HPI:  82y female with a history of HTN, HLD, glaucoma who presented to [facility] on 12/28/22 after fall and found to have CTH with largely chronic L SDH (approx 2cm) w/ 8mm MLS.  Exam on admission was AOx2, PERRL, EOMI, no facial, slight R drift, LOWERY at least 4+/5.  On 12/29/22 patient underwent L francis hole for SDH evacuation. She was started on brivaracetam to complete a 7 day course (last dose to be 1/5 pm). Asprin continued to be held in the setting of SDH.  Hospital course notably for elevated CK and troponin  Cardiology consulted and work-up including EKG and TTE wnl.  CK downtrended throughout course and elevated CK and troponin likely secondary to acute rhabdomyolysis secondary to fall.  BP was monitored and amlodipine decreased to 2.5 mg (was 5 mg) for soft BP. Patient was evaluated by Physical Therapy and was recommended for Acute rehab to improve cognitive and physical function.   (04 Jan 2023 14:18)          Subjective:  Slept during the night.  left foot 2nd toe pain intermittent.  Eating breakfast.  No nausea, vomiting, abdominal pain. No overnight issues reported by nursing except for impulsivity-- d/w nursing and therapy.       VITALS  Vital Signs Last 24 Hrs  T(C): 36.7 (26 Jan 2023 07:43), Max: 36.8 (25 Jan 2023 19:04)  T(F): 98.1 (26 Jan 2023 07:43), Max: 98.2 (25 Jan 2023 19:04)  HR: 72 (26 Jan 2023 07:43) (67 - 72)  BP: 129/60 (26 Jan 2023 07:43) (116/67 - 129/60)  BP(mean): --  RR: 16 (26 Jan 2023 07:43) (14 - 16)  SpO2: 96% (26 Jan 2023 07:43) (96% - 99%)    Parameters below as of 26 Jan 2023 07:43  Patient On (Oxygen Delivery Method): room air        REVIEW OF SYMPTOMS  Neurological deficits--cognitive deficits, impulsivity      PHYSICAL EXAM  Constitutional - NAD, Comfortable  HEENT -left cranial surgical incision-healing well  Chest - breathing comfortably on RA  Cardio - warm and well perfused, RRR, no murmur  Abdomen -  Soft, NTND   Extr-- Left foot 2nd digit hammer toe with callus at distal tip  Neurologic Exam:                    Cognitive -             Orientation: Awake and Alert to self, year, date but not month, place but not name of place            Attention:  Impaired-- unable to recall 0/3 objects even with MC options; Able to repeat days of the week backwards     Speech - + word finding difficulties; Fluent, Comprehensible, No dysarthria, No aphasia, repetition intact     Motor -                      LEFT    UE - ShAB 5/5, EF 5/5, EE 5/5, WE 5/5,  WNL                    RIGHT UE - ShAB 5/5, EF 5/5, EE 5/5, WE 5/5,  WNL                    LEFT    LE - HF 3/5, KE 4/5, DF 5/5, PF 5/5                    RIGHT LE - HF 5/5, KE 5/5, DF 5/5, PF 5/5     Psychiatric - Mood stable, Affect WNL    RECENT LABS                        11.7   6.92  )-----------( 211      ( 26 Jan 2023 06:15 )             37.6     01-26    144  |  106  |  20  ----------------------------<  90  4.3   |  29  |  0.65    Ca    9.3      26 Jan 2023 06:15    TPro  6.9  /  Alb  2.8<L>  /  TBili  0.3  /  DBili  x   /  AST  32  /  ALT  27  /  AlkPhos  124<H>  01-26            RADIOLOGY/OTHER RESULTS      MEDICATIONS  (STANDING):  atorvastatin 20 milliGRAM(s) Oral at bedtime  donepezil 5 milliGRAM(s) Oral daily  enoxaparin Injectable 40 milliGRAM(s) SubCutaneous <User Schedule>  latanoprost 0.005% Ophthalmic Solution 1 Drop(s) Both EYES at bedtime  memantine 5 milliGRAM(s) Oral two times a day  senna 2 Tablet(s) Oral at bedtime    MEDICATIONS  (PRN):  acetaminophen     Tablet .. 650 milliGRAM(s) Oral every 6 hours PRN Temp greater or equal to 38C (100.4F), Mild Pain (1 - 3)  polyethylene glycol 3350 17 Gram(s) Oral two times a day PRN Constipation

## 2023-01-27 PROCEDURE — 99232 SBSQ HOSP IP/OBS MODERATE 35: CPT

## 2023-01-27 RX ADMIN — DONEPEZIL HYDROCHLORIDE 5 MILLIGRAM(S): 10 TABLET, FILM COATED ORAL at 11:53

## 2023-01-27 RX ADMIN — MEMANTINE HYDROCHLORIDE 5 MILLIGRAM(S): 10 TABLET ORAL at 05:11

## 2023-01-27 RX ADMIN — MEMANTINE HYDROCHLORIDE 5 MILLIGRAM(S): 10 TABLET ORAL at 18:15

## 2023-01-27 RX ADMIN — ATORVASTATIN CALCIUM 20 MILLIGRAM(S): 80 TABLET, FILM COATED ORAL at 22:05

## 2023-01-27 RX ADMIN — ENOXAPARIN SODIUM 40 MILLIGRAM(S): 100 INJECTION SUBCUTANEOUS at 18:16

## 2023-01-27 RX ADMIN — LATANOPROST 1 DROP(S): 0.05 SOLUTION/ DROPS OPHTHALMIC; TOPICAL at 22:05

## 2023-01-27 RX ADMIN — SENNA PLUS 2 TABLET(S): 8.6 TABLET ORAL at 22:05

## 2023-01-27 NOTE — PROGRESS NOTE ADULT - SUBJECTIVE AND OBJECTIVE BOX
HPI:  82y female with a history of HTN, HLD, glaucoma who presented to [facility] on 12/28/22 after fall and found to have CTH with largely chronic L SDH (approx 2cm) w/ 8mm MLS.  Exam on admission was AOx2, PERRL, EOMI, no facial, slight R drift, LOWERY at least 4+/5.  On 12/29/22 patient underwent L francis hole for SDH evacuation. She was started on brivaracetam to complete a 7 day course (last dose to be 1/5 pm). Asprin continued to be held in the setting of SDH.  Hospital course notably for elevated CK and troponin  Cardiology consulted and work-up including EKG and TTE wnl.  CK downtrended throughout course and elevated CK and troponin likely secondary to acute rhabdomyolysis secondary to fall.  BP was monitored and amlodipine decreased to 2.5 mg (was 5 mg) for soft BP. Patient was evaluated by Physical Therapy and was recommended for Acute rehab to improve cognitive and physical function.   (04 Jan 2023 14:18)          Subjective:  Slept during the night.  Denies pain.  seen this AM working on crossword puzzles.  Spoke with speech therapy regarding pt's progress.  Attention is improved but still with significant STM impairment and impulsivity.        VITALS  Vital Signs Last 24 Hrs  T(C): 36.5 (27 Jan 2023 07:50), Max: 36.5 (27 Jan 2023 07:50)  T(F): 97.7 (27 Jan 2023 07:50), Max: 97.7 (27 Jan 2023 07:50)  HR: 79 (27 Jan 2023 07:50) (69 - 79)  BP: 125/64 (27 Jan 2023 07:50) (124/72 - 125/64)  BP(mean): --  RR: 16 (27 Jan 2023 07:50) (16 - 16)  SpO2: 99% (27 Jan 2023 07:50) (99% - 100%)    Parameters below as of 27 Jan 2023 07:50  Patient On (Oxygen Delivery Method): room air        REVIEW OF SYMPTOMS  Neurological deficits-cognitive deficits, impulsivity      PHYSICAL EXAM  Constitutional - NAD, Comfortable  HEENT -left cranial surgical incision-healing well  Chest - breathing comfortably on RA  Cardio - warm and well perfused, RRR, no murmur  Abdomen -  Soft, NTND   Extr-- Left foot 2nd digit hammer toe with callus at distal tip  Neurologic Exam:                    Cognitive -             Orientation: Awake and Alert to self, year, date but not month, place but not name of place            Attention:  Impaired-- unable to recall 0/3 objects even with MC options; Able to repeat days of the week backwards     Speech - + word finding difficulties; Fluent, Comprehensible, No dysarthria, No aphasia, repetition intact     Motor -                      LEFT    UE - ShAB 5/5, EF 5/5, EE 5/5, WE 5/5,  WNL                    RIGHT UE - ShAB 5/5, EF 5/5, EE 5/5, WE 5/5,  WNL                    LEFT    LE - HF 3/5, KE 4/5, DF 5/5, PF 5/5                    RIGHT LE - HF 5/5, KE 5/5, DF 5/5, PF 5/5     Psychiatric - Mood stable, Affect WNL      RECENT LABS                        11.7   6.92  )-----------( 211      ( 26 Jan 2023 06:15 )             37.6     01-26    144  |  106  |  20  ----------------------------<  90  4.3   |  29  |  0.65    Ca    9.3      26 Jan 2023 06:15    TPro  6.9  /  Alb  2.8<L>  /  TBili  0.3  /  DBili  x   /  AST  32  /  ALT  27  /  AlkPhos  124<H>  01-26            RADIOLOGY/OTHER RESULTS      MEDICATIONS  (STANDING):  atorvastatin 20 milliGRAM(s) Oral at bedtime  donepezil 5 milliGRAM(s) Oral daily  enoxaparin Injectable 40 milliGRAM(s) SubCutaneous <User Schedule>  latanoprost 0.005% Ophthalmic Solution 1 Drop(s) Both EYES at bedtime  memantine 5 milliGRAM(s) Oral two times a day  senna 2 Tablet(s) Oral at bedtime    MEDICATIONS  (PRN):  acetaminophen     Tablet .. 650 milliGRAM(s) Oral every 6 hours PRN Temp greater or equal to 38C (100.4F), Mild Pain (1 - 3)  polyethylene glycol 3350 17 Gram(s) Oral two times a day PRN Constipation

## 2023-01-27 NOTE — PROGRESS NOTE ADULT - ASSESSMENT
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     Admitted to [place] acute inpatient rehab on 1/4/23 with cognitive deficits, and  ADL, gait, and functional impairments.     # Traumatic SDH  - Comprehensive Multidisciplinary Rehab Program: 3 hours a day, 5 days a week with PT/OT/SLP     P&O as needed  -s/p left Francis hole 12/29/22  --Seizure ppx:  brivaracetam completed- 7 days-on 1/5  -staples removed 1/8/23-healing well  -continue to hold ASA per neurosurg  -Repeat CT head 1/10-  Previously noted left-sided subdural hematoma is again identified. Decrease in size, mass effect and decreased left-to-right shift is seen.  --Needs  monitoring for impulsivity.     # Cognitive Deficits  -Cont. Namenda 5mg bid started 1/18-- no adverse effects, no GI upset, N/V  -Cont. Donepezil 5mg QD 1/20/23 --monitor for GI Sx-- none reported     Neuroendocrine w/u-  --AM cortisol, TSH and free T4 wnl    #Glaucoma  -c/w home eyedrops    #HTN  -dc'd amlodipine 1/19 due to low BP will continue to monitor  -BP controlled off meds    #HLD  -c/w atorvastatin    # Sleep:  - Maintain low stim environment    # Pain Management:  - Tylenol PRN  --Left foot 2nd digit hammer toe with callus at distal tip--Podiatry consult for management      # GI/Bowel:  - Senna QHS, Miralax daily     # /Bladder:   - Encourage timed voids every 4 hours while awake     # Skin/Pressure Injury:  - Skin assessment on admission:  right lateral hip abrasion 5cm x 2cm likely secondary to fall at home-- apply wound gel, cavilon to periwound and cover with allvyn dressing.   - Monitor Incisions: left surgical clean incision intact staples removed 1/8    # Diet:   - Diet Consistency/Modifications: Regular diet  - Aspiration Precautions  - c/w  SLP treatment    # DVT ppx:  - lovenox  - Last Doppler on 12/29 negative for DVT      # Restrictions/Precautions:  - Precautions: falls, seizures, aspiration    IDT 1/24  Social work- Patient resides alone in an apartment with elevator access; independent prior,   ST- On Regular/thins; gradual progress; Mild language deficits-- word finding difficulties; Mild--Moderate Cognitive deficits-- decreasing deductive reasoning, STM, working memory --> mild to mod cognitive deificts. Will need supervision for meds and finances  OT- Dressing, toileting, transfers- CS  , Bath/shower transfer CG  PT- CS with transfers, ambulates 150 ft with RW CS, 8 Steps 1 rails with min A  Goals- ambulate to bathroom with RW with supervision, appropriate call bell use, Improve problem solving and safety awareness   Dispo- covered through 1/30; Requested 2/1 for discharge home with 24hr aid

## 2023-01-27 NOTE — PROGRESS NOTE ADULT - SUBJECTIVE AND OBJECTIVE BOX
Patient is a 82y old  Female who presents with a chief complaint of Traumatic SDH (26 Jan 2023 11:19)      Patient seen and examined at bedside.  No overnight events  No complaints this morning    ALLERGIES:  hydrochlorothiazide (Unknown)    MEDICATIONS  (STANDING):  atorvastatin 20 milliGRAM(s) Oral at bedtime  donepezil 5 milliGRAM(s) Oral daily  enoxaparin Injectable 40 milliGRAM(s) SubCutaneous <User Schedule>  latanoprost 0.005% Ophthalmic Solution 1 Drop(s) Both EYES at bedtime  memantine 5 milliGRAM(s) Oral two times a day  senna 2 Tablet(s) Oral at bedtime    MEDICATIONS  (PRN):  acetaminophen     Tablet .. 650 milliGRAM(s) Oral every 6 hours PRN Temp greater or equal to 38C (100.4F), Mild Pain (1 - 3)  polyethylene glycol 3350 17 Gram(s) Oral two times a day PRN Constipation    Vital Signs Last 24 Hrs  T(F): 97.7 (27 Jan 2023 07:50), Max: 97.7 (27 Jan 2023 07:50)  HR: 79 (27 Jan 2023 07:50) (69 - 79)  BP: 125/64 (27 Jan 2023 07:50) (124/72 - 125/64)  RR: 16 (27 Jan 2023 07:50) (16 - 16)  SpO2: 99% (27 Jan 2023 07:50) (99% - 100%)  I&O's Summary    PHYSICAL EXAM:  GENERAL: NAD  HENT:  Atraumatic, Normocephalic; No tonsillar erythema, exudates, or enlargement; Moist mucous membranes;   EYES: EOMI, PERRLA, conjunctiva and sclera clear, no lid-lag  NECK: Supple, No JVD, Normal thyroid  CHEST/LUNG: Clear to percussion bilaterally; No rales, rhonchi, wheezing, or rubs; normal respiratory effort, no intercostal retractions  HEART: Regular rate and rhythm; No murmurs, rubs, or gallops; No pitting edema  ABDOMEN: Soft, Nontender, Nondistended; Bowel sounds present; No HSM  MUSCULOSKELETAL/EXTREMITIES:  2+ Peripheral Pulses, No clubbing or digital cyanosis  PSYCH: Appropriate affect, Alert & Awake    LABS:                        11.7   6.92  )-----------( 211      ( 26 Jan 2023 06:15 )             37.6       01-26    144  |  106  |  20  ----------------------------<  90  4.3   |  29  |  0.65    Ca    9.3      26 Jan 2023 06:15    TPro  6.9  /  Alb  2.8  /  TBili  0.3  /  DBili  x   /  AST  32  /  ALT  27  /  AlkPhos  124  01-26 12-29 Chol 157 mg/dL LDL -- HDL 69 mg/dL Trig 66 mg/dL    COVID-19 PCR: Kristi (01-04-23 @ 16:15)  COVID-19 PCR: Kristi (01-03-23 @ 10:19)    Care Discussed with Rehab Attending and Other Providers

## 2023-01-27 NOTE — PROGRESS NOTE ADULT - ASSESSMENT
82y female with a history of HTN, HLD, glaucoma who presented to 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     #LE edema  -ULT on 12/29 negative for DVT  -c/w compression stockings  -Continue Lovenox DVT ppx    #Traumatic SDH  -s/p left Francis hole 12/29/22  -s/p brivaracetam  -continue to hold ASA per neurosurg  -continue donepezil and memantine  -rehab per primary team    #HLD  -continue atorvastatin    #Normocytic anemia  -Stable H/H  -Continue to monitor H/H    #Rhabdomyolysis   -Resolved    #HTN  -Hold amlodipine since BP appropriate    #Glaucoma  -continue latanoprost     # DVT ppx  -lovenox

## 2023-01-28 PROCEDURE — 99232 SBSQ HOSP IP/OBS MODERATE 35: CPT

## 2023-01-28 RX ADMIN — LATANOPROST 1 DROP(S): 0.05 SOLUTION/ DROPS OPHTHALMIC; TOPICAL at 21:43

## 2023-01-28 RX ADMIN — DONEPEZIL HYDROCHLORIDE 5 MILLIGRAM(S): 10 TABLET, FILM COATED ORAL at 11:53

## 2023-01-28 RX ADMIN — MEMANTINE HYDROCHLORIDE 5 MILLIGRAM(S): 10 TABLET ORAL at 18:06

## 2023-01-28 RX ADMIN — MEMANTINE HYDROCHLORIDE 5 MILLIGRAM(S): 10 TABLET ORAL at 05:46

## 2023-01-28 RX ADMIN — ENOXAPARIN SODIUM 40 MILLIGRAM(S): 100 INJECTION SUBCUTANEOUS at 18:06

## 2023-01-28 RX ADMIN — ATORVASTATIN CALCIUM 20 MILLIGRAM(S): 80 TABLET, FILM COATED ORAL at 21:42

## 2023-01-28 NOTE — PROGRESS NOTE ADULT - COMMENTS
Patient awake, alert, pleasant although reduced recall and overall insight. She reports sleeping well last night, no H/a. No B/B complaints, no appetite issues. Demeanor is pleasant and calm

## 2023-01-28 NOTE — PROGRESS NOTE ADULT - ASSESSMENT
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     # Traumatic SDH  -s/p left Francis hole 12/29/22  - Repeat CT head 1/10-  Previously noted left-sided subdural hematoma is again identified. Decrease in size, mass effect and decreased left-to-right shift is seen.  - continue comprehensive rehab program Ot PT SLP 3 hours daily 5 x week    # Cognitive Deficits  - Namenda 5mg bid  - Donepezil 5mg QD 1/20/23    # Neuroendocrine w/u  - AM cortisol, TSH and free T4 wnl    # Glaucoma  - home eyedrops    # HTN  - BP controlled off meds  -  (116/54 - 148/63) 1/28    # HLD  -c/w atorvastatin    # Pain Management:  - Tylenol PRN    # GI/Bowel:  - Senna QHS  -  Miralax daily     # Skin/Pressure Injury:  -  right lateral hip abrasion 5cm x 2cm likely secondary to fall at home-- apply wound gel, cavilon to periwound and cover with allvyn dressing.     # Diet:   - Diet Consistency/Modifications: Regular diet    # DVT ppx:  - lovenox  - Last Doppler on 12/29 negative for DVT

## 2023-01-28 NOTE — PROGRESS NOTE ADULT - SUBJECTIVE AND OBJECTIVE BOX
Patient is a 82y old  Female who presents with a chief complaint of Traumatic SDH (27 Jan 2023 14:52)      HPI:  82y female with a history of HTN, HLD, glaucoma who presented to [facility] on 12/28/22 after fall and found to have CTH with largely chronic L SDH (approx 2cm) w/ 8mm MLS.  Exam on admission was AOx2, PERRL, EOMI, no facial, slight R drift, LOWERY at least 4+/5.  On 12/29/22 patient underwent L francis hole for SDH evacuation. She was started on brivaracetam to complete a 7 day course (last dose to be 1/5 pm). Asprin continued to be held in the setting of SDH.  Hospital course notably for elevated CK and troponin  Cardiology consulted and work-up including EKG and TTE wnl.  CK downtrended throughout course and elevated CK and troponin likely secondary to acute rhabdomyolysis secondary to fall.  BP was monitored and amlodipine decreased to 2.5 mg (was 5 mg) for soft BP. Patient was evaluated by Physical Therapy and was recommended for Acute rehab to improve cognitive and physical function.   (04 Jan 2023 14:18)      PAST MEDICAL & SURGICAL HISTORY:  HLD (hyperlipidemia)      HTN (hypertension)      S/P hysterectomy      Benign teratoma          MEDICATIONS  (STANDING):  atorvastatin 20 milliGRAM(s) Oral at bedtime  donepezil 5 milliGRAM(s) Oral daily  enoxaparin Injectable 40 milliGRAM(s) SubCutaneous <User Schedule>  latanoprost 0.005% Ophthalmic Solution 1 Drop(s) Both EYES at bedtime  memantine 5 milliGRAM(s) Oral two times a day  senna 2 Tablet(s) Oral at bedtime    MEDICATIONS  (PRN):  acetaminophen     Tablet .. 650 milliGRAM(s) Oral every 6 hours PRN Temp greater or equal to 38C (100.4F), Mild Pain (1 - 3)  polyethylene glycol 3350 17 Gram(s) Oral two times a day PRN Constipation      Allergies    hydrochlorothiazide (Unknown)    Intolerances          VITALS  82y  Vital Signs Last 24 Hrs  T(C): 36.7 (28 Jan 2023 07:26), Max: 36.7 (27 Jan 2023 20:48)  T(F): 98 (28 Jan 2023 07:26), Max: 98 (27 Jan 2023 20:48)  HR: 59 (28 Jan 2023 07:26) (59 - 78)  BP: 116/54 (28 Jan 2023 07:26) (116/54 - 148/63)  BP(mean): --  RR: 16 (28 Jan 2023 07:26) (15 - 16)  SpO2: 98% (28 Jan 2023 07:26) (96% - 98%)    Parameters below as of 28 Jan 2023 07:26  Patient On (Oxygen Delivery Method): room air      Daily     Daily         RECENT LABS:                      CAPILLARY BLOOD GLUCOSE

## 2023-01-29 PROCEDURE — 99231 SBSQ HOSP IP/OBS SF/LOW 25: CPT

## 2023-01-29 PROCEDURE — 99232 SBSQ HOSP IP/OBS MODERATE 35: CPT

## 2023-01-29 RX ADMIN — MEMANTINE HYDROCHLORIDE 5 MILLIGRAM(S): 10 TABLET ORAL at 17:02

## 2023-01-29 RX ADMIN — ATORVASTATIN CALCIUM 20 MILLIGRAM(S): 80 TABLET, FILM COATED ORAL at 21:12

## 2023-01-29 RX ADMIN — LATANOPROST 1 DROP(S): 0.05 SOLUTION/ DROPS OPHTHALMIC; TOPICAL at 21:13

## 2023-01-29 RX ADMIN — ENOXAPARIN SODIUM 40 MILLIGRAM(S): 100 INJECTION SUBCUTANEOUS at 17:02

## 2023-01-29 RX ADMIN — DONEPEZIL HYDROCHLORIDE 5 MILLIGRAM(S): 10 TABLET, FILM COATED ORAL at 11:24

## 2023-01-29 RX ADMIN — MEMANTINE HYDROCHLORIDE 5 MILLIGRAM(S): 10 TABLET ORAL at 06:17

## 2023-01-29 NOTE — PROGRESS NOTE ADULT - ASSESSMENT
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     # Traumatic SDH  - s/p left [place] hole 12/29/22  - Repeat CT head 1/10-  Previously noted left-sided subdural hematoma is again identified. Decrease in size, mass effect and decreased left-to-right shift is seen.  - continue comprehensive rehab program Ot PT SLP 3 hours daily 5 x week  - tentative dc 2/1, discussed with patient    # Cognitive Deficits  - Namenda 5mg bid  - Donepezil 5mg QD 1/20/23    # Neuroendocrine w/u  - AM cortisol, TSH and free T4 wnl    # Glaucoma  - home eyedrops    # HTN  - BP controlled off meds  - (101/64 - 132/72) 1/29    # HLD  -c/w atorvastatin    # Pain Management:  - Tylenol PRN    # GI/Bowel:  - Senna QHS  - Miralax daily     # Skin/Pressure Injury:  - right lateral hip abrasion 5cm x 2cm likely secondary to fall at home-- apply wound gel, cavilon to periwound and cover with allvyn dressing.     # Diet:   - Diet Consistency/Modifications: Regular diet    # DVT ppx:  - lovenox  - Last Doppler on 12/29 negative for DVT

## 2023-01-29 NOTE — PROGRESS NOTE ADULT - SUBJECTIVE AND OBJECTIVE BOX
Hospitalist: Caroline Ocampo DO    CHIEF COMPLAINT: Patient is a 82y old  female who presents with a chief complaint of Traumatic SDH (29 Jan 2023 09:43)      SUBJECTIVE / OVERNIGHT EVENTS: Patient seen and examined. No acute events overnight. Pain well controlled and patient without any complaints.    MEDICATIONS  (STANDING):  atorvastatin 20 milliGRAM(s) Oral at bedtime  donepezil 5 milliGRAM(s) Oral daily  enoxaparin Injectable 40 milliGRAM(s) SubCutaneous <User Schedule>  latanoprost 0.005% Ophthalmic Solution 1 Drop(s) Both EYES at bedtime  memantine 5 milliGRAM(s) Oral two times a day  senna 2 Tablet(s) Oral at bedtime    MEDICATIONS  (PRN):  acetaminophen     Tablet .. 650 milliGRAM(s) Oral every 6 hours PRN Temp greater or equal to 38C (100.4F), Mild Pain (1 - 3)  polyethylene glycol 3350 17 Gram(s) Oral two times a day PRN Constipation      VITALS:  T(F): 98.5 (01-29-23 @ 07:36), Max: 98.5 (01-29-23 @ 07:36)  HR: 66 (01-29-23 @ 07:36) (66 - 76)  BP: 132/72 (01-29-23 @ 07:36) (101/64 - 132/72)  RR: 15 (01-29-23 @ 07:36) (15 - 15)  SpO2: 99% (01-29-23 @ 07:36)      REVIEW OF SYSTEMS:  For ROV please refer back to H&P     PHYSICAL EXAM:  GENERAL: NAD  HENT:  Atraumatic, Normocephalic; No tonsillar erythema, exudates, or enlargement; Moist mucous membranes;   EYES: EOMI, PERRLA, conjunctiva and sclera clear, no lid-lag  NECK: Supple, No JVD, Normal thyroid  CHEST/LUNG: Clear to percussion bilaterally; No rales, rhonchi, wheezing, or rubs; normal respiratory effort, no intercostal retractions  HEART: Regular rate and rhythm; No murmurs, rubs, or gallops; No pitting edema  ABDOMEN: Soft, Nontender, Nondistended; Bowel sounds present; No HSM  MUSCULOSKELETAL/EXTREMITIES:  2+ Peripheral Pulses, No clubbing or digital cyanosis  PSYCH: Appropriate affect, Alert & Awake      RADIOLOGY & ADDITIONAL TESTS:    Imaging Personally Reviewed:    [X] Consultant(s) Notes Reviewed:  [X] Care Discussed with Consultants/Other Providers:

## 2023-01-29 NOTE — PROGRESS NOTE ADULT - SUBJECTIVE AND OBJECTIVE BOX
Patient is a 82y old  Female who presents with a chief complaint of Traumatic SDH (2023 09:03)      HPI:  82y female with a history of HTN, HLD, glaucoma who presented to [facility] on 22 after fall and found to have CTH with largely chronic L SDH (approx 2cm) w/ 8mm MLS.  Exam on admission was AOx2, PERRL, EOMI, no facial, slight R drift, LOWERY at least 4+/5.  On 22 patient underwent L francis hole for SDH evacuation. She was started on brivaracetam to complete a 7 day course (last dose to be 1/5 pm). Asprin continued to be held in the setting of SDH.  Hospital course notably for elevated CK and troponin  Cardiology consulted and work-up including EKG and TTE wnl.  CK downtrended throughout course and elevated CK and troponin likely secondary to acute rhabdomyolysis secondary to fall.  BP was monitored and amlodipine decreased to 2.5 mg (was 5 mg) for soft BP. Patient was evaluated by Physical Therapy and was recommended for Acute rehab to improve cognitive and physical function.   (2023 14:18)      PAST MEDICAL & SURGICAL HISTORY:  HLD (hyperlipidemia)      HTN (hypertension)      S/P hysterectomy      Benign teratoma          MEDICATIONS  (STANDING):  atorvastatin 20 milliGRAM(s) Oral at bedtime  donepezil 5 milliGRAM(s) Oral daily  enoxaparin Injectable 40 milliGRAM(s) SubCutaneous <User Schedule>  latanoprost 0.005% Ophthalmic Solution 1 Drop(s) Both EYES at bedtime  memantine 5 milliGRAM(s) Oral two times a day  senna 2 Tablet(s) Oral at bedtime    MEDICATIONS  (PRN):  acetaminophen     Tablet .. 650 milliGRAM(s) Oral every 6 hours PRN Temp greater or equal to 38C (100.4F), Mild Pain (1 - 3)  polyethylene glycol 3350 17 Gram(s) Oral two times a day PRN Constipation      Allergies    hydrochlorothiazide (Unknown)    Intolerances          VITALS  82y  Vital Signs Last 24 Hrs  T(C): 36.9 (2023 07:36), Max: 36.9 (2023 19:31)  T(F): 98.5 (2023 07:36), Max: 98.5 (2023 07:36)  HR: 66 (2023 07:36) (66 - 76)  BP: 132/72 (2023 07:36) (101/64 - 132/72)  BP(mean): --  RR: 15 (2023 07:36) (15 - 15)  SpO2: 99% (2023 07:36) (99% - 99%)    Parameters below as of 2023 07:36  Patient On (Oxygen Delivery Method): room air      Daily     Daily Weight in k.5 (2023 23:50)        RECENT LABS:                      CAPILLARY BLOOD GLUCOSE              Review of Systems:   · Additional ROS	Patient stable, slept well, no H/A, good appetite, no B/B complaints. Pleasant, recalls me from yesterday but not what we discussed    Tentative dc  reiterated this morning    Physical Exam:   · Constitutional Comments	alert O x 2-3 Comfortable, afebrile  · Respiratory	clear to auscultation bilaterally; no wheezes; no rales; no rhonchi  · Cardiovascular	regular rate and rhythm; S1 S2 present; no gallops; no rub; no murmur  · Gastrointestinal	soft; nontender; nondistended; normal active bowel sounds  · Motor	bilateral calves soft no TTP  BUE motor 5/5

## 2023-01-29 NOTE — PROGRESS NOTE ADULT - ASSESSMENT
82y female with a history of HTN, HLD, glaucoma who presented to [facility] on 12/28/22 after fall and found to have chronic left SDH s/p L francis hole SDH evacuation.     #LE edema  -ULT on 12/29 negative for DVT  -c/w compression stockings  -Continue Lovenox DVT ppx    #Traumatic SDH  -s/p left [place] hole 12/29/22  -s/p brivaracetam  -continue to hold ASA per neurosurg  -continue donepezil and memantine  -rehab per primary team    #HLD  -continue atorvastatin    #Normocytic anemia  -Stable H/H  -Continue to monitor H/H    #Rhabdomyolysis   -Resolved    #HTN  -Hold amlodipine since BP appropriate    #Glaucoma  -continue latanoprost     # DVT ppx  -lovenox

## 2023-01-30 LAB
ALBUMIN SERPL ELPH-MCNC: 2.9 G/DL — LOW (ref 3.3–5)
ALP SERPL-CCNC: 108 U/L — SIGNIFICANT CHANGE UP (ref 40–120)
ALT FLD-CCNC: 21 U/L — SIGNIFICANT CHANGE UP (ref 10–45)
ANION GAP SERPL CALC-SCNC: 6 MMOL/L — SIGNIFICANT CHANGE UP (ref 5–17)
AST SERPL-CCNC: 23 U/L — SIGNIFICANT CHANGE UP (ref 10–40)
BILIRUB SERPL-MCNC: 0.3 MG/DL — SIGNIFICANT CHANGE UP (ref 0.2–1.2)
BUN SERPL-MCNC: 17 MG/DL — SIGNIFICANT CHANGE UP (ref 7–23)
CALCIUM SERPL-MCNC: 9.1 MG/DL — SIGNIFICANT CHANGE UP (ref 8.4–10.5)
CHLORIDE SERPL-SCNC: 108 MMOL/L — SIGNIFICANT CHANGE UP (ref 96–108)
CO2 SERPL-SCNC: 31 MMOL/L — SIGNIFICANT CHANGE UP (ref 22–31)
CREAT SERPL-MCNC: 0.7 MG/DL — SIGNIFICANT CHANGE UP (ref 0.5–1.3)
EGFR: 86 ML/MIN/1.73M2 — SIGNIFICANT CHANGE UP
GLUCOSE SERPL-MCNC: 96 MG/DL — SIGNIFICANT CHANGE UP (ref 70–99)
HCT VFR BLD CALC: 35.7 % — SIGNIFICANT CHANGE UP (ref 34.5–45)
HGB BLD-MCNC: 11.3 G/DL — LOW (ref 11.5–15.5)
MCHC RBC-ENTMCNC: 26.5 PG — LOW (ref 27–34)
MCHC RBC-ENTMCNC: 31.7 GM/DL — LOW (ref 32–36)
MCV RBC AUTO: 83.8 FL — SIGNIFICANT CHANGE UP (ref 80–100)
NRBC # BLD: 0 /100 WBCS — SIGNIFICANT CHANGE UP (ref 0–0)
PLATELET # BLD AUTO: 179 K/UL — SIGNIFICANT CHANGE UP (ref 150–400)
POTASSIUM SERPL-MCNC: 4.8 MMOL/L — SIGNIFICANT CHANGE UP (ref 3.5–5.3)
POTASSIUM SERPL-SCNC: 4.8 MMOL/L — SIGNIFICANT CHANGE UP (ref 3.5–5.3)
PROT SERPL-MCNC: 6.8 G/DL — SIGNIFICANT CHANGE UP (ref 6–8.3)
RBC # BLD: 4.26 M/UL — SIGNIFICANT CHANGE UP (ref 3.8–5.2)
RBC # FLD: 15.6 % — HIGH (ref 10.3–14.5)
SODIUM SERPL-SCNC: 145 MMOL/L — SIGNIFICANT CHANGE UP (ref 135–145)
WBC # BLD: 5.73 K/UL — SIGNIFICANT CHANGE UP (ref 3.8–10.5)
WBC # FLD AUTO: 5.73 K/UL — SIGNIFICANT CHANGE UP (ref 3.8–10.5)

## 2023-01-30 PROCEDURE — 99232 SBSQ HOSP IP/OBS MODERATE 35: CPT

## 2023-01-30 RX ADMIN — ENOXAPARIN SODIUM 40 MILLIGRAM(S): 100 INJECTION SUBCUTANEOUS at 18:20

## 2023-01-30 RX ADMIN — MEMANTINE HYDROCHLORIDE 5 MILLIGRAM(S): 10 TABLET ORAL at 17:29

## 2023-01-30 RX ADMIN — DONEPEZIL HYDROCHLORIDE 5 MILLIGRAM(S): 10 TABLET, FILM COATED ORAL at 15:06

## 2023-01-30 RX ADMIN — LATANOPROST 1 DROP(S): 0.05 SOLUTION/ DROPS OPHTHALMIC; TOPICAL at 22:14

## 2023-01-30 RX ADMIN — MEMANTINE HYDROCHLORIDE 5 MILLIGRAM(S): 10 TABLET ORAL at 05:31

## 2023-01-30 RX ADMIN — SENNA PLUS 2 TABLET(S): 8.6 TABLET ORAL at 22:13

## 2023-01-30 RX ADMIN — ATORVASTATIN CALCIUM 20 MILLIGRAM(S): 80 TABLET, FILM COATED ORAL at 22:13

## 2023-01-30 NOTE — PROGRESS NOTE ADULT - ASSESSMENT
82y female with a history of HTN, HLD, glaucoma who presented to [facility] on 12/28/22 after fall and found to have chronic left SDH s/p L [place] hole SDH evacuation.     #LE edema  -ULT on 12/29 negative for DVT  -c/w compression stockings  -Continue Lovenox DVT ppx    #Traumatic SDH  -s/p left [place] hole 12/29/22  -s/p brivaracetam  -continue to hold ASA per neurosurg  -continue donepezil and memantine  -rehab per primary team    #HLD  -continue atorvastatin    #Normocytic anemia  -Stable H/H  -Continue to monitor H/H    #Rhabdomyolysis   -Resolved    #HTN  -Hold amlodipine since BP appropriate    #Glaucoma  -continue latanoprost     # DVT ppx  -lovenox

## 2023-01-30 NOTE — PROGRESS NOTE ADULT - SUBJECTIVE AND OBJECTIVE BOX
Patient is a 82y old  Female who presents with a chief complaint of Traumatic SDH (29 Jan 2023 13:41)      Patient seen and examined at bedside.  No overnight events  No complaints this morning    ALLERGIES:  hydrochlorothiazide (Unknown)    MEDICATIONS  (STANDING):  atorvastatin 20 milliGRAM(s) Oral at bedtime  donepezil 5 milliGRAM(s) Oral daily  enoxaparin Injectable 40 milliGRAM(s) SubCutaneous <User Schedule>  latanoprost 0.005% Ophthalmic Solution 1 Drop(s) Both EYES at bedtime  memantine 5 milliGRAM(s) Oral two times a day  senna 2 Tablet(s) Oral at bedtime    MEDICATIONS  (PRN):  acetaminophen     Tablet .. 650 milliGRAM(s) Oral every 6 hours PRN Temp greater or equal to 38C (100.4F), Mild Pain (1 - 3)  polyethylene glycol 3350 17 Gram(s) Oral two times a day PRN Constipation    Vital Signs Last 24 Hrs  T(F): 98.3 (30 Jan 2023 09:37), Max: 98.3 (30 Jan 2023 09:37)  HR: 69 (30 Jan 2023 09:37) (67 - 69)  BP: 152/80 (30 Jan 2023 09:37) (125/66 - 152/80)  RR: 15 (30 Jan 2023 09:37) (15 - 15)  SpO2: 98% (30 Jan 2023 09:37) (98% - 100%)  I&O's Summary    29 Jan 2023 07:01  -  30 Jan 2023 07:00  --------------------------------------------------------  IN: 0 mL / OUT: 1 mL / NET: -1 mL    PHYSICAL EXAM:  GENERAL: NAD  HENT:  Atraumatic, Normocephalic; No tonsillar erythema, exudates, or enlargement; Moist mucous membranes;   EYES: EOMI, PERRLA, conjunctiva and sclera clear, no lid-lag  NECK: Supple, No JVD, Normal thyroid  CHEST/LUNG: Clear to percussion bilaterally; No rales, rhonchi, wheezing, or rubs; normal respiratory effort, no intercostal retractions  HEART: Regular rate and rhythm; No murmurs, rubs, or gallops; No pitting edema  ABDOMEN: Soft, Nontender, Nondistended; Bowel sounds present; No HSM  MUSCULOSKELETAL/EXTREMITIES:  2+ Peripheral Pulses, No clubbing or digital cyanosis  PSYCH: Appropriate affect, Alert & Awake; Good judgement    LABS:                        11.3   5.73  )-----------( 179      ( 30 Jan 2023 06:06 )             35.7       01-30    145  |  108  |  17  ----------------------------<  96  4.8   |  31  |  0.70    Ca    9.1      30 Jan 2023 06:06    TPro  6.8  /  Alb  2.9  /  TBili  0.3  /  DBili  x   /  AST  23  /  ALT  21  /  AlkPhos  108  01-30     12-29 Chol 157 mg/dL LDL -- HDL 69 mg/dL Trig 66 mg/dL    COVID-19 PCR: Kristi (01-04-23 @ 16:15)  COVID-19 PCR: Kristi (01-03-23 @ 10:19)      Care Discussed with Rehab Attending and Other Providers

## 2023-01-30 NOTE — PROGRESS NOTE ADULT - SUBJECTIVE AND OBJECTIVE BOX
HISTORY OF PRESENT ILLNESS  82y female with a history of HTN, HLD, glaucoma who presented to [facility] on 12/28/22 after fall and found to have CTH with largely chronic L SDH (approx 2cm) w/ 8mm MLS.  Exam on admission was AOx2, PERRL, EOMI, no facial, slight R drift, LOWERY at least 4+/5.  On 12/29/22 patient underwent L francis hole for SDH evacuation. She was started on brivaracetam to complete a 7 day course (last dose to be 1/5 pm). Asprin continued to be held in the setting of SDH.  Hospital course notably for elevated CK and troponin  Cardiology consulted and work-up including EKG and TTE wnl.  CK downtrended throughout course and elevated CK and troponin likely secondary to acute rhabdomyolysis secondary to fall.  BP was monitored and amlodipine decreased to 2.5 mg (was 5 mg) for soft BP. Patient was evaluated by Physical Therapy and was recommended for Acute rehab to improve cognitive and physical function.    Subjective: NAD overnight.  Denies pain. Seen this AM, NAD, in good spirits. Attention is improved but still with significant STM impairment and impulsivity.        VITALS  Vital Signs Last 24 Hrs  T(C): 36.8 (30 Jan 2023 09:37), Max: 36.8 (30 Jan 2023 09:37)  T(F): 98.3 (30 Jan 2023 09:37), Max: 98.3 (30 Jan 2023 09:37)  HR: 69 (30 Jan 2023 09:37) (67 - 69)  BP: 152/80 (30 Jan 2023 09:37) (125/66 - 152/80)  BP(mean): --  RR: 15 (30 Jan 2023 09:37) (15 - 15)  SpO2: 98% (30 Jan 2023 09:37) (98% - 100%)    Parameters below as of 30 Jan 2023 09:37  Patient On (Oxygen Delivery Method): room air      RECENT LABS                        11.3   5.73  )-----------( 179      ( 30 Jan 2023 06:06 )             35.7     01-30    145  |  108  |  17  ----------------------------<  96  4.8   |  31  |  0.70    Ca    9.1      30 Jan 2023 06:06    TPro  6.8  /  Alb  2.9<L>  /  TBili  0.3  /  DBili  x   /  AST  23  /  ALT  21  /  AlkPhos  108  01-30      LIVER FUNCTIONS - ( 30 Jan 2023 06:06 )  Alb: 2.9 g/dL / Pro: 6.8 g/dL / ALK PHOS: 108 U/L / ALT: 21 U/L / AST: 23 U/L / GGT: x             REVIEW OF SYMPTOMS  Neurological deficits-cognitive deficits, impulsivity      PHYSICAL EXAM  Constitutional - NAD, Comfortable  HEENT -left cranial surgical incision-healing well  Chest - breathing comfortably on RA  Cardio - warm and well perfused, RRR, no murmur  Abdomen -  Soft, NTND   Extr-- Left foot 2nd digit hammer toe with callus at distal tip  Neurologic Exam:                    Cognitive -             Orientation: Awake and Alert            Attention:  Impaired-- unable to recall 0/3 objects even with MC options; Able to repeat days of the week backwards     Speech - + word finding difficulties; Fluent, Comprehensible, No dysarthria, No aphasia, repetition intact     Motor -                      LEFT    UE - ShAB 5/5, EF 5/5, EE 5/5, WE 5/5,  WNL                    RIGHT UE - ShAB 5/5, EF 5/5, EE 5/5, WE 5/5,  WNL                    LEFT    LE - HF 3/5, KE 4/5, DF 5/5, PF 5/5                    RIGHT LE - HF 5/5, KE 5/5, DF 5/5, PF 5/5     Psychiatric - Mood stable, Affect WNL      CURRENT MEDICATIONS  MEDICATIONS  (STANDING):  atorvastatin 20 milliGRAM(s) Oral at bedtime  donepezil 5 milliGRAM(s) Oral daily  enoxaparin Injectable 40 milliGRAM(s) SubCutaneous <User Schedule>  latanoprost 0.005% Ophthalmic Solution 1 Drop(s) Both EYES at bedtime  memantine 5 milliGRAM(s) Oral two times a day  senna 2 Tablet(s) Oral at bedtime    MEDICATIONS  (PRN):  acetaminophen     Tablet .. 650 milliGRAM(s) Oral every 6 hours PRN Temp greater or equal to 38C (100.4F), Mild Pain (1 - 3)  polyethylene glycol 3350 17 Gram(s) Oral two times a day PRN Constipation      Continue comprehensive acute rehab program consisting of 3hrs/day of OT/PT and SLP.

## 2023-01-30 NOTE — PROGRESS NOTE ADULT - ASSESSMENT
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     Admitted to [facility] acute inpatient rehab on 1/4/23 with cognitive deficits, and  ADL, gait, and functional impairments.     # Traumatic SDH  - Comprehensive Multidisciplinary Rehab Program: 3 hours a day, 5 days a week with PT/OT/SLP  -s/p left [place] hole 12/29/22  --Seizure ppx:  brivaracetam completed- 7 days-on 1/5  -staples removed 1/8/23-healing well  -continue to hold ASA per neurosurg  -Repeat CT head 1/10-  Previously noted left-sided subdural hematoma is again identified. Decrease in size, mass effect and decreased left-to-right shift is seen.  --Needs  monitoring for impulsivity.     # Cognitive Deficits  -Cont. Namenda 5mg bid started 1/18-- no adverse effects, no GI upset, N/V  -Cont. Donepezil 5mg QD 1/20/23 --monitor for GI Sx-- none reported     #Glaucoma  -c/w home eyedrops    #HTN  -dc'd amlodipine 1/19 due to low BP will continue to monitor  -BP controlled off meds    #HLD  -c/w atorvastatin    # Sleep:  - Maintain low stim environment    # Pain Management:  - Tylenol PRN  --Left foot 2nd digit hammer toe     # GI/Bowel:  - Senna QHS, Miralax daily     # /Bladder:   - Encourage timed voids every 4 hours while awake     # Skin/Pressure Injury:  - Skin assessment on admission:  right lateral hip abrasion 5cm x 2cm likely secondary to fall at home-- apply wound gel, cavilon to periwound and cover with allvyn dressing.   - Monitor Incisions: left surgical clean incision intact staples removed 1/8    # Diet:   - Diet Consistency/Modifications: Regular diet  - Aspiration Precautions  - c/w  SLP treatment    # DVT ppx:  - lovenox  - Last Doppler on 12/29 negative for DVT    # Restrictions/Precautions:  - Precautions: falls, seizures, aspiration    IDT 1/24  Social work- Patient resides alone in an apartment with elevator access; independent prior,   ST- On Regular/thins; gradual progress; Mild language deficits-- word finding difficulties; Mild--Moderate Cognitive deficits-- decreasing deductive reasoning, STM, working memory --> mild to mod cognitive deificts. Will need supervision for meds and finances  OT- Dressing, toileting, transfers- CS  , Bath/shower transfer CG  PT- CS with transfers, ambulates 150 ft with RW CS, 8 Steps 1 rails with min A  Goals- ambulate to bathroom with RW with supervision, appropriate call bell use, Improve problem solving and safety awareness   Dispo- covered through 1/30; Requested 2/1 for discharge home with 24hr aid

## 2023-01-31 PROCEDURE — 99232 SBSQ HOSP IP/OBS MODERATE 35: CPT

## 2023-01-31 RX ORDER — DONEPEZIL HYDROCHLORIDE 10 MG/1
10 TABLET, FILM COATED ORAL DAILY
Refills: 0 | Status: DISCONTINUED | OUTPATIENT
Start: 2023-02-01 | End: 2023-02-02

## 2023-01-31 RX ADMIN — ENOXAPARIN SODIUM 40 MILLIGRAM(S): 100 INJECTION SUBCUTANEOUS at 17:19

## 2023-01-31 RX ADMIN — ATORVASTATIN CALCIUM 20 MILLIGRAM(S): 80 TABLET, FILM COATED ORAL at 21:06

## 2023-01-31 RX ADMIN — MEMANTINE HYDROCHLORIDE 5 MILLIGRAM(S): 10 TABLET ORAL at 17:18

## 2023-01-31 RX ADMIN — DONEPEZIL HYDROCHLORIDE 5 MILLIGRAM(S): 10 TABLET, FILM COATED ORAL at 12:00

## 2023-01-31 RX ADMIN — LATANOPROST 1 DROP(S): 0.05 SOLUTION/ DROPS OPHTHALMIC; TOPICAL at 21:06

## 2023-01-31 RX ADMIN — MEMANTINE HYDROCHLORIDE 5 MILLIGRAM(S): 10 TABLET ORAL at 05:50

## 2023-01-31 RX ADMIN — SENNA PLUS 2 TABLET(S): 8.6 TABLET ORAL at 21:06

## 2023-01-31 NOTE — PROGRESS NOTE ADULT - ASSESSMENT
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     Admitted to [facility] acute inpatient rehab on 1/4/23 with cognitive deficits, and  ADL, gait, and functional impairments.     # Traumatic SDH  - Comprehensive Multidisciplinary Rehab Program: 3 hours a day, 5 days a week with PT/OT/SLP  -s/p left [place] hole 12/29/22  --Seizure ppx:  brivaracetam completed- 7 days-on 1/5  -staples removed 1/8/23-healing well  -continue to hold ASA per neurosurg  -Repeat CT head 1/10-  Previously noted left-sided subdural hematoma is again identified. Decrease in size, mass effect and decreased left-to-right shift is seen.  --Needs  monitoring for impulsivity.     # Cognitive Deficits  -Cont. Namenda 5mg bid started 1/18-- no adverse effects, no GI upset, N/V  -Cont. Donepezil 5mg QD 1/20/23 --monitor for GI Sx-- none reported     #Glaucoma  -c/w home eyedrops    #HTN  -dc'd amlodipine 1/19 due to low BP will continue to monitor  -BP controlled off meds    #HLD  -c/w atorvastatin    # Sleep:  - Maintain low stim environment    # Pain Management:  - Tylenol PRN  --Left foot 2nd digit hammer toe     # GI/Bowel:  - Senna QHS, Miralax daily     # /Bladder:   - Encourage timed voids every 4 hours while awake     # Skin/Pressure Injury:  - Skin assessment on admission:  right lateral hip abrasion 5cm x 2cm likely secondary to fall at home-- apply wound gel, cavilon to periwound and cover with allvyn dressing.   - Monitor Incisions: left surgical clean incision intact staples removed 1/8    # Diet:   - Diet Consistency/Modifications: Regular diet  - Aspiration Precautions  - c/w  SLP treatment    # DVT ppx:  - lovenox  - Last Doppler on 12/29 negative for DVT    # Restrictions/Precautions:  - Precautions: falls, seizures, aspiration    IDT 1/31  Nursing- Continent bowel and bladder, supervision assist   Social work- Patient resides alone in an apartment with elevator access; independent prior,   ST- On Regular/thins; mild language deficits., mild-moderate cognitive deficits  OT- Dressing, toileting, transfers- CS  , Bath/shower transfer CG  PT- CS with transfers, ambulates 150 ft with RW CS, 8 Steps 1 rails with min A  Goals- ambulate to bathroom with RW with supervision, appropriate call bell use, Improve problem solving and safety awareness   Dispo- covered through 1/30; Requested 2/1 for discharge home with 24hr aid         Assessment	  82y female with a history of HTN, HLD, glaucoma who presented to [facility] on 12/28/22 after fall and found to have chronic left SDH s/p L francis hole SDH evacuation.     Admitted to [place] acute inpatient rehab on 1/4/23 with cognitive deficits, and  ADL, gait, and functional impairments.     # Traumatic SDH  - Comprehensive Multidisciplinary Rehab Program: 3 hours a day, 5 days a week with PT/OT/SLP  -s/p left [place] hole 12/29/22  --Seizure ppx:  brivaracetam completed- 7 days-on 1/5  -staples removed 1/8/23-healing well  -continue to hold ASA per neurosurg  -Repeat CT head 1/10-  Previously noted left-sided subdural hematoma is again identified. Decrease in size, mass effect and decreased left-to-right shift is seen.  --Needs  monitoring for impulsivity.     # Cognitive Deficits  -Cont. Namenda 5mg bid started 1/18-- no adverse effects, no GI upset, N/V  -Cont. Donepezil 5mg QD 1/20/23 --monitor for GI Sx-- none reported     #Glaucoma  -c/w home eyedrops    #HTN  -dc'd amlodipine 1/19 due to low BP will continue to monitor  -BP controlled off meds    #HLD  -c/w atorvastatin    # Sleep:  - Maintain low stim environment    # Pain Management:  - Tylenol PRN  --Left foot 2nd digit hammer toe     # GI/Bowel:  - Senna QHS, Miralax daily     # /Bladder:   - Encourage timed voids every 4 hours while awake     # Skin/Pressure Injury:  - Skin assessment on admission:  right lateral hip abrasion 5cm x 2cm likely secondary to fall at home-- apply wound gel, cavilon to periwound and cover with allvyn dressing.   - Monitor Incisions: left surgical clean incision intact staples removed 1/8    # Diet:   - Diet Consistency/Modifications: Regular diet  - Aspiration Precautions  - c/w  SLP treatment    # DVT ppx:  - lovenox  - Last Doppler on 12/29 negative for DVT    # Restrictions/Precautions:  - Precautions: falls, seizures, aspiration    IDT 1/31  Nursing- Continent bowel and bladder, supervision assist   Social work- Patient resides alone in an apartment with elevator access; independent prior,   ST- On Regular/thins; mild language deficits., mild-moderate cognitive deficits, short term memory impaired   OT- Supervision with ADL's, CG with shower transfers   PT- CS with transfers, ambulating more than 150 ft with RW, CS, 12 steps NBQC with hand-held support and CG   Goals- ambulate to bathroom with RW with supervision, appropriate call bell use, Improve problem solving and safety awareness   Dispo- covered through 1/30; Requested 2/1 for discharge home with 24hr aid         Assessment	  82y female with a history of HTN, HLD, glaucoma who presented to [facility] on 12/28/22 after fall and found to have chronic left SDH s/p L francis hole SDH evacuation.     Admitted to [place] acute inpatient rehab on 1/4/23 with cognitive deficits, and  ADL, gait, and functional impairments.     # Traumatic SDH  - Comprehensive Multidisciplinary Rehab Program: 3 hours a day, 5 days a week with PT/OT/SLP  -s/p left [place] hole 12/29/22  --Seizure ppx:  brivaracetam completed- 7 days-on 1/5  -staples removed 1/8/23-healing well  -continue to hold ASA per neurosurg  -Repeat CT head 1/10-  Previously noted left-sided subdural hematoma is again identified. Decrease in size, mass effect and decreased left-to-right shift is seen.  --Needs  monitoring for impulsivity.     # Cognitive Deficits  -Cont. Namenda 5mg bid started 1/18-- no adverse effects, no GI upset, N/V  -Cont. Donepezil 5mg QD 1/20/23 --monitor for GI Sx-- none reported     #Glaucoma  -c/w home eyedrops    #HTN  -dc'd amlodipine 1/19 due to low BP will continue to monitor  -BP controlled off meds    #HLD  -c/w atorvastatin    # Sleep:  - Maintain low stim environment    # Pain Management:  - Tylenol PRN  --Left foot 2nd digit hammer toe     # GI/Bowel:  - Senna QHS, Miralax daily     # /Bladder:   - Encourage timed voids every 4 hours while awake     # Skin/Pressure Injury:  - Skin assessment on admission:  right lateral hip abrasion 5cm x 2cm likely secondary to fall at home-- apply wound gel, cavilon to periwound and cover with allvyn dressing.   - Monitor Incisions: left surgical clean incision intact staples removed 1/8    # Diet:   - Diet Consistency/Modifications: Regular diet  - Aspiration Precautions  - c/w  SLP treatment    # DVT ppx:  - lovenox  - Last Doppler on 12/29 negative for DVT    # Restrictions/Precautions:  - Precautions: falls, seizures, aspiration    IDT 1/31  Nursing- Continent bowel and bladder, supervision assist   Social work- Patient resides alone in an apartment with elevator access; independent prior,   ST- On Regular/thins; mild language deficits., mild-moderate cognitive deficits, short term memory impaired   OT- Supervision with ADL's, CG with shower transfers   PT- CS with transfers, ambulating more than 150 ft with RW, CS, 12 steps NBQC with hand-held support and CG   Goals- ambulate to bathroom with RW with supervision, appropriate call bell use, Improve problem solving and safety awareness   Dispo- covered through 1/30; Requested 2/1 for discharge home with 24hr aid         Assessment	  82y female with a history of HTN, HLD, glaucoma who presented to 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     Admitted to [facility] acute inpatient rehab on 1/4/23 with cognitive deficits, and  ADL, gait, and functional impairments.     # Traumatic SDH  - Comprehensive Multidisciplinary Rehab Program: 3 hours a day, 5 days a week with PT/OT/SLP  -s/p left [place] hole 12/29/22  --Seizure ppx:  brivaracetam completed- 7 days-on 1/5  -staples removed 1/8/23-healing well  -continue to hold ASA per neurosurg  -Repeat CT head 1/10-  Previously noted left-sided subdural hematoma is again identified. Decrease in size, mass effect and decreased left-to-right shift is seen.  --Needs  monitoring for impulsivity.     # Cognitive Deficits  -Cont. Namenda 5mg bid started 1/18-- no adverse effects, no GI upset, N/V  -Cont. Donepezil 5mg QD 1/20/23 --monitor for GI Sx-- none reported     #Glaucoma  -c/w home eyedrops    #HTN  -dc'd amlodipine 1/19 due to low BP will continue to monitor  -BP controlled off meds    #HLD  -c/w atorvastatin    # Sleep:  - Maintain low stim environment    # Pain Management:  - Tylenol PRN  --Left foot 2nd digit hammer toe     # GI/Bowel:  - Senna QHS, Miralax daily     # /Bladder:   - Encourage timed voids every 4 hours while awake     # Skin/Pressure Injury:  - Skin assessment on admission:  right lateral hip abrasion 5cm x 2cm likely secondary to fall at home-- apply wound gel, cavilon to periwound and cover with allvyn dressing.   - Monitor Incisions: left surgical clean incision intact staples removed 1/8    # Diet:   - Diet Consistency/Modifications: Regular diet  - Aspiration Precautions  - c/w  SLP treatment    # DVT ppx:  - lovenox  - Last Doppler on 12/29 negative for DVT    # Restrictions/Precautions:  - Precautions: falls, seizures, aspiration    IDT 1/31  Nursing- Continent bowel and bladder, supervision assist   Social work- Patient resides alone in an apartment with elevator access; independent prior,   ST- On Regular/thins; mild language deficits., mild-moderate cognitive deficits--working memory and  short term memory impaired   OT- Supervision with ADL's, CG with shower transfers   PT- CS with transfers, ambulating more than 150 ft with RW-- CS, 12 steps NBQC with hand-held support and CG   Goals- 1. ambulate to bathroom with RW with supervision, 2. appropriate call bell use, 3. Improve problem solving and safety awareness   Dispo- covered through 2/1; Requested 2/2 for discharge home with 24hr aid

## 2023-01-31 NOTE — PROGRESS NOTE ADULT - SUBJECTIVE AND OBJECTIVE BOX
Patient is a 82y old  Female who presents with a chief complaint of Traumatic SDH (30 Jan 2023 12:36)      Patient seen and examined at bedside.  No overnight events  No complaints this morning    ALLERGIES:  hydrochlorothiazide (Unknown)    MEDICATIONS  (STANDING):  atorvastatin 20 milliGRAM(s) Oral at bedtime  donepezil 5 milliGRAM(s) Oral daily  enoxaparin Injectable 40 milliGRAM(s) SubCutaneous <User Schedule>  latanoprost 0.005% Ophthalmic Solution 1 Drop(s) Both EYES at bedtime  memantine 5 milliGRAM(s) Oral two times a day  senna 2 Tablet(s) Oral at bedtime    MEDICATIONS  (PRN):  acetaminophen     Tablet .. 650 milliGRAM(s) Oral every 6 hours PRN Temp greater or equal to 38C (100.4F), Mild Pain (1 - 3)  polyethylene glycol 3350 17 Gram(s) Oral two times a day PRN Constipation    Vital Signs Last 24 Hrs  T(F): 97.7 (31 Jan 2023 07:55), Max: 98.6 (30 Jan 2023 20:24)  HR: 56 (31 Jan 2023 07:55) (56 - 62)  BP: 157/76 (31 Jan 2023 07:55) (115/62 - 157/76)  RR: 15 (31 Jan 2023 07:55) (15 - 15)  SpO2: 100% (31 Jan 2023 07:55) (99% - 100%)  I&O's Summary    PHYSICAL EXAM:  GENERAL: NAD  HENT:  Atraumatic, Normocephalic; No tonsillar erythema, exudates, or enlargement; Moist mucous membranes;   EYES: EOMI, PERRLA, conjunctiva and sclera clear, no lid-lag  NECK: Supple, No JVD, Normal thyroid  CHEST/LUNG: Clear to percussion bilaterally; No rales, rhonchi, wheezing, or rubs; normal respiratory effort, no intercostal retractions  HEART: Regular rate and rhythm; No murmurs, rubs, or gallops; No pitting edema  ABDOMEN: Soft, Nontender, Nondistended; Bowel sounds present; No HSM  MUSCULOSKELETAL/EXTREMITIES:  2+ Peripheral Pulses, No clubbing or digital cyanosis  PSYCH: Appropriate affect, Alert    LABS:                        11.3   5.73  )-----------( 179      ( 30 Jan 2023 06:06 )             35.7       01-30    145  |  108  |  17  ----------------------------<  96  4.8   |  31  |  0.70    Ca    9.1      30 Jan 2023 06:06    TPro  6.8  /  Alb  2.9  /  TBili  0.3  /  DBili  x   /  AST  23  /  ALT  21  /  AlkPhos  108  01-30     12-29 Chol 157 mg/dL LDL -- HDL 69 mg/dL Trig 66 mg/dL      COVID-19 PCR: Kristi (01-04-23 @ 16:15)  COVID-19 PCR: Kristi (01-03-23 @ 10:19)    Care Discussed with Rehab Attending and Other Providers

## 2023-01-31 NOTE — PROGRESS NOTE ADULT - ATTENDING COMMENTS
Pt. seen with resident & fellow.  Agree with documentation above as per fellow with amendments made as appropriate. Patient medically stable. Making progress towards rehab goals.       TBI- right SDH  stable.    Tolerating donepezil-- will trial increase to 10mg to help improve memory-- monitor for SE's--start 2/1.     d/c planning to home 2/2

## 2023-01-31 NOTE — PROGRESS NOTE ADULT - SUBJECTIVE AND OBJECTIVE BOX
SUBJECTIVE: Patient seen and examined. No acute overnight events, slept well. No other complaints.       REVIEW OF SYSTEMS  Afebrile  +Cognitive deficits  Denies CP, SOB, Abdominal pain    VITALS  82y  Vital Signs Last 24 Hrs  T(C): 36.5 (31 Jan 2023 07:55), Max: 37 (30 Jan 2023 20:24)  T(F): 97.7 (31 Jan 2023 07:55), Max: 98.6 (30 Jan 2023 20:24)  HR: 56 (31 Jan 2023 07:55) (56 - 62)  BP: 157/76 (31 Jan 2023 07:55) (115/62 - 157/76)  RR: 15 (31 Jan 2023 07:55) (15 - 15)  SpO2: 100% (31 Jan 2023 07:55) (99% - 100%)    Parameters below as of 31 Jan 2023 07:55  Patient On (Oxygen Delivery Method): room air      Daily     Daily     PHYSICAL EXAM  Constitutional - NAD, Comfortable  HEENT -left cranial surgical incision-healing well  Chest - breathing comfortably on RA  Cardio - warm and well perfused, RRR, no murmur  Abdomen -  Soft, NTND   Extr-- Left foot 2nd digit hammer toe with callus at distal tip  Neurologic Exam:                    Cognitive -             Orientation: Awake and Alert x person, place (not name of place), year- not month or date            Attention:  Impaired-- recall 1/3 objects with MC options; Able to repeat days of the week backwards, able to do simple calculations      Speech - + word finding difficulties; Fluent, Comprehensible, No dysarthria, No aphasia, repetition intact     Motor -                      LEFT    UE - ShAB 5/5, EF 5/5, EE 5/5, WE 5/5,  WNL                    RIGHT UE - ShAB 5/5, EF 5/5, EE 5/5, WE 5/5,  WNL                    LEFT    LE - HF 3/5, KE 4/5, DF 5/5, PF 5/5                    RIGHT LE - HF 5/5, KE 5/5, DF 5/5, PF 5/5     Psychiatric - Mood stable, Affect WNL    RECENT LABS:                        11.3   5.73  )-----------( 179      ( 30 Jan 2023 06:06 )             35.7     01-30    145  |  108  |  17  ----------------------------<  96  4.8   |  31  |  0.70    Ca    9.1      30 Jan 2023 06:06    TPro  6.8  /  Alb  2.9<L>  /  TBili  0.3  /  DBili  x   /  AST  23  /  ALT  21  /  AlkPhos  108  01-30    LIVER FUNCTIONS - ( 30 Jan 2023 06:06 )  Alb: 2.9 g/dL / Pro: 6.8 g/dL / ALK PHOS: 108 U/L / ALT: 21 U/L / AST: 23 U/L / GGT: x           MEDICATIONS:  MEDICATIONS  (STANDING):  atorvastatin 20 milliGRAM(s) Oral at bedtime  donepezil 5 milliGRAM(s) Oral daily  enoxaparin Injectable 40 milliGRAM(s) SubCutaneous <User Schedule>  latanoprost 0.005% Ophthalmic Solution 1 Drop(s) Both EYES at bedtime  memantine 5 milliGRAM(s) Oral two times a day  senna 2 Tablet(s) Oral at bedtime    MEDICATIONS  (PRN):  acetaminophen     Tablet .. 650 milliGRAM(s) Oral every 6 hours PRN Temp greater or equal to 38C (100.4F), Mild Pain (1 - 3)  polyethylene glycol 3350 17 Gram(s) Oral two times a day PRN Constipation     SUBJECTIVE: Patient seen and examined in the wheelchair while doing a crossword puzzle. No acute overnight events, slept well. No other complaints.       REVIEW OF SYSTEMS  Afebrile  +Cognitive deficits  Denies CP, SOB, Abdominal pain    VITALS  82y  Vital Signs Last 24 Hrs  T(C): 36.5 (31 Jan 2023 07:55), Max: 37 (30 Jan 2023 20:24)  T(F): 97.7 (31 Jan 2023 07:55), Max: 98.6 (30 Jan 2023 20:24)  HR: 56 (31 Jan 2023 07:55) (56 - 62)  BP: 157/76 (31 Jan 2023 07:55) (115/62 - 157/76)  RR: 15 (31 Jan 2023 07:55) (15 - 15)  SpO2: 100% (31 Jan 2023 07:55) (99% - 100%)    Parameters below as of 31 Jan 2023 07:55  Patient On (Oxygen Delivery Method): room air      Daily     Daily     PHYSICAL EXAM  Constitutional - NAD, Comfortable  HEENT -left cranial surgical incision-healing well  Chest - breathing comfortably on RA  Cardio - warm and well perfused, RRR, no murmur  Abdomen -  Soft, NTND   Extr-- Left foot 2nd digit hammer toe with callus at distal tip  Neurologic Exam:                    Cognitive -             Orientation: Awake and Alert x person, place (not name of place), year- not month or date            Attention:  Impaired-- recall 1/3 objects with MC options; Able to repeat days of the week backwards, able to do simple calculations      Speech - + word finding difficulties; Fluent, Comprehensible, No dysarthria, No aphasia, repetition intact     Motor -                      LEFT    UE - ShAB 5/5, EF 5/5, EE 5/5, WE 5/5,  WNL                    RIGHT UE - ShAB 5/5, EF 5/5, EE 5/5, WE 5/5,  WNL                    LEFT    LE - HF 3/5, KE 4/5, DF 5/5, PF 5/5                    RIGHT LE - HF 5/5, KE 5/5, DF 5/5, PF 5/5     Psychiatric - Mood stable, Affect WNL    RECENT LABS:                        11.3   5.73  )-----------( 179      ( 30 Jan 2023 06:06 )             35.7     01-30    145  |  108  |  17  ----------------------------<  96  4.8   |  31  |  0.70    Ca    9.1      30 Jan 2023 06:06    TPro  6.8  /  Alb  2.9<L>  /  TBili  0.3  /  DBili  x   /  AST  23  /  ALT  21  /  AlkPhos  108  01-30    LIVER FUNCTIONS - ( 30 Jan 2023 06:06 )  Alb: 2.9 g/dL / Pro: 6.8 g/dL / ALK PHOS: 108 U/L / ALT: 21 U/L / AST: 23 U/L / GGT: x           MEDICATIONS:  MEDICATIONS  (STANDING):  atorvastatin 20 milliGRAM(s) Oral at bedtime  donepezil 5 milliGRAM(s) Oral daily  enoxaparin Injectable 40 milliGRAM(s) SubCutaneous <User Schedule>  latanoprost 0.005% Ophthalmic Solution 1 Drop(s) Both EYES at bedtime  memantine 5 milliGRAM(s) Oral two times a day  senna 2 Tablet(s) Oral at bedtime    MEDICATIONS  (PRN):  acetaminophen     Tablet .. 650 milliGRAM(s) Oral every 6 hours PRN Temp greater or equal to 38C (100.4F), Mild Pain (1 - 3)  polyethylene glycol 3350 17 Gram(s) Oral two times a day PRN Constipation     Yes

## 2023-01-31 NOTE — PROGRESS NOTE ADULT - ASSESSMENT
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     #LE edema  -ULT on 12/29 negative for DVT  -c/w compression stockings  -Continue Lovenox DVT ppx    #Traumatic SDH  -s/p left Francis hole 12/29/22  -s/p brivaracetam  -continue to hold ASA per neurosurg  -continue donepezil and memantine  -rehab per primary team    #HLD  -continue atorvastatin    #Normocytic anemia  -Stable H/H  -Continue to monitor H/H    #Rhabdomyolysis   -Resolved    #HTN  -Hold amlodipine since BP appropriate    #Glaucoma  -continue latanoprost     # DVT ppx  -lovenox

## 2023-02-01 PROCEDURE — 99232 SBSQ HOSP IP/OBS MODERATE 35: CPT

## 2023-02-01 RX ORDER — SENNA PLUS 8.6 MG/1
2 TABLET ORAL
Qty: 0 | Refills: 0 | DISCHARGE
Start: 2023-02-01

## 2023-02-01 RX ORDER — MEMANTINE HYDROCHLORIDE 10 MG/1
1 TABLET ORAL
Qty: 0 | Refills: 0 | DISCHARGE
Start: 2023-02-01

## 2023-02-01 RX ORDER — POLYETHYLENE GLYCOL 3350 17 G/17G
17 POWDER, FOR SOLUTION ORAL
Qty: 0 | Refills: 0 | DISCHARGE
Start: 2023-02-01

## 2023-02-01 RX ORDER — LATANOPROST 0.05 MG/ML
1 SOLUTION/ DROPS OPHTHALMIC; TOPICAL
Qty: 0 | Refills: 0 | DISCHARGE
Start: 2023-02-01

## 2023-02-01 RX ORDER — MEMANTINE HYDROCHLORIDE 10 MG/1
1 TABLET ORAL
Qty: 60 | Refills: 0
Start: 2023-02-01 | End: 2023-03-02

## 2023-02-01 RX ORDER — ACETAMINOPHEN 500 MG
2 TABLET ORAL
Qty: 0 | Refills: 0 | DISCHARGE
Start: 2023-02-01

## 2023-02-01 RX ORDER — DONEPEZIL HYDROCHLORIDE 10 MG/1
1 TABLET, FILM COATED ORAL
Qty: 30 | Refills: 0
Start: 2023-02-01 | End: 2023-03-02

## 2023-02-01 RX ORDER — ATORVASTATIN CALCIUM 80 MG/1
1 TABLET, FILM COATED ORAL
Qty: 0 | Refills: 0 | DISCHARGE
Start: 2023-02-01

## 2023-02-01 RX ORDER — BRIVARACETAM 25 MG/1
1 TABLET, FILM COATED ORAL
Qty: 0 | Refills: 0 | DISCHARGE

## 2023-02-01 RX ORDER — ATORVASTATIN CALCIUM 80 MG/1
1 TABLET, FILM COATED ORAL
Qty: 30 | Refills: 0
Start: 2023-02-01 | End: 2023-03-02

## 2023-02-01 RX ADMIN — SENNA PLUS 2 TABLET(S): 8.6 TABLET ORAL at 21:08

## 2023-02-01 RX ADMIN — ATORVASTATIN CALCIUM 20 MILLIGRAM(S): 80 TABLET, FILM COATED ORAL at 21:08

## 2023-02-01 RX ADMIN — MEMANTINE HYDROCHLORIDE 5 MILLIGRAM(S): 10 TABLET ORAL at 17:20

## 2023-02-01 RX ADMIN — LATANOPROST 1 DROP(S): 0.05 SOLUTION/ DROPS OPHTHALMIC; TOPICAL at 21:08

## 2023-02-01 RX ADMIN — ENOXAPARIN SODIUM 40 MILLIGRAM(S): 100 INJECTION SUBCUTANEOUS at 17:20

## 2023-02-01 RX ADMIN — MEMANTINE HYDROCHLORIDE 5 MILLIGRAM(S): 10 TABLET ORAL at 05:09

## 2023-02-01 RX ADMIN — DONEPEZIL HYDROCHLORIDE 10 MILLIGRAM(S): 10 TABLET, FILM COATED ORAL at 12:08

## 2023-02-01 NOTE — PROGRESS NOTE ADULT - SUBJECTIVE AND OBJECTIVE BOX
Subjective- Patient seen and examined. No acute overnight events, slept well. No other complaints, doing well, eager to be going home       REVIEW OF SYSTEMS  Denies CP, SOB, Abdominal pain, N/V    VITALS  82y  Vital Signs Last 24 Hrs  T(C): 36.8 (01 Feb 2023 07:16), Max: 36.8 (31 Jan 2023 19:36)  T(F): 98.3 (01 Feb 2023 07:16), Max: 98.3 (01 Feb 2023 07:16)  HR: 60 (01 Feb 2023 07:16) (60 - 71)  BP: 100/65 (01 Feb 2023 07:16) (100/65 - 127/60)  BP(mean): --  RR: 15 (01 Feb 2023 07:16) (14 - 15)  SpO2: 98% (01 Feb 2023 07:16) (98% - 99%)    Parameters below as of 01 Feb 2023 07:16  Patient On (Oxygen Delivery Method): room air    PHYSICAL EXAM  Constitutional - NAD, Comfortable  HEENT -left cranial surgical incision-healing well  Chest - breathing comfortably on RA  Cardio - warm and well perfused, RRR, no murmur  Abdomen -  Soft, NTND   Extr-- Left foot 2nd digit hammer toe with callus at distal tip  Neurologic Exam:                    Cognitive -             Orientation: Awake and Alert x person, place (not name of place), year- not month or date            Attention:  Impaired-- recall 1/3 objects with MC options; Able to repeat days of the week backwards, able to do simple calculations      Speech - + word finding difficulties; Fluent, Comprehensible, No dysarthria, No aphasia, repetition intact     Motor -                      LEFT    UE - ShAB 5/5, EF 5/5, EE 5/5, WE 5/5,  WNL                    RIGHT UE - ShAB 5/5, EF 5/5, EE 5/5, WE 5/5,  WNL                    LEFT    LE - HF 3/5, KE 4/5, DF 5/5, PF 5/5                    RIGHT LE - HF 5/5, KE 5/5, DF 5/5, PF 5/5     Psychiatric - Mood stable, Affect WNL    MEDICATIONS:  MEDICATIONS  (STANDING):  atorvastatin 20 milliGRAM(s) Oral at bedtime  donepezil 10 milliGRAM(s) Oral daily  enoxaparin Injectable 40 milliGRAM(s) SubCutaneous <User Schedule>  latanoprost 0.005% Ophthalmic Solution 1 Drop(s) Both EYES at bedtime  memantine 5 milliGRAM(s) Oral two times a day  senna 2 Tablet(s) Oral at bedtime    MEDICATIONS  (PRN):  acetaminophen     Tablet .. 650 milliGRAM(s) Oral every 6 hours PRN Temp greater or equal to 38C (100.4F), Mild Pain (1 - 3)  polyethylene glycol 3350 17 Gram(s) Oral two times a day PRN Constipation

## 2023-02-01 NOTE — PROGRESS NOTE ADULT - ATTENDING COMMENTS
Pt. seen with fellow.  Agree with documentation above as per fellow with amendments made as appropriate. Patient medically stable. Making progress towards rehab goals.     left SDH  -Donepezil increased to 10 mg po (2/1)- for memory and attention-- seemed to tolerate well w/o SE's.    -- d/c planning to home tomorrow.

## 2023-02-01 NOTE — PROGRESS NOTE ADULT - ASSESSMENT
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     Admitted to [place] acute inpatient rehab on 1/4/23 with cognitive deficits, and  ADL, gait, and functional impairments.     # Traumatic SDH  - Comprehensive Multidisciplinary Rehab Program: 3 hours a day, 5 days a week with PT/OT/SLP  -s/p left [place] hole 12/29/22  --Seizure ppx:  brivaracetam completed- 7 days-on 1/5  -staples removed 1/8/23-healing well  -continue to hold ASA per neurosurg  -Repeat CT head 1/10-  Previously noted left-sided subdural hematoma is again identified. Decrease in size, mass effect and decreased left-to-right shift is seen.  --Needs  monitoring for impulsivity.     # Cognitive Deficits  -Cont. Namenda 5mg bid started 1/18-- no adverse effects, no GI upset, N/V  -Donepezil increased to 10 mg po (2/1)--monitor for adverse effects-- GI symptoms     #Glaucoma  -c/w home eyedrops    #HTN  -dc'd amlodipine 1/19 due to low BP will continue to monitor  -BP controlled off meds    #HLD  -c/w atorvastatin    # Sleep:  - Maintain low stim environment    # Pain Management:  - Tylenol PRN  --Left foot 2nd digit hammer toe     # GI/Bowel:  - Senna QHS, Miralax daily     # /Bladder:   - Encourage timed voids every 4 hours while awake     # Skin/Pressure Injury:  - Skin assessment on admission:  right lateral hip abrasion 5cm x 2cm likely secondary to fall at home-- apply wound gel, cavilon to periwound and cover with allvyn dressing.   - Monitor Incisions: left surgical clean incision intact staples removed 1/8    # Diet:   - Diet Consistency/Modifications: Regular diet  - Aspiration Precautions  - c/w  SLP treatment    # DVT ppx:  - lovenox  - Last Doppler on 12/29 negative for DVT    # Restrictions/Precautions:  - Precautions: falls, seizures, aspiration    IDT 1/31  Nursing- Continent bowel and bladder, supervision assist   Social work- Patient resides alone in an apartment with elevator access; independent prior,   ST- On Regular/thins; mild language deficits., mild-moderate cognitive deficits--working memory and  short term memory impaired   OT- Supervision with ADL's, CG with shower transfers   PT- CS with transfers, ambulating more than 150 ft with RW-- CS, 12 steps NBQC with hand-held support and CG   Goals- 1. ambulate to bathroom with RW with supervision, 2. appropriate call bell use, 3. Improve problem solving and safety awareness   Dispo- covered through 2/1; Requested 2/2 for discharge home with 24hr aid

## 2023-02-01 NOTE — CHART NOTE - NSCHARTNOTEFT_GEN_A_CORE
Nutrition Follow Up Note  Hospital Course   (Per Electronic Medical Record)    Source:  Patient [X]  Medical Record [X]      Diet: Diet, Regular:   Supplement Feeding Modality:  Oral  Ensure Plus High Protein Cans or Servings Per Day:  1       Frequency:  Daily (01-05-23 @ 13:42) [Active]    At this time patient w/ adequate appetite/intake consuming % of meals. Reviewed preferences and menu alternatives. Likes Strawberry and Chocolate Ensure. No issues w/ chewing and swallowing. Denies N/V/C/D, last BM 1/30 Per nursing flowsheets.      Enteral/Parenteral Nutrition: Not Applicable    Current Weight: 151.8 lb on 1/29    Pertinent Medications: MEDICATIONS  (STANDING):  atorvastatin 20 milliGRAM(s) Oral at bedtime  donepezil 10 milliGRAM(s) Oral daily  enoxaparin Injectable 40 milliGRAM(s) SubCutaneous <User Schedule>  latanoprost 0.005% Ophthalmic Solution 1 Drop(s) Both EYES at bedtime  memantine 5 milliGRAM(s) Oral two times a day  senna 2 Tablet(s) Oral at bedtime    MEDICATIONS  (PRN):  acetaminophen     Tablet .. 650 milliGRAM(s) Oral every 6 hours PRN Temp greater or equal to 38C (100.4F), Mild Pain (1 - 3)  polyethylene glycol 3350 17 Gram(s) Oral two times a day PRN Constipation      Pertinent Labs:  01-30 Na145 mmol/L Glu 96 mg/dL K+ 4.8 mmol/L Cr  0.70 mg/dL BUN 17 mg/dL 01-30 Alb 2.9 g/dL<L>      Skin: No pressure injury per nursing flowsheets    Edema: No edema noted per nursing flowsheet    Last Bowel Movement: on 1/30 Per nursing flowsheets     Estimated Needs:   [X] No Change Since Previous Assessment    Previous Nutrition Diagnosis:   Moderate Malnutrition    Nutrition Diagnosis is [X] Ongoing - Addressed W/ Ensure Plus High Protein 8oz PO Daily (Provides 350kcal & 20grams of Protein) & PO intake >75% at meal time       New Nutrition Diagnosis: [X] Not Applicable    Interventions:   1. Recommend Continue Nutrition Plan of Care.    Monitoring & Evaluation:   [X] Weights   [X] PO Intake   [X] Skin Integrity   [X] Follow Up (Per Protocol)  [X] Tolerance to Diet Prescription   [X] Other: Labs     Registered Dietitian/Nutritionist Remains Available.  Britta Vieira RD, MS, CDN    Phone# [phone]
Nutrition Follow Up Note  Hospital Course   (Per Electronic Medical Record)    Source:  Patient [X]  Medical Record [X]      Diet: Diet, Regular:   Supplement Feeding Modality:  Oral  Ensure Plus High Protein Cans or Servings Per Day:  1       Frequency:  Daily (01-05-23 @ 13:42) [Active]    Patient w/ adequate appetite/intake at this time consuming % of meal tray. No issues w/ chewing and swallowing. Denies N/V/C/D, last BM 1/24 Per nursing flowsheets.     Enteral/Parenteral Nutrition: Not Applicable    Current Weight: 151.8 lb on 1/6    Pertinent Medications: MEDICATIONS  (STANDING):  atorvastatin 20 milliGRAM(s) Oral at bedtime  donepezil 5 milliGRAM(s) Oral daily  enoxaparin Injectable 40 milliGRAM(s) SubCutaneous <User Schedule>  latanoprost 0.005% Ophthalmic Solution 1 Drop(s) Both EYES at bedtime  memantine 5 milliGRAM(s) Oral two times a day  senna 2 Tablet(s) Oral at bedtime    MEDICATIONS  (PRN):  acetaminophen     Tablet .. 650 milliGRAM(s) Oral every 6 hours PRN Temp greater or equal to 38C (100.4F), Mild Pain (1 - 3)  polyethylene glycol 3350 17 Gram(s) Oral two times a day PRN Constipation      Pertinent Labs:  01-23 Na142 mmol/L Glu 98 mg/dL K+ 3.9 mmol/L Cr  0.64 mg/dL BUN 17 mg/dL 01-23 Alb 2.9 g/dL<L> 12-29 Chol 157 mg/dL LDL --    HDL 69 mg/dL Trig 66 mg/dL    Skin: Surgical Incision, Site Left Head, wound left thigh    Edema: No edema noted per nursing flowsheet    Last Bowel Movement: on 1/24 Per nursing flowsheets     Estimated Needs:   [X] No Change Since Previous Assessment    Previous Nutrition Diagnosis:   Moderate Malnutrition    Nutrition Diagnosis is [X] Ongoing - Addressed W/ Ensure Plus High Protein 8oz PO Daily (Provides 350kcal & 20grams of Protein) & PO intake >75% at meal time     New Nutrition Diagnosis: [X] Not Applicable    Interventions:   1. Recommend Continue Nutrition Plan of Care.    Monitoring & Evaluation:   [X] Weights   [X] PO Intake   [X] Skin Integrity   [X] Follow Up (Per Protocol)  [X] Tolerance to Diet Prescription   [X] Other: Labs     Registered Dietitian/Nutritionist Remains Available.  Britta iVeira RD, MS, CDN    Phone# [phone]
Nutrition Follow Up Note  Hospital Course   (Per Electronic Medical Record)    Source:  Patient [X]  Nursing Staff [X]   Medical Record [X]      Diet: Diet, Regular:   Supplement Feeding Modality:  Oral  Ensure Plus High Protein Cans or Servings Per Day:  1       Frequency:  Daily (23 @ 13:42) [Active]    Nutrition Follow Up: Patient with adequate PO intakes at this time, consuming % of meals but trending on the higher side. Receiving Ensure Plus High Protein 8oz PO Daily (Provides 350kcal & 20grams of Protein). Recommend continue current nutrition plan of care as tolerated.    Enteral/Parenteral Nutrition: Not Applicable    Weight Trends:  Weight in k.9 (2023 22:39)  Weight in k.9 (2023 22:28)  Weight in k.9 (2023 23:14)      Skin: No pressure injuries    Edema: No edema noted    Last Bowel Movement: 23 noted    Pertinent Medications: MEDICATIONS  (STANDING):  amLODIPine   Tablet 2.5 milliGRAM(s) Oral daily  atorvastatin 20 milliGRAM(s) Oral at bedtime  enoxaparin Injectable 40 milliGRAM(s) SubCutaneous <User Schedule>  latanoprost 0.005% Ophthalmic Solution 1 Drop(s) Both EYES at bedtime    MEDICATIONS  (PRN):  acetaminophen     Tablet .. 650 milliGRAM(s) Oral every 6 hours PRN Temp greater or equal to 38C (100.4F), Mild Pain (1 - 3)  polyethylene glycol 3350 17 Gram(s) Oral two times a day PRN Constipation      Pertinent Labs:   Na146 mmol/L<H> Glu 84 mg/dL K+ 3.8 mmol/L Cr  0.61 mg/dL BUN 12 mg/dL  Alb 2.1 g/dL<L> 12-29 Chol 157 mg/dL LDL --    HDL 69 mg/dL Trig 66 mg/dL        Estimated Needs:   [X] No Change Since Previous Assessment    Previous Nutrition Diagnosis:   Moderate Malnutrition    Nutrition Diagnosis is [X] Ongoing - Adressed W/ Ensure Plus High Protein 8oz PO Daily (Provides 350kcal & 20grams of Protein)     Interventions:   1. Recommend continue current nutrition plan of care as tolerated     Monitoring & Evaluation:   [X] Weights   [X] PO Intake   [X] Skin Integrity   [X] Follow Up (Per Protocol)  [X] Tolerance to Diet Prescription   [X] Other: Labs & POCT    Registered Dietitian/Nutritionist Remains Available.  Osmin Burnham RD, CDN    Phone# [phone]
Nutrition Follow Up Note  Hospital Course   (Per Electronic Medical Record)    Source:  Patient [X]  Medical Record [X]      Diet: Diet, Regular:   Supplement Feeding Modality:  Oral  Ensure Plus High Protein Cans or Servings Per Day:  1       Frequency:  Daily (01-05-23 @ 13:42) [Active]    Patient w/ adequate appetite/intake at this time consuming % of meal tray. No issues w/ chewing and swallowing. Denies N/V/C/D, last BM 1/15 Per nursing flowsheets .    Enteral/Parenteral Nutrition: Not Applicable    Current Weight: 151.8lb on 1/6  Recommend obtain weight    Pertinent Medications: MEDICATIONS  (STANDING):  amLODIPine   Tablet 2.5 milliGRAM(s) Oral daily  atorvastatin 20 milliGRAM(s) Oral at bedtime  enoxaparin Injectable 40 milliGRAM(s) SubCutaneous <User Schedule>  latanoprost 0.005% Ophthalmic Solution 1 Drop(s) Both EYES at bedtime  memantine 5 milliGRAM(s) Oral two times a day    MEDICATIONS  (PRN):  acetaminophen     Tablet .. 650 milliGRAM(s) Oral every 6 hours PRN Temp greater or equal to 38C (100.4F), Mild Pain (1 - 3)  polyethylene glycol 3350 17 Gram(s) Oral two times a day PRN Constipation      Pertinent Labs:  01-17 Na143 mmol/L Glu 82 mg/dL K+ 4.1 mmol/L Cr  0.71 mg/dL BUN 13 mg/dL 01-17 Alb 2.4 g/dL<L> 12-29 Chol 157 mg/dL LDL --    HDL 69 mg/dL Trig 66 mg/dL      Skin: No pressure injury per nursing flowsheets    Edema: No edema noted per nursing flowsheet    Last Bowel Movement: on 1/15 Per nursing flowsheets     Estimated Needs:   [X] No Change Since Previous Assessment    Previous Nutrition Diagnosis:   Moderate Malnutrition    Nutrition Diagnosis is [X] Ongoing - Adressed W/ Ensure Plus High Protein 8oz PO Daily (Provides 350kcal & 20grams of Protein) & PO intake >75% at meal time       New Nutrition Diagnosis: [X] Not Applicable    Interventions:   1. Recommend Continue Nutrition Plan of Care.    Monitoring & Evaluation:   [X] Weights   [X] PO Intake   [X] Skin Integrity   [X] Follow Up (Per Protocol)  [X] Tolerance to Diet Prescription   [X] Other: Labs     Registered Dietitian/Nutritionist Remains Available.  Britta Vieira RD, MS, CDN    Phone# [phone]

## 2023-02-02 ENCOUNTER — TRANSCRIPTION ENCOUNTER (OUTPATIENT)
Age: 83
End: 2023-02-02

## 2023-02-02 VITALS
TEMPERATURE: 98 F | RESPIRATION RATE: 15 BRPM | SYSTOLIC BLOOD PRESSURE: 147 MMHG | DIASTOLIC BLOOD PRESSURE: 78 MMHG | OXYGEN SATURATION: 96 % | HEART RATE: 72 BPM

## 2023-02-02 DIAGNOSIS — H40.9 UNSPECIFIED GLAUCOMA: ICD-10-CM

## 2023-02-02 LAB
ALBUMIN SERPL ELPH-MCNC: 2.9 G/DL — LOW (ref 3.3–5)
ALP SERPL-CCNC: 119 U/L — SIGNIFICANT CHANGE UP (ref 40–120)
ALT FLD-CCNC: 25 U/L — SIGNIFICANT CHANGE UP (ref 10–45)
ANION GAP SERPL CALC-SCNC: 8 MMOL/L — SIGNIFICANT CHANGE UP (ref 5–17)
AST SERPL-CCNC: 26 U/L — SIGNIFICANT CHANGE UP (ref 10–40)
BILIRUB SERPL-MCNC: 0.3 MG/DL — SIGNIFICANT CHANGE UP (ref 0.2–1.2)
BUN SERPL-MCNC: 18 MG/DL — SIGNIFICANT CHANGE UP (ref 7–23)
CALCIUM SERPL-MCNC: 9 MG/DL — SIGNIFICANT CHANGE UP (ref 8.4–10.5)
CHLORIDE SERPL-SCNC: 107 MMOL/L — SIGNIFICANT CHANGE UP (ref 96–108)
CO2 SERPL-SCNC: 31 MMOL/L — SIGNIFICANT CHANGE UP (ref 22–31)
CREAT SERPL-MCNC: 0.69 MG/DL — SIGNIFICANT CHANGE UP (ref 0.5–1.3)
EGFR: 87 ML/MIN/1.73M2 — SIGNIFICANT CHANGE UP
GLUCOSE SERPL-MCNC: 96 MG/DL — SIGNIFICANT CHANGE UP (ref 70–99)
HCT VFR BLD CALC: 38.8 % — SIGNIFICANT CHANGE UP (ref 34.5–45)
HGB BLD-MCNC: 11.8 G/DL — SIGNIFICANT CHANGE UP (ref 11.5–15.5)
MCHC RBC-ENTMCNC: 25.8 PG — LOW (ref 27–34)
MCHC RBC-ENTMCNC: 30.4 GM/DL — LOW (ref 32–36)
MCV RBC AUTO: 84.7 FL — SIGNIFICANT CHANGE UP (ref 80–100)
NRBC # BLD: 0 /100 WBCS — SIGNIFICANT CHANGE UP (ref 0–0)
PLATELET # BLD AUTO: 180 K/UL — SIGNIFICANT CHANGE UP (ref 150–400)
POTASSIUM SERPL-MCNC: 4.6 MMOL/L — SIGNIFICANT CHANGE UP (ref 3.5–5.3)
POTASSIUM SERPL-SCNC: 4.6 MMOL/L — SIGNIFICANT CHANGE UP (ref 3.5–5.3)
PROT SERPL-MCNC: 6.9 G/DL — SIGNIFICANT CHANGE UP (ref 6–8.3)
RBC # BLD: 4.58 M/UL — SIGNIFICANT CHANGE UP (ref 3.8–5.2)
RBC # FLD: 15.7 % — HIGH (ref 10.3–14.5)
SODIUM SERPL-SCNC: 146 MMOL/L — HIGH (ref 135–145)
WBC # BLD: 5.91 K/UL — SIGNIFICANT CHANGE UP (ref 3.8–10.5)
WBC # FLD AUTO: 5.91 K/UL — SIGNIFICANT CHANGE UP (ref 3.8–10.5)

## 2023-02-02 PROCEDURE — 97116 GAIT TRAINING THERAPY: CPT

## 2023-02-02 PROCEDURE — 97110 THERAPEUTIC EXERCISES: CPT

## 2023-02-02 PROCEDURE — 92610 EVALUATE SWALLOWING FUNCTION: CPT

## 2023-02-02 PROCEDURE — 97530 THERAPEUTIC ACTIVITIES: CPT

## 2023-02-02 PROCEDURE — 83540 ASSAY OF IRON: CPT

## 2023-02-02 PROCEDURE — 82607 VITAMIN B-12: CPT

## 2023-02-02 PROCEDURE — 97163 PT EVAL HIGH COMPLEX 45 MIN: CPT

## 2023-02-02 PROCEDURE — 99232 SBSQ HOSP IP/OBS MODERATE 35: CPT

## 2023-02-02 PROCEDURE — 97167 OT EVAL HIGH COMPLEX 60 MIN: CPT

## 2023-02-02 PROCEDURE — 84439 ASSAY OF FREE THYROXINE: CPT

## 2023-02-02 PROCEDURE — 70450 CT HEAD/BRAIN W/O DYE: CPT

## 2023-02-02 PROCEDURE — 36415 COLL VENOUS BLD VENIPUNCTURE: CPT

## 2023-02-02 PROCEDURE — 99366 TEAM CONF W/PAT BY HC PROF: CPT

## 2023-02-02 PROCEDURE — 83550 IRON BINDING TEST: CPT

## 2023-02-02 PROCEDURE — 97129 THER IVNTJ 1ST 15 MIN: CPT

## 2023-02-02 PROCEDURE — 82550 ASSAY OF CK (CPK): CPT

## 2023-02-02 PROCEDURE — 92523 SPEECH SOUND LANG COMPREHEN: CPT

## 2023-02-02 PROCEDURE — 82533 TOTAL CORTISOL: CPT

## 2023-02-02 PROCEDURE — 87635 SARS-COV-2 COVID-19 AMP PRB: CPT

## 2023-02-02 PROCEDURE — 97112 NEUROMUSCULAR REEDUCATION: CPT

## 2023-02-02 PROCEDURE — 85025 COMPLETE CBC W/AUTO DIFF WBC: CPT

## 2023-02-02 PROCEDURE — 80053 COMPREHEN METABOLIC PANEL: CPT

## 2023-02-02 PROCEDURE — 92507 TX SP LANG VOICE COMM INDIV: CPT

## 2023-02-02 PROCEDURE — 99238 HOSP IP/OBS DSCHRG MGMT 30/<: CPT

## 2023-02-02 PROCEDURE — 85027 COMPLETE CBC AUTOMATED: CPT

## 2023-02-02 PROCEDURE — 82746 ASSAY OF FOLIC ACID SERUM: CPT

## 2023-02-02 PROCEDURE — 97535 SELF CARE MNGMENT TRAINING: CPT

## 2023-02-02 PROCEDURE — 84443 ASSAY THYROID STIM HORMONE: CPT

## 2023-02-02 RX ADMIN — MEMANTINE HYDROCHLORIDE 5 MILLIGRAM(S): 10 TABLET ORAL at 17:09

## 2023-02-02 RX ADMIN — MEMANTINE HYDROCHLORIDE 5 MILLIGRAM(S): 10 TABLET ORAL at 04:57

## 2023-02-02 RX ADMIN — DONEPEZIL HYDROCHLORIDE 10 MILLIGRAM(S): 10 TABLET, FILM COATED ORAL at 11:35

## 2023-02-02 NOTE — PROGRESS NOTE ADULT - SUBJECTIVE AND OBJECTIVE BOX
Patient is a 82y old  Female who presents with a chief complaint of Traumatic SDH (01 Feb 2023 13:24)      Patient seen and examined at bedside. No events overnight. Patient without acute complaints at this time, very pleasant     ALLERGIES:  hydrochlorothiazide (Unknown)    MEDICATIONS  (STANDING):  atorvastatin 20 milliGRAM(s) Oral at bedtime  donepezil 10 milliGRAM(s) Oral daily  enoxaparin Injectable 40 milliGRAM(s) SubCutaneous <User Schedule>  latanoprost 0.005% Ophthalmic Solution 1 Drop(s) Both EYES at bedtime  memantine 5 milliGRAM(s) Oral two times a day  senna 2 Tablet(s) Oral at bedtime    MEDICATIONS  (PRN):  acetaminophen     Tablet .. 650 milliGRAM(s) Oral every 6 hours PRN Temp greater or equal to 38C (100.4F), Mild Pain (1 - 3)  polyethylene glycol 3350 17 Gram(s) Oral two times a day PRN Constipation    Vital Signs Last 24 Hrs  T(F): 98.3 (02 Feb 2023 07:15), Max: 98.9 (01 Feb 2023 19:26)  HR: 72 (02 Feb 2023 07:15) (71 - 72)  BP: 147/78 (02 Feb 2023 07:15) (112/68 - 147/78)  RR: 15 (02 Feb 2023 07:15) (14 - 15)  SpO2: 96% (02 Feb 2023 07:15) (96% - 98%)  I&O's Summary      PHYSICAL EXAM:  GENERAL: NAD, laying in bed  EYES: PEERL, conjunctiva and sclera clear  CHEST/LUNG: Clear to auscultation bilaterally, good air entry, non-labored breathing  HEART: RRR; S1/S2, No murmur  ABDOMEN: Soft, Nontender, Nondistended; Bowel sounds present  EXTREMITIES: No calf tenderness, No cyanosis, No edema  SKIN: Warm, Intact  PSYCH: Normal mood, Normal affect    LABS:                        11.8   5.91  )-----------( 180      ( 02 Feb 2023 06:25 )             38.8     02-02    146  |  107  |  18  ----------------------------<  96  4.6   |  31  |  0.69    Ca    9.0      02 Feb 2023 06:25    TPro  6.9  /  Alb  2.9  /  TBili  0.3  /  DBili  x   /  AST  26  /  ALT  25  /  AlkPhos  119  02-02 12-29 Chol 157 mg/dL LDL -- HDL 69 mg/dL Trig 66 mg/dL                      COVID-19 PCR: NotDetec (01-04-23 @ 16:15)      RADIOLOGY & ADDITIONAL TESTS:    Care Discussed with Consultants/Other Providers:

## 2023-02-02 NOTE — PROGRESS NOTE ADULT - REASON FOR ADMISSION
Traumatic SDH

## 2023-02-02 NOTE — DISCHARGE NOTE NURSING/CASE MANAGEMENT/SOCIAL WORK - PATIENT PORTAL LINK FT
You can access the FollowMyHealth Patient Portal offered by [facility] by registering at the following website: [email or web address]. By joining [facility]’s FollowMyHealth portal, you will also be able to view your health information using other applications (apps) compatible with our system.

## 2023-02-02 NOTE — PROGRESS NOTE ADULT - NUTRITIONAL ASSESSMENT
This patient has been assessed with a concern for Malnutrition and has been determined to have a diagnosis/diagnoses of Moderate protein-calorie malnutrition.    This patient is being managed with:   Diet Regular-  Supplement Feeding Modality:  Oral  Ensure Plus High Protein Cans or Servings Per Day:  1       Frequency:  Daily  Entered: Jan 5 2023  1:42PM    

## 2023-02-02 NOTE — PROGRESS NOTE ADULT - ATTENDING COMMENTS
Pt. seen with resident.  Agree with documentation above as per resident with amendments made as appropriate. Patient medically stable. Making progress towards rehab goals.     TBI--left SDH  Patient medically stable for discharge to home today.  Discharge medications and instructions reviewed with patient 's daughter by Dr. Marina.  All questions answered.

## 2023-02-02 NOTE — PROGRESS NOTE ADULT - ASSESSMENT
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     Admitted to [place] acute inpatient rehab on 1/4/23 with cognitive deficits, and  ADL, gait, and functional impairments.     # Traumatic SDH  - Comprehensive Multidisciplinary Rehab Program: 3 hours a day, 5 days a week with PT/OT/SLP  -s/p left [place] hole 12/29/22  --Seizure ppx:  brivaracetam completed- 7 days-on 1/5  -staples removed 1/8/23-healing well  -continue to hold ASA per neurosurg till outpatient follow-up  -Repeat CT head 1/10-  Previously noted left-sided subdural hematoma is again identified. Decrease in size, mass effect and decreased left-to-right shift is seen.  --Needs  monitoring for impulsivity.     # Cognitive Deficits  -Cont. Namenda 5mg bid started 1/18-- no adverse effects, no GI upset, N/V  -c/w Donepezil increased to 10 mg po (increased 2/1)-- no adverse effects, no GI upset, N/V    #Glaucoma  -c/w home eyedrops    #HTN  -dc'd amlodipine 1/19 due to low BP  -BP controlled off meds    #HLD  -c/w atorvastatin    # Sleep:  - Maintain low stim environment    # Pain Management:  - Tylenol PRN  --Left foot 2nd digit hammer toe     # GI/Bowel:  - Senna QHS, Miralax daily     # /Bladder:   - voiding independently    # Skin/Pressure Injury:  - Skin assessment on admission:  right lateral hip abrasion 5cm x 2cm likely secondary to fall at home-- apply wound gel, cavilon to periwound and cover with allvyn dressing  --healing  - Monitor Incisions: left surgical clean incision intact staples removed 1/8    # Diet:   - Diet Consistency/Modifications: Regular diet      # DVT ppx:  - lovenox   - Last Doppler on 12/29 negative for DVT    # Restrictions/Precautions:  - Precautions: falls, seizures, aspiration    IDT 1/31  Nursing- Continent bowel and bladder, supervision assist   Social work- Patient resides alone in an apartment with elevator access; independent prior,   ST- On Regular/thins; mild language deficits., mild-moderate cognitive deficits--working memory and  short term memory impaired   OT- Supervision with ADL's, CG with shower transfers   PT- CS with transfers, ambulating more than 150 ft with RW-- CS, 12 steps NBQC with hand-held support and CG   Goals- 1. ambulate to bathroom with RW with supervision, 2. appropriate call bell use, 3. Improve problem solving and safety awareness   Dispo- 2/2 to Home         Assessment	  82y female with a history of HTN, HLD, glaucoma who presented to 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     Admitted to [place] acute inpatient rehab on 1/4/23 with cognitive deficits, and  ADL, gait, and functional impairments.     # Traumatic SDH  - Comprehensive Multidisciplinary Rehab Program: 3 hours a day, 5 days a week with PT/OT/SLP  -s/p left [place] hole 12/29/22  --Seizure ppx:  brivaracetam completed- 7 days-on 1/5  -staples removed 1/8/23-healing well  -continue to hold ASA per neurosurg till outpatient follow-up  -Repeat CT head 1/10-  Previously noted left-sided subdural hematoma is again identified. Decrease in size, mass effect and decreased left-to-right shift is seen.  --Needs  monitoring for impulsivity.     # Cognitive Deficits  -Cont. Namenda 5mg bid started 1/18-- no adverse effects, no GI upset, N/V  -c/w Donepezil increased to 10 mg po (increased 2/1)-- no adverse effects, no GI upset, N/V    #Glaucoma  -c/w home eyedrops    #HTN  -dc'd amlodipine 1/19 due to low BP  -BP controlled off meds    #HLD  -c/w atorvastatin    # Sleep:  - Maintain low stim environment    # Pain Management:  - Tylenol PRN  --Left foot 2nd digit hammer toe     # GI/Bowel:  - Senna QHS, Miralax daily     # /Bladder:   - voiding independently        # Diet:   - Diet Consistency/Modifications: Regular diet      # DVT ppx:  - lovenox   - Last Doppler on 12/29 negative for DVT    # Restrictions/Precautions:  - Precautions: falls, seizures, aspiration    IDT 1/31  Nursing- Continent bowel and bladder, supervision assist   Social work- Patient resides alone in an apartment with elevator access; independent prior,   ST- On Regular/thins; mild language deficits., mild-moderate cognitive deficits--working memory and  short term memory impaired   OT- Supervision with ADL's, CG with shower transfers   PT- CS with transfers, ambulating more than 150 ft with RW-- CS, 12 steps NBQC with hand-held support and CG   Goals- 1. ambulate to bathroom with RW with supervision, 2. appropriate call bell use, 3. Improve problem solving and safety awareness   Dispo- 2/2 to Home

## 2023-02-02 NOTE — PROGRESS NOTE ADULT - ASSESSMENT
82y female with a history of HTN, HLD, glaucoma who presented to 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     #LE edema  - ULT on 12/29 negative for DVT  - c/w compression stockings  - Continue Lovenox DVT ppx    #Traumatic SDH  - s/p left [place] hole 12/29/22  - s/p brivaracetam  - continue to hold ASA per neurosurg  - continue donepezil and memantine  - rehab per primary team    #HLD  - continue atorvastatin    #Normocytic anemia  - Stable H/H  - Continue to monitor H/H    #Rhabdomyolysis   - Resolved    #HTN  - Hold amlodipine since BP appropriate    #Glaucoma  - continue latanoprost     #DVT ppx  - lovenox

## 2023-02-02 NOTE — PROGRESS NOTE ADULT - ATTENDING SUPERVISION STATEMENT
Fellow
Fellow
Resident
Fellow
Resident
Resident
Fellow
Resident
Fellow
Resident
Fellow
Resident

## 2023-02-02 NOTE — PROGRESS NOTE ADULT - SUBJECTIVE AND OBJECTIVE BOX
Patient is a 82y old  Female who presents with a chief complaint of Traumatic SDH (01 Feb 2023 13:24)      HPI:  82y female with a history of HTN, HLD, glaucoma who presented to [facility] on 12/28/22 after fall and found to have CTH with largely chronic L SDH (approx 2cm) w/ 8mm MLS.  Exam on admission was AOx2, PERRL, EOMI, no facial, slight R drift, LOWERY at least 4+/5.  On 12/29/22 patient underwent L francis hole for SDH evacuation. She was started on brivaracetam to complete a 7 day course (last dose to be 1/5 pm). Asprin continued to be held in the setting of SDH.  Hospital course notably for elevated CK and troponin  Cardiology consulted and work-up including EKG and TTE wnl.  CK downtrended throughout course and elevated CK and troponin likely secondary to acute rhabdomyolysis secondary to fall.  BP was monitored and amlodipine decreased to 2.5 mg (was 5 mg) for soft BP. Patient was evaluated by Physical Therapy and was recommended for Acute rehab to improve cognitive and physical function.   (04 Jan 2023 14:18)      SUBJECTIVE HISTORY:  Patient seen this morning at bedside.  Patient is without complaints.  Reports sleeping and eating well.    REVIEW OF SYSTEMS:  Denies abdominal pain, diarrhea, constipation, n/v        PHYSICAL EXAM  Constitutional - NAD, Comfortable  HEENT -left cranial surgical incision-healing well  Chest - breathing comfortably on RA  Cardio - warm and well perfused, RRR, no murmur  Abdomen -  Soft, NTND   Extr-- Left foot 2nd digit hammer toe with callus at distal tip  Neurologic Exam:                    Cognitive -             Orientation: Awake and Alert x person, place, year, date; able to state the correct month with cueing            Attention:  Impaired-- recall 1/3 objects with MC options; Able to repeat days of the week backwards, able to do simple calculations      Speech - + word finding difficulties; Fluent, Comprehensible, No dysarthria, No aphasia, repetition intact     Motor -                      LEFT    UE - ShAB 5/5, EF 5/5, EE 5/5, WE 5/5,  WNL                    RIGHT UE - ShAB 5/5, EF 5/5, EE 5/5, WE 5/5,  WNL                    LEFT    LE - HF 3/5, KE 4/5, DF 5/5, PF 5/5                    RIGHT LE - HF 5/5, KE 5/5, DF 5/5, PF 5/5     Psychiatric - Mood stable, Affect WNL    VITALS  82y  Vital Signs Last 24 Hrs  T(C): 36.8 (02 Feb 2023 07:15), Max: 37.2 (01 Feb 2023 19:26)  T(F): 98.3 (02 Feb 2023 07:15), Max: 98.9 (01 Feb 2023 19:26)  HR: 72 (02 Feb 2023 07:15) (71 - 72)  BP: 147/78 (02 Feb 2023 07:15) (112/68 - 147/78)  BP(mean): --  RR: 15 (02 Feb 2023 07:15) (14 - 15)  SpO2: 96% (02 Feb 2023 07:15) (96% - 98%)    Parameters below as of 02 Feb 2023 07:15  Patient On (Oxygen Delivery Method): room air      Daily     Daily         RECENT LABS:                          11.8   5.91  )-----------( 180      ( 02 Feb 2023 06:25 )             38.8     02-02    146<H>  |  107  |  18  ----------------------------<  96  4.6   |  31  |  0.69    Ca    9.0      02 Feb 2023 06:25    TPro  6.9  /  Alb  2.9<L>  /  TBili  0.3  /  DBili  x   /  AST  26  /  ALT  25  /  AlkPhos  119  02-02    LIVER FUNCTIONS - ( 02 Feb 2023 06:25 )  Alb: 2.9 g/dL / Pro: 6.9 g/dL / ALK PHOS: 119 U/L / ALT: 25 U/L / AST: 26 U/L / GGT: x                   CAPILLARY BLOOD GLUCOSE          MEDICATIONS  (STANDING):  atorvastatin 20 milliGRAM(s) Oral at bedtime  donepezil 10 milliGRAM(s) Oral daily  enoxaparin Injectable 40 milliGRAM(s) SubCutaneous <User Schedule>  latanoprost 0.005% Ophthalmic Solution 1 Drop(s) Both EYES at bedtime  memantine 5 milliGRAM(s) Oral two times a day  senna 2 Tablet(s) Oral at bedtime    MEDICATIONS  (PRN):  acetaminophen     Tablet .. 650 milliGRAM(s) Oral every 6 hours PRN Temp greater or equal to 38C (100.4F), Mild Pain (1 - 3)  polyethylene glycol 3350 17 Gram(s) Oral two times a day PRN Constipation

## 2023-03-03 ENCOUNTER — APPOINTMENT (OUTPATIENT)
Dept: PHYSICAL MEDICINE AND REHAB | Facility: CLINIC | Age: 83
End: 2023-03-03

## 2024-09-09 NOTE — PROGRESS NOTE ADULT - ASSESSMENT
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     # Traumatic SDH  -s/p left Francis hole 12/29/22  -s/p brivaracetam  -staples to be removed 1/8/23  -continue to hold ASA per neurosurg    #Normocytic anemia  - Will monitor Hg/Hct, transfuse if Hg <7  - Iron/B12/folate pending    #Rhabdomyolysis - improved  -elevated CK (now downtrending) and troponin  -EKG and TTE wnl    #Glaucoma  -c/w home eyedrops    #HTN  -c/w amlodipine 2.5mg daily    #HLD  -c/w atorvastatin    # DVT ppx:  lovenox   Handoff

## 2025-04-15 NOTE — ED ADULT NURSE NOTE - CHPI ED NUR SYMPTOMS POS
DIZZINESS Symptomatic with suspected or confirmed COVID-19. Defer administration of Influenza Vaccine at this time

## 2025-04-18 NOTE — PHYSICAL THERAPY INITIAL EVALUATION ADULT - STANDING BALANCE: DYNAMIC, REHAB EVAL
Patient scheduled for lab appointment on 04/22 ordered by . Orders are missing. Please enter or extend lab orders prior to the appointment noted above. If lab order is no longer needed, please remove the order from Epic    Thank you     fair balance

## (undated) DEVICE — Device

## (undated) DEVICE — BLADE SCALPEL SAFETYLOCK #15

## (undated) DEVICE — SOLIDIFIER ISOLYZER 2000 CC

## (undated) DEVICE — URETERAL CATH RED RUBBER 10FR (BLACK)

## (undated) DEVICE — DRAIN JACKSON PRATT 7FR ROUND END NO TROCAR

## (undated) DEVICE — ELCTR BIPOLAR PROBE

## (undated) DEVICE — MARKING PEN W RULER

## (undated) DEVICE — BLADE SCALPEL SAFETYLOCK #10

## (undated) DEVICE — DRAIN RESERVOIR FOR JACKSON PRATT 100CC CARDINAL

## (undated) DEVICE — VENODYNE/SCD SLEEVE CALF LARGE

## (undated) DEVICE — GLV 7 PROTEXIS (WHITE)

## (undated) DEVICE — ELCTR PEDICLE SCREW PROBE 3MM BALL 1.8MM X 100MM

## (undated) DEVICE — SUT VICRYL 3-0 18" X-1 (POP-OFF)

## (undated) DEVICE — DRAIN LIMITORR DRAIN SYSTEM 30ML

## (undated) DEVICE — SOL IRR POUR H2O 250ML

## (undated) DEVICE — WARMING BLANKET LOWER ADULT

## (undated) DEVICE — GOWN TRIMAX LG

## (undated) DEVICE — TUBING CODMAN INTEGRATED BIPOLAR CORD & TUBING SET FLYING LEADS

## (undated) DEVICE — LAP PAD 18 X 18"

## (undated) DEVICE — ELCTR 4-DISC 20MM 49" (RED, BLUE, GREEN, BLACK)

## (undated) DEVICE — PREP DURAPREP 26CC

## (undated) DEVICE — DRSG TELFA 3 X 8

## (undated) DEVICE — VISITEC 4X4

## (undated) DEVICE — BLADE SCALPEL SAFETYLOCK #11

## (undated) DEVICE — ELCTR BOVIE TIP BLADE INSULATED 2.75" EDGE

## (undated) DEVICE — PACK VP SHUNT

## (undated) DEVICE — DRAIN SILICONE DRAIN

## (undated) DEVICE — ELCTR MONOPOLAR STIMULATOR PROBE FLUSH-TIP

## (undated) DEVICE — MIDAS REX LEGEND TAPERED FOOTED SM BORE 1.5MM X 8CM

## (undated) DEVICE — SNAP ON SPHERZ 5 PACK

## (undated) DEVICE — DRAIN JACKSON PRATT 7MM FLAT FULL NO TROCAR

## (undated) DEVICE — TRAP SPECIMEN SPUTUM 40CC

## (undated) DEVICE — DRAPE 1/2 SHEET 40X57"

## (undated) DEVICE — TUBING SUCTION 20FT

## (undated) DEVICE — POSITIONER FOAM EGG CRATE ULNAR 2PCS (PINK)

## (undated) DEVICE — DRAPE CAMERA VIDEO 7"X96"

## (undated) DEVICE — STAPLER SKIN VISI-STAT 35 WIDE

## (undated) DEVICE — SOL IRR POUR NS 0.9% 500ML

## (undated) DEVICE — GLV 7.5 PROTEXIS (WHITE)

## (undated) DEVICE — DRAPE MINOR PROCEDURE

## (undated) DEVICE — PREP CHLORAPREP HI-LITE ORANGE 26ML

## (undated) DEVICE — SUCTION YANKAUER NO CONTROL VENT

## (undated) DEVICE — FOLEY TRAY 16FR 5CC LTX UMETER CLOSED

## (undated) DEVICE — MEDICATION LABELS W MARKER

## (undated) DEVICE — GLV 8.5 PROTEXIS (WHITE)

## (undated) DEVICE — URETERAL CATH RED RUBBER 14FR (GREEN)

## (undated) DEVICE — DRSG XEROFORM 1 X 8"

## (undated) DEVICE — MIDAS REX LEGEND LUBRICANT DIFFUSER CARTRIDGE

## (undated) DEVICE — SUT MONOSOF 3-0 18" C-14

## (undated) DEVICE — DRAIN JACKSON PRATT 10MM FLAT FULL NO TROCAR

## (undated) DEVICE — GLV 8 PROTEXIS (WHITE)

## (undated) DEVICE — GLV 6.5 PROTEXIS (WHITE)

## (undated) DEVICE — AESCULAP SCALPFIX 10 CLIPS

## (undated) DEVICE — ELCTR SUBDERMAL NDL CLASSIC 1.5M X 59" (6 COLOR)

## (undated) DEVICE — DRAPE NEUROLOGICAL

## (undated) DEVICE — DRAIN JACKSON PRATT 3 SPRING RESERVOIR W 10FR PVC DRAIN

## (undated) DEVICE — DRSG TAPE HYPAFIX 4"

## (undated) DEVICE — ELCTR SUBDERMAL CORKSCREW NDL 1.2MM

## (undated) DEVICE — DRAPE TOWEL BLUE 17" X 24"

## (undated) DEVICE — URETERAL CATH RED RUBBER 12FR (WHITE)

## (undated) DEVICE — SPECIMEN CONTAINER 100ML

## (undated) DEVICE — ELCTR SUBDERMAL NDL 27G X 1/2" WITH TWISTED PAIR

## (undated) DEVICE — CODMAN PERFORATOR 14MM (BLUE)

## (undated) DEVICE — DRAPE MAYO STAND 30"

## (undated) DEVICE — SYR LUER LOK 50CC

## (undated) DEVICE — DRAPE 3/4 SHEET W REINFORCEMENT 56X77"

## (undated) DEVICE — DRSG CURITY GAUZE SPONGE 4 X 4" 12-PLY